# Patient Record
Sex: MALE | Race: WHITE | Employment: FULL TIME | URBAN - METROPOLITAN AREA
[De-identification: names, ages, dates, MRNs, and addresses within clinical notes are randomized per-mention and may not be internally consistent; named-entity substitution may affect disease eponyms.]

---

## 2017-01-12 ENCOUNTER — HOSPITAL ENCOUNTER (OUTPATIENT)
Dept: LAB | Age: 57
Discharge: HOME | End: 2017-01-12

## 2017-01-12 DIAGNOSIS — Z11.59 NEED FOR HEPATITIS C SCREENING TEST: ICD-10-CM

## 2017-01-12 DIAGNOSIS — E66.9 OBESITY (BMI 30-39.9): ICD-10-CM

## 2017-01-12 DIAGNOSIS — E03.4 HYPOTHYROIDISM DUE TO ACQUIRED ATROPHY OF THYROID: ICD-10-CM

## 2017-01-12 DIAGNOSIS — E78.5 DYSLIPIDEMIA: ICD-10-CM

## 2017-01-12 DIAGNOSIS — I63.9 CEREBROVASCULAR ACCIDENT (CVA), UNSPECIFIED MECHANISM (HCC): ICD-10-CM

## 2017-01-12 DIAGNOSIS — E55.9 HYPOVITAMINOSIS D: ICD-10-CM

## 2017-01-12 DIAGNOSIS — Z00.00 ROUTINE GENERAL MEDICAL EXAMINATION AT A HEALTH CARE FACILITY: ICD-10-CM

## 2017-01-12 LAB — SENTARA SPECIMEN COL,SENBCF: NORMAL

## 2017-01-12 PROCEDURE — 99001 SPECIMEN HANDLING PT-LAB: CPT | Performed by: INTERNAL MEDICINE

## 2017-01-16 ENCOUNTER — OFFICE VISIT (OUTPATIENT)
Dept: INTERNAL MEDICINE CLINIC | Age: 57
End: 2017-01-16

## 2017-01-16 VITALS
DIASTOLIC BLOOD PRESSURE: 82 MMHG | TEMPERATURE: 99.4 F | WEIGHT: 245 LBS | HEIGHT: 71 IN | BODY MASS INDEX: 34.3 KG/M2 | SYSTOLIC BLOOD PRESSURE: 122 MMHG | HEART RATE: 85 BPM | OXYGEN SATURATION: 97 %

## 2017-01-16 DIAGNOSIS — E55.9 HYPOVITAMINOSIS D: ICD-10-CM

## 2017-01-16 DIAGNOSIS — E66.9 OBESITY (BMI 30-39.9): ICD-10-CM

## 2017-01-16 DIAGNOSIS — E78.5 DYSLIPIDEMIA: Primary | ICD-10-CM

## 2017-01-16 DIAGNOSIS — E03.4 HYPOTHYROIDISM DUE TO ACQUIRED ATROPHY OF THYROID: ICD-10-CM

## 2017-01-16 DIAGNOSIS — I63.9 CEREBROVASCULAR ACCIDENT (CVA), UNSPECIFIED MECHANISM (HCC): ICD-10-CM

## 2017-01-16 DIAGNOSIS — R73.03 PREDIABETES: ICD-10-CM

## 2017-01-16 RX ORDER — ERGOCALCIFEROL 1.25 MG/1
50000 CAPSULE ORAL 2 TIMES WEEKLY
Qty: 26 CAP | Refills: 3 | Status: SHIPPED | OUTPATIENT
Start: 2017-01-16 | End: 2018-01-21 | Stop reason: SDUPTHER

## 2017-01-16 RX ORDER — ROSUVASTATIN CALCIUM 20 MG/1
20 TABLET, COATED ORAL
Qty: 30 TAB | Refills: 11 | Status: SHIPPED | OUTPATIENT
Start: 2017-01-16 | End: 2017-04-07 | Stop reason: SDUPTHER

## 2017-01-16 RX ORDER — DIAZEPAM 5 MG/1
TABLET ORAL
Qty: 40 TAB | Refills: 0 | OUTPATIENT
Start: 2017-01-16 | End: 2017-02-27 | Stop reason: SDUPTHER

## 2017-01-16 NOTE — MR AVS SNAPSHOT
Visit Information Date & Time Provider Department Dept. Phone Encounter #  
 1/16/2017  9:30 AM Duong Khoury MD Internist of [address] [place] [address] Place [id number] Your Appointments 6/12/2017  9:45 AM  
LAB with [facility] NURSE VISIT Internist of [facility] ([address]) Appt Note: labs for rpe rd  
 [address] [facility], [address] [place] [address] [place]  
  
   
 [address], [address]  
  
    
 6/19/2017  1:30 PM  
PHYSICAL with Duong Khoury MD  
Internist of [facility] [address] [place] [address]) Appt Note: rpe per rd  
 [address], [address] [address] [address] [place]  
  
   
 [address], [address] Upcoming Health Maintenance Date Due COLONOSCOPY 2/3/2022 DTaP/Tdap/Td series (2 - Td) 2/4/2024 Allergies as of 1/16/2017  Review Complete On: 12/26/2016 By: Duong Khoury MD  
  
 Severity Noted Reaction Type Reactions Amoxicillin (Bulk)    Unknown (comments) Rash around collar area Codeine  01/04/2010    Other (comments) Cymbalta [Duloxetine]  09/10/2015    Other (comments) Gi upset Erythromycin    Diarrhea Lipitor [Atorvastatin]  09/24/2012    Myalgia Pcn [Penicillins]  01/04/2010    Rash Pravastatin  01/17/2013    Myalgia Septra [Sulfamethoprim Ds]  07/18/2011    Unknown (comments) Simvastatin  09/10/2015    Myalgia Current Immunizations  Reviewed on 1/16/2017 Name Date H1N1 FLU VACCINE 2/19/2010 Influenza Vaccine 10/1/2016, 10/1/2015, 10/1/2014, 9/1/2013 Influenza Vaccine Nasal 9/1/2011 Influenza Vaccine Whole 1/1/2009 TD Vaccine 10/1/2009 Tdap 2/4/2014 Zoster 10/26/2011 Reviewed by Duong Khoury MD on 1/16/2017 at  9:48 AM  
Vitals BP Pulse Temp Height(growth percentile) Weight(growth percentile) SpO2  
 122/82 85 99.4 °F (37.4 °C) (Oral) 5' 10.5\" (1.791 m) 245 lb (111.1 kg) 97% BMI Smoking Status 34.66 kg/m2 Never Smoker Vitals History BMI and BSA Data Body Mass Index Body Surface Area  
 34.66 kg/m 2 2.35 m 2 Preferred Pharmacy Pharmacy Name Phone [facility] [address], [address] Your Updated Medication List  
  
   
This list is accurate as of: 1/16/17  9:52 AM.  Always use your most recent med list.  
  
  
  
  
 aspirin delayed-release 81 mg tablet Take 81 mg by mouth daily. diazePAM 5 mg tablet Commonly known as:  VALIUM  
TAKE ONE TABLET BY MOUTH EVERY 6 HOURS  
  
 ergocalciferol 50,000 unit capsule Commonly known as:  ERGOCALCIFEROL  
TAKE ONE CAPSULE BY MOUTH ONCE A WEEK ( EVERY 7 DAYS )  
  
 fluticasone 50 mcg/actuation nasal spray Commonly known as:  FLONASE  
USE TWO SPRAYS IN EACH NOSTRIL ONCE DAILY  
  
 furosemide 40 mg tablet Commonly known as:  LASIX TAKE ONE TABLET BY MOUTH ONCE DAILY  
  
 KLOR-CON M10 10 mEq tablet Generic drug:  potassium chloride TAKE ONE TABLET BY MOUTH TWICE DAILY  
  
 levothyroxine 100 mcg tablet Commonly known as:  SYNTHROID Take 1 Tab by mouth Daily (before breakfast). lovastatin 10 mg tablet Commonly known as:  MEVACOR  
TAKE ONE TABLET BY MOUTH ONCE NIGHTLY PriLOSEC 20 mg capsule Generic drug:  omeprazole Take 20 mg by mouth daily. Introducing [facility]! Dear Ashley Chatterjee: 
Thank you for requesting a [employer] account. Our records indicate that you already have an active Liquipel account. You can access your account anytime at [email or web address] Me Did you know that you can access your hospital and ER discharge instructions at any time in Liquipel? You can also review all of your test results from your hospital stay or ER visit. Additional Information If you have questions, please visit the Frequently Asked Questions section of the [employer] website at [email or web address] Remember, [employer] is NOT to be used for urgent needs. For medical emergencies, dial 911. Now available from your iPhone and Android! Please provide this summary of care documentation to your next provider. Your primary care clinician is listed as Missy Diamond. If you have any questions after today's visit, please call [phone].

## 2017-01-16 NOTE — PROGRESS NOTES
60 y.o. WHITE OR  male who presents for f/u    He's trying w the diet and exercise, weight as below    Vitals 1/16/2017 5/26/2016 12/17/2015 9/10/2015 5/19/2015 8/4/2014   Weight 245 lb 244 lb 247 lb 248 lb 245 lb 231 lb     Vitals 8/4/2014 2/4/2014   Weight  230 lb     He came off HRT 2015    The sleep apnea is controlled and he wears the mask religiously    He is doing ok on mevacor w no aches but not at target as below    No gi or gu issues.       He developed right sided hearing loss which is better after abx and steroid, currently being managed by Dr Kat Ross    Past Medical History   Diagnosis Date    Anxiety      LOPEZ-7 was 11/21 from 7/11    Arthritis 3/13     djd on mri c spine Dr. Leah Suarez Colon polyps 2008     Dr. Mark Christian CVA (cerebral vascular accident) [facility]) 2006     Dr. Guerita Castro s/p PFO closure, carotids negative    Depression      PHQ-9 was 7/27 from (7/11); 10/27 from (5/15)    Diverticula of colon     Dyslipidemia      intol lipitor, prava, zocor; tolerated mevacor    FHx: heart disease     GERD (gastroesophageal reflux disease)     Hemorrhoid     Hypogonadism male 2011     Dr Kayleigh Vela; dc'ed replacement tx     Hypothyroidism     Nephrolithiasis     Obesity (BMI 30-39.9)      peak weight 245 lbs, bmi 34.7 from 5/15; dec julia[ supp, med supervised wt loss, bariatric options    Peripheral neuropathy ([address] 2/13     on EMG negative serologies    PFO (patent foramen ovale) s/p closure Dr. Lindsay Valenzuela 2006     Prediabetes     Seasonal allergies     Sleep apnea 2006     Dr. Edgar Rodriguez White coat hypertension      Past Surgical History   Procedure Laterality Date    Hx orthopaedic       knee surgery    Hx hernia repair      Hx tonsillectomy      Pr cardiac surg procedure unlist  5/16     ETT negative    Hx colonoscopy       Dr. Vijay Rush 2008 polyp; Dr Lorenzo Montenegro 2/3/12 neg     Social History     Social History    Marital status:      Spouse name: N/A    Number of children: 2  Years of education: N/A     Occupational History    auto parts worker      Social History Main Topics    Smoking status: Never Smoker    Smokeless tobacco: Never Used    Alcohol use No    Drug use: No    Sexual activity: Yes     Partners: Female     Other Topics Concern    Not on file     Social History Narrative     Allergies   Allergen Reactions    Amoxicillin (Bulk) Unknown (comments)     Rash around collar area    Codeine Other (comments)    Cymbalta [Duloxetine] Other (comments)     Gi upset    Erythromycin Diarrhea    Lipitor [Atorvastatin] Myalgia    Pcn [Penicillins] Rash    Pravastatin Myalgia    Septra [Sulfamethoprim Ds] Unknown (comments)    Simvastatin Myalgia     Current Outpatient Prescriptions on File Prior to Visit   Medication Sig Dispense Refill    diazePAM (VALIUM) 5 mg tablet TAKE ONE TABLET BY MOUTH EVERY 6 HOURS 40 Tab 0    furosemide (LASIX) 40 mg tablet TAKE ONE TABLET BY MOUTH ONCE DAILY 90 Tab 3    levothyroxine (SYNTHROID) 100 mcg tablet Take 1 Tab by mouth Daily (before breakfast). 90 Tab 3    fluticasone (FLONASE) 50 mcg/actuation nasal spray USE TWO SPRAYS IN EACH NOSTRIL ONCE DAILY 1 Bottle 3    KLOR-CON M10 10 mEq tablet TAKE ONE TABLET BY MOUTH TWICE DAILY 60 Tab 012    omeprazole (PRILOSEC) 20 mg capsule Take 20 mg by mouth daily.  aspirin delayed-release 81 mg tablet Take 81 mg by mouth daily. No current facility-administered medications on file prior to visit.       REVIEW OF SYSTEMS: colo 2/12 Dr Sarah Tinsley  Ophtho - no vision change or eye pain  Oral - no mouth pain, tongue or tooth problems  Ears - no hearing loss, ear pain, fullness, no swallowing problems  Cardiac - no CP, PND, orthopnea, edema, palpitations or syncope  Chest - no breast masses  Resp - no wheezing, chronic coughing, dyspnea  GI - no heartburn, nausea, vomiting, change in bowel habits, bleeding, hemorrhoids  Urinary - no dysuria, hematuria, flank pain, urgency, frequency  Neuro - no focal weakness, numbness, paresthesias, incoordination, ataxia, involuntary movements  Endo - no polyuria, polydipsia, nocturia, hot flashes    Visit Vitals    /82    Pulse 85    Temp 99.4 °F (37.4 °C) (Oral)    Ht 5' 10.5\" (1.791 m)    Wt 245 lb (111.1 kg)    SpO2 97%    BMI 34.66 kg/m2   A&O x3  Affect is appropriate. Mood stable  No apparent distress  Anicteric, no JVD, adenopathy or thyromegaly. No carotid bruits or radiated murmur  Lungs clear to auscultation, no wheezes or rales  Heart showed regular rate and rhythm. No murmur, rubs, gallops  Abdomen soft nontender, no hepatosplenomegaly or masses. Extremities without edema.   Pulses 1-2+ symmetrically    LABS  From 2/10 showed   gluc 93,   cr 0.80,      alt 33,               chol 188, tg 74, hdl 45, ldl-c 128,             wbc 4.3, hb 14.3, plt 197,             psa 0.70, ua neg  From 10/11 showed gluc 102, cr 0.89, gfr 101, alt 25,           ldl-p 1499,             tsh 5.03, wbc 3.7, hb 13.4, plt 231  From 12/11 showed                     tsh 2.24,          ft4 1.02, tpo ab+  From 4/12 showed                     tsh 4.18  From 9/12 showed   gluc 106, cr 0.70, gfr>60,  alt 34,           ldl-p 1839, chol 191, tg 76, hdl 42, ldl-c 134  From 1/13 showed   gluc 100,       hba1c 6.0,      chol 174, tg 52, hdl 35, ldl-c 127,                        vit d 22.3  From 4/13 showed         hba1c 5.8,                         b12 499, fol 12.5, spep neg, guera neg  From 1/14 showed   gluc 97,   cr 0.90, gfr>60,  alt 26, hba1c 5.8,              chol 190, tg 85, hdl 44, ldl-c 129, tsh 3.72, wbc 4.9, hb 13.4, plt 262, vit d 29.2  From 5/15 showed   gluc 100, cr 0.91, gfr 94,   alt 24,       chol 177, tg 79, hdl 44, ldl-c 117, tsh 4.35, wbc 5.5, hb 13.1, plt 240  From 5/15 showed                     tsh 4.34,           b12 393, fol 11.3  From 9/15 showed            chol 183, tg 96, hdl 47, ldl-c 117, tsh 3.00,          vit d 29.0  From 5/16 showed   gluc 99,   cr 0.90, gfr>60, alt 21, hba1c 6.0,               tsh 4.73, wbc 4.4, hb 13.3, plt 222  From 1/17 showed   gluc 104, cr 0.70, gfr>60, alt 25,       chol 157, tg 71, hdl 46, ldl-c 97,             wbc 3.6, hb 13.2, plt 240, vit d 24.2, tsh 2.41, psa 1.24    Patient Active Problem List   Diagnosis Code    CVA Dr. Melia Vázquez 2006 I63.9    Sleep Apnea Dr. Dayton Mortimer 2006 G47.30    Anxiety and depression 2011 F41.9    Colon polyps and diverticulosis Dr. Stephanie Nevarez 2008 K63.5    White coat hypertension R03.0    Hypogonadism Dr. Arnol Tran 2011 E29.1    Dyslipidemia E78.5    Hypovitaminosis D E55.9    Neuropathy G62.9    Hypothyroidism due to acquired atrophy of thyroid E03.4    Gastroesophageal reflux disease without esophagitis K21.9    Obesity (BMI 30-39. 9) E66.9    Prediabetes R73.03     Assessment and plan:  1. HLP. Trial mevacor, crestor went generic recently so may change to that in future  2. Hypovit d. Inc to 2x/week dosing  3. Hypothyroidism. Therapeutic dosing  4. Hypogonadism. F/U Dr. Arnol Tran as needed, he is off replacement. 5. GERD. PPI and avoidance measures  6. Prediabetes. Lifestyle and dietary measures, wt loss would be ideal  7. Neuropathy. Declined meds for now  8. Polyps and diverticulosis. Fiber, f/u Dr Stephanie Nevarez 2018  9. Obesity. Declined julia supp, med supervised wt loss, bariatrics        RTC 6/17      Above conditions discussed at length and patient vocalized understanding.   All questions answered to patient satifaction

## 2017-01-16 NOTE — TELEPHONE ENCOUNTER
Last date seen:1/16/17  Last date filled:12/2/16     report was pulled on 60 y.o. [facility], No discrepancies were found.

## 2017-01-16 NOTE — PROGRESS NOTES
1. Have you been to the ER, urgent care clinic or hospitalized since your last visit? NO.     2. Have you seen or consulted any other health care providers outside of the [facility] since your last visit (Include any pap smears or colon screening)? NO      Do you have an Advanced Directive? NO    Would you like information on Advanced Directives?  YES

## 2017-02-27 RX ORDER — DIAZEPAM 5 MG/1
TABLET ORAL
Qty: 40 TAB | Refills: 0 | Status: SHIPPED | OUTPATIENT
Start: 2017-02-27 | End: 2017-04-07 | Stop reason: SDUPTHER

## 2017-02-28 ENCOUNTER — DOCUMENTATION ONLY (OUTPATIENT)
Dept: INTERNAL MEDICINE CLINIC | Age: 57
End: 2017-02-28

## 2017-02-28 RX ORDER — DIAZEPAM 5 MG/1
TABLET ORAL
Qty: 40 TAB | Refills: 0 | OUTPATIENT
Start: 2017-02-28

## 2017-04-04 RX ORDER — POTASSIUM CHLORIDE 750 MG/1
TABLET, FILM COATED, EXTENDED RELEASE ORAL
Qty: 60 TAB | Refills: 0 | Status: SHIPPED | OUTPATIENT
Start: 2017-04-04 | End: 2017-08-11 | Stop reason: SDUPTHER

## 2017-04-07 DIAGNOSIS — E78.5 DYSLIPIDEMIA: ICD-10-CM

## 2017-04-07 RX ORDER — ROSUVASTATIN CALCIUM 20 MG/1
20 TABLET, COATED ORAL
Qty: 30 TAB | Refills: 11 | Status: SHIPPED | OUTPATIENT
Start: 2017-04-07 | End: 2018-02-19 | Stop reason: SDUPTHER

## 2017-04-07 RX ORDER — DIAZEPAM 5 MG/1
TABLET ORAL
Qty: 40 TAB | Refills: 0 | Status: SHIPPED | OUTPATIENT
Start: 2017-04-07 | End: 2017-05-23 | Stop reason: SDUPTHER

## 2017-04-11 RX ORDER — ROSUVASTATIN CALCIUM 20 MG/1
20 TABLET, FILM COATED ORAL
Qty: 90 TAB | Refills: 0 | Status: SHIPPED | OUTPATIENT
Start: 2017-04-11 | End: 2017-07-05 | Stop reason: SDUPTHER

## 2017-05-10 ENCOUNTER — OFFICE VISIT (OUTPATIENT)
Dept: ORTHOPEDIC SURGERY | Age: 57
End: 2017-05-10

## 2017-05-10 VITALS
TEMPERATURE: 99 F | HEART RATE: 70 BPM | WEIGHT: 240 LBS | DIASTOLIC BLOOD PRESSURE: 73 MMHG | BODY MASS INDEX: 33.95 KG/M2 | SYSTOLIC BLOOD PRESSURE: 132 MMHG

## 2017-05-10 DIAGNOSIS — M17.11 PRIMARY OSTEOARTHRITIS OF RIGHT KNEE: ICD-10-CM

## 2017-05-10 DIAGNOSIS — M17.12 PRIMARY OSTEOARTHRITIS OF LEFT KNEE: Primary | ICD-10-CM

## 2017-05-10 RX ORDER — TRIAMCINOLONE ACETONIDE 40 MG/ML
40 INJECTION, SUSPENSION INTRA-ARTICULAR; INTRAMUSCULAR ONCE
Qty: 1 ML | Refills: 0
Start: 2017-05-10 | End: 2017-05-10

## 2017-05-10 RX ORDER — BISMUTH SUBSALICYLATE 262 MG
1 TABLET,CHEWABLE ORAL DAILY
COMMUNITY
End: 2018-03-29

## 2017-05-10 NOTE — PROGRESS NOTES
Karlene Ortiz  1960   Chief Complaint   Patient presents with    Knee Pain     Omar        HISTORY OF PRESENT ILLNESS  Karlene Ortiz is a 60 y.o. male who presents today for evaluation of B/L knee pain L>R. He rates his pain 5/10 today. He recalls about two months ago when he was playing with his grandson. He states a stick got stuck in between his legs when he was mid run and caused him to fall to the ground. He notes a throbbing sensation. He does note some swelling. He states he ambulates with a limp. He localizes the pain to the medial aspect of the knees.     Allergies   Allergen Reactions    Amoxicillin (Bulk) Unknown (comments)     Rash around collar area    Codeine Other (comments)    Cymbalta [Duloxetine] Other (comments)     Gi upset    Erythromycin Diarrhea    Lipitor [Atorvastatin] Myalgia    Pcn [Penicillins] Rash    Pravastatin Myalgia    Septra [Sulfamethoprim Ds] Unknown (comments)    Simvastatin Myalgia        Past Medical History:   Diagnosis Date    Anxiety     LOPEZ-7 was 11/21 from 7/11    Arthritis 3/13    djd on mri c spine Dr. Edith De Anda Colon polyps 2008    Dr. Nicole Zelaya [facility]    Dr. Baldwin Slight s/p PFO closure, carotids negative    Depression     PHQ-9 was 7/27 from (7/11); 10/27 from (5/15)    Diverticula of colon     Dyslipidemia     intol lipitor, prava, zocor; tolerated mevacor    FHx: heart disease     GERD (gastroesophageal reflux disease)     Hemorrhoid     Hypogonadism male 2011    Dr Chandu Tapia; dc'ed replacement tx     Hypothyroidism     Nephrolithiasis     Obesity (BMI 30-39.9)     peak weight 245 lbs, bmi 34.7 from 5/15; dec julia[ supp, med supervised wt loss, bariatric options    Peripheral neuropathy ([facility] [address] 2/13    on EMG negative serologies    PFO (patent foramen ovale) s/p closure Dr. Corey Quintero 2006     Prediabetes     Seasonal allergies     Sleep apnea 2006    Dr. Minor Whitley White coat hypertension       Social History     Social History    Marital status:      Spouse name: N/A    Number of children: 2    Years of education: N/A     Occupational History    auto parts worker      Social History Main Topics    Smoking status: Never Smoker    Smokeless tobacco: Never Used    Alcohol use No    Drug use: No    Sexual activity: Yes     Partners: Female     Other Topics Concern    Not on file     Social History Narrative      Past Surgical History:   Procedure Laterality Date    CARDIAC SURG PROCEDURE UNLIST  5/16    ETT negative    HX COLONOSCOPY      Dr. Zelaya Code 2008 polyp; Dr Billy Brown 2/3/12 neg    HX HERNIA REPAIR      HX ORTHOPAEDIC      knee surgery    HX TONSILLECTOMY        Family History   Problem Relation Age of Onset    Hypertension Mother     Cancer Mother      renal and kisney cancer   Donia [employer] Arthritis-rheumatoid Mother     Hypertension Father     Heart Disease Father       Current Outpatient Prescriptions   Medication Sig    multivitamin (ONE A DAY) tablet Take 1 Tab by mouth daily.  triamcinolone acetonide (KENALOG) 40 mg/mL injection 1 mL by IntraMUSCular route once for 1 dose.  CRESTOR 20 mg tablet Take 1 Tab by mouth nightly.  ergocalciferol (ERGOCALCIFEROL) 50,000 unit capsule Take 1 Cap by mouth two (2) times a week.  furosemide (LASIX) 40 mg tablet TAKE ONE TABLET BY MOUTH ONCE DAILY    levothyroxine (SYNTHROID) 100 mcg tablet Take 1 Tab by mouth Daily (before breakfast).  fluticasone (FLONASE) 50 mcg/actuation nasal spray USE TWO SPRAYS IN EACH NOSTRIL ONCE DAILY    KLOR-CON M10 10 mEq tablet TAKE ONE TABLET BY MOUTH TWICE DAILY    omeprazole (PRILOSEC) 20 mg capsule Take 20 mg by mouth daily.  aspirin delayed-release 81 mg tablet Take 81 mg by mouth daily.  diazePAM (VALIUM) 5 mg tablet TAKE ONE TABLET BY MOUTH EVERY 6 HOURS AS NEEDED    rosuvastatin (CRESTOR) 20 mg tablet Take 1 Tab by mouth nightly.     potassium chloride SR (KLOR-CON 10) 10 mEq tablet TAKE ONE TABLET BY MOUTH TWICE DAILY     No current facility-administered medications for this visit. REVIEW OF SYSTEM   Patient denies: Weight loss, Fever/Chills, HA, Visual changes, Fatigue, Chest pain, SOB, Abdominal pain, N/V/D/C, Blood in stool or urine, Edema. Pertinent positive as above in HPI. All others were negative    PHYSICAL EXAM:   Visit Vitals    /73 (BP 1 Location: Left arm, BP Patient Position: Sitting)    Pulse 70    Temp 99 °F (37.2 °C) (Oral)    Wt 240 lb (108.9 kg)    BMI 33.95 kg/m2     The patient is a well-developed, well-nourished male   in no acute distress. The patient is alert and oriented times three. The patient is alert and oriented times three. Mood and affect are normal.  LYMPHATIC: lymph nodes are not enlarged and are within normal limits  SKIN: normal in color and non tender to palpation. There are no bruises or abrasions noted. NEUROLOGICAL: Motor sensory exam is within normal limits. Reflexes are equal bilaterally. There is normal sensation to pinprick and light touch  MUSCULOSKELETAL:  Examination Left knee Right knee   Skin Intact Intact   Range of motion 0-130 0-130   Effusion - -   Medial joint line tenderness + +   Lateral joint line tenderness - -   Tenderness Pes Bursa - -   Tenderness insertion MCL - -   Tenderness insertion LCL - -   Julios - -   Patella crepitus - -   Patella grind - -   Lachman - -   Pivot shift - -   Anterior drawer - -   Posterior drawer - -   Varus stress - -   Valgus stress - -   Neurovascular Intact Intact   Calf Swelling and Tenderness to Palpation - -   Jaydon's Test - -   Hamstring Cord Tightness - -          PROCEDURE: After sterile prep, 4 cc of Xylocaine and 1 cc of Kenalog were injected into the left knee.        [facility]  OFFICE PROCEDURE PROGRESS NOTE        Chart reviewed for the following:  Ridge Diaz MD, have reviewed the History, Physical and updated the Allergic reactions for [employer]     TIME OUT performed immediately prior to start of procedure:  I, Murray Iyer MD, have performed the following reviews on Linkfluence prior to the start of the procedure:            * Patient was identified by name and date of birth   * Agreement on procedure being performed was verified  * Risks and Benefits explained to the patient  * Procedure site verified and marked as necessary  * Patient was positioned for comfort  * Consent was signed and verified     Time: 4:09 PM    Date of procedure: 5/10/2017    Procedure performed by:  Murray Iyer MD    Provider assisted by: (see medication administration)    How tolerated by patient: tolerated the procedure well with no complications    Comments: none      IMPRESSION:      ICD-10-CM ICD-9-CM    1. Primary osteoarthritis of left knee M17.12 715.16 TRIAMCINOLONE ACETONIDE INJ      triamcinolone acetonide (KENALOG) 40 mg/mL injection      DRAIN/INJECT LARGE JOINT/BURSA   2. Primary osteoarthritis of right knee M17.11 715.16         PLAN: Given the arthritic changes documented on the XR, I feel he would benefit most from a cortisone injection. Since he is currently experiencing more pain in the left knee, we will proceed with a cortisone injection in the left knee today. If he does not see lasting relief from the injection we may have to proceed with an MRI. I will see him back in 2 weeks for reevaluation. Follow-up Disposition: Not on File    Scribed by [facility]) as dictated by Murray Iyer MD    I, Dr. Murray Iyer, confirm that all documentation is accurate.     Murray Iyer M.D.   [facility]

## 2017-05-23 RX ORDER — DIAZEPAM 5 MG/1
TABLET ORAL
Qty: 40 TAB | Refills: 0 | OUTPATIENT
Start: 2017-05-23 | End: 2017-05-24 | Stop reason: SDUPTHER

## 2017-05-31 ENCOUNTER — OFFICE VISIT (OUTPATIENT)
Dept: ORTHOPEDIC SURGERY | Age: 57
End: 2017-05-31

## 2017-05-31 VITALS
HEIGHT: 71 IN | TEMPERATURE: 98 F | HEART RATE: 68 BPM | BODY MASS INDEX: 33.88 KG/M2 | DIASTOLIC BLOOD PRESSURE: 85 MMHG | WEIGHT: 242 LBS | SYSTOLIC BLOOD PRESSURE: 137 MMHG

## 2017-05-31 DIAGNOSIS — M17.11 PRIMARY OSTEOARTHRITIS OF RIGHT KNEE: ICD-10-CM

## 2017-05-31 DIAGNOSIS — M17.12 PRIMARY OSTEOARTHRITIS OF LEFT KNEE: Primary | ICD-10-CM

## 2017-05-31 NOTE — PROGRESS NOTES
Tae Alvarado  1960   Chief Complaint   Patient presents with    Knee Pain     Omar        HISTORY OF PRESENT ILLNESS  Tae Alvarado is a 60 y.o. male who presents today for reevaluation of B/L knee pain L>R. He rates his pain 2/10 today. At last office visit, patient's left knee was injected with cortisone. He reports still having pain in the left knee but feels as though the right knee pain has improved on its own. He recalls about two months ago when he was playing with his grandson. He states a stick got stuck in between his legs when he was mid run and caused him to fall to the ground. He notes a throbbing sensation. He does note some swelling. He states he ambulates with a limp. He localizes the pain to the medial aspect of the knees. Patient denies any fever, chills, chest pain, shortness of breath or calf pain. There are no new illness or injuries to report since last seen in the office. There are no changes to medications, allergies, family or social history. PHYSICAL EXAM:   Visit Vitals    /85    Pulse 68    Temp 98 °F (36.7 °C) (Oral)    Ht 5' 10.5\" (1.791 m)    Wt 242 lb (109.8 kg)    BMI 34.23 kg/m2     The patient is a well-developed, well-nourished male   in no acute distress. The patient is alert and oriented times three. The patient is alert and oriented times three. Mood and affect are normal.  LYMPHATIC: lymph nodes are not enlarged and are within normal limits  SKIN: normal in color and non tender to palpation. There are no bruises or abrasions noted. NEUROLOGICAL: Motor sensory exam is within normal limits. Reflexes are equal bilaterally.  There is normal sensation to pinprick and light touch  MUSCULOSKELETAL:  Examination Left knee Right knee   Skin Intact Intact   Range of motion 0-130 0-130   Effusion - -   Medial joint line tenderness + +   Lateral joint line tenderness - -   Tenderness Pes Bursa - -   Tenderness insertion MCL - -   Tenderness insertion LCL - - Julios - -   Patella crepitus - -   Patella grind - -   Lachman - -   Pivot shift - -   Anterior drawer - -   Posterior drawer - -   Varus stress - -   Valgus stress - -   Neurovascular Intact Intact   Calf Swelling and Tenderness to Palpation - -   Jaydon's Test - -   Hamstring Cord Tightness - -       IMPRESSION:      ICD-10-CM ICD-9-CM    1. Primary osteoarthritis of left knee M17.12 715.16    2. Primary osteoarthritis of right knee M17.11 715.16         PLAN: Patient received limited relief from the cortisone injection he received in his left knee at his last office visit. At this time, he does not feel the pain is bad enough to proceed with an MRI. He will continue his activities as tolerated. I will see him back as needed. Follow-up Disposition: Not on File    Scribed by Parker Sotelo [facility]) as dictated by Christine Burnette MD    I, Dr. Christine Burnette, confirm that all documentation is accurate.     Christine Burnette M.D.   [facility]

## 2017-06-12 LAB
AVG GLU, 10930: 126 MG/DL (ref 91–123)
HBA1C MFR BLD HPLC: 6 % (ref 4.8–5.9)

## 2017-06-13 LAB
25(OH)D3 SERPL-MCNC: 37 NG/ML (ref 32–100)
ALT SERPL-CCNC: 20 U/L (ref 5–40)
ANION GAP SERPL CALC-SCNC: 14 MMOL/L
BUN SERPL-MCNC: 16 MG/DL (ref 6–22)
CALCIUM SERPL-MCNC: 8.8 MG/DL (ref 8.4–10.4)
CHLORIDE SERPL-SCNC: 103 MMOL/L (ref 98–110)
CHOLEST SERPL-MCNC: 105 MG/DL (ref 110–200)
CO2 SERPL-SCNC: 26 MMOL/L (ref 20–32)
CREAT SERPL-MCNC: 0.8 MG/DL (ref 0.5–1.2)
GFRAA, 66117: >60
GFRNA, 66118: >60
GLUCOSE SERPL-MCNC: 104 MG/DL (ref 65–99)
HDLC SERPL-MCNC: 59 MG/DL (ref 40–59)
LDLC SERPL CALC-MCNC: 40 MG/DL (ref 50–99)
POTASSIUM SERPL-SCNC: 4.3 MMOL/L (ref 3.5–5.5)
SODIUM SERPL-SCNC: 143 MMOL/L (ref 133–145)
TRIGL SERPL-MCNC: 34 MG/DL (ref 40–149)
VLDLC SERPL CALC-MCNC: 7 MG/DL (ref 8–30)

## 2017-06-15 NOTE — PROGRESS NOTES
60 y.o. WHITE OR  male who presents for RPE    He's trying w the diet, exercise has been off due to his knees    Vitals 6/19/2017 5/31/2017 5/10/2017 1/16/2017 5/26/2016   Weight 248 lb 12.8 oz 242 lb 240 lb 245 lb 244 lb     The sleep apnea is controlled and he wears the mask religiously    He changed over to crestor and reports no problems    No gi or gu issues.       Dr Gigi Vasquez gave him cortisone shot and his knees are better    Past Medical History:   Diagnosis Date    Anxiety     LOPEZ-7 was 11/21 from 7/11    Arthritis 3/13    djd on mri c spine Dr. Reza Loera Colon polyps 2008    Dr. Destini Norwood CVA (cerebral vascular accident) [facility]) 2006    Dr. Holly Tapia s/p PFO closure, carotids negative    Depression     PHQ-9 was 7/27 from (7/11); 10/27 from (5/15)    Diverticula of colon     Dyslipidemia     intol lipitor, prava, zocor; tolerated mevacor    FHx: heart disease     GERD (gastroesophageal reflux disease)     Hearing loss 2016    right sided; s/p myringotomy Dr Elder Ramsey; now seeing diff ENT    Hemorrhoid     Hypogonadism male 2011    Dr Chris Howard; dc'ed replacement tx 2015    Hypothyroidism     Nephrolithiasis     Obesity (BMI 30-39.9)     peak weight 245 lbs, bmi 34.7 from 5/15; dec julia[ supp, med supervised wt loss, bariatric options    Peripheral neuropathy ([address] 2/13    on EMG negative serologies    PFO (patent foramen ovale) s/p closure Dr. Hazel Cortez 2006     Prediabetes     Seasonal allergies     Sleep apnea 2006    Dr. Danyel Araujo White coat hypertension      Past Surgical History:   Procedure Laterality Date    CARDIAC SURG PROCEDURE UNLIST  5/16    ETT negative    HX COLONOSCOPY      Dr. Pawel Sigala 2008 polyp; Dr Vickie Matt 2/3/12 neg    HX HERNIA REPAIR      HX ORTHOPAEDIC      knee surgery    HX TONSILLECTOMY       Social History     Social History    Marital status:      Spouse name: N/A    Number of children: 2    Years of education: N/A     Occupational History    auto parts worker      Social History Main Topics    Smoking status: Never Smoker    Smokeless tobacco: Never Used    Alcohol use No    Drug use: No    Sexual activity: Yes     Partners: Female     Other Topics Concern    Not on file     Social History Narrative     Allergies   Allergen Reactions    Amoxicillin (Bulk) Unknown (comments)     Rash around collar area    Codeine Other (comments)    Cymbalta [Duloxetine] Other (comments)     Gi upset    Erythromycin Diarrhea    Lipitor [Atorvastatin] Myalgia    Pcn [Penicillins] Rash    Pravastatin Myalgia    Septra [Sulfamethoprim Ds] Unknown (comments)    Simvastatin Myalgia     Current Outpatient Prescriptions on File Prior to Visit   Medication Sig Dispense Refill    diazePAM (VALIUM) 5 mg tablet TAKE ONE TABLET BY MOUTH EVERY 6 HOURS AS NEEDED 40 Tab 0    multivitamin (ONE A DAY) tablet Take 1 Tab by mouth daily.  CRESTOR 20 mg tablet Take 1 Tab by mouth nightly. 90 Tab 0    rosuvastatin (CRESTOR) 20 mg tablet Take 1 Tab by mouth nightly. 30 Tab 11    potassium chloride SR (KLOR-CON 10) 10 mEq tablet TAKE ONE TABLET BY MOUTH TWICE DAILY 60 Tab 0    ergocalciferol (ERGOCALCIFEROL) 50,000 unit capsule Take 1 Cap by mouth two (2) times a week. 26 Cap 3    furosemide (LASIX) 40 mg tablet TAKE ONE TABLET BY MOUTH ONCE DAILY 90 Tab 3    levothyroxine (SYNTHROID) 100 mcg tablet Take 1 Tab by mouth Daily (before breakfast). 90 Tab 3    fluticasone (FLONASE) 50 mcg/actuation nasal spray USE TWO SPRAYS IN EACH NOSTRIL ONCE DAILY 1 Bottle 3    KLOR-CON M10 10 mEq tablet TAKE ONE TABLET BY MOUTH TWICE DAILY 60 Tab 012    omeprazole (PRILOSEC) 20 mg capsule Take 20 mg by mouth daily.  aspirin delayed-release 81 mg tablet Take 81 mg by mouth daily. No current facility-administered medications on file prior to visit.       REVIEW OF SYSTEMS: colo 2/12 Dr Paulino Estrada  no vision change or eye pain  Oral  no mouth pain, tongue or tooth problems  Ears  no hearing loss, ear pain, fullness, no swallowing problems  Cardiac  no CP, PND, orthopnea, edema, palpitations or syncope  Chest  no breast masses  Resp  no wheezing, chronic coughing, dyspnea  GI  no heartburn, nausea, vomiting, change in bowel habits, bleeding, hemorrhoids  Urinary  no dysuria, hematuria, flank pain, urgency, frequency  Neuro  no focal weakness, numbness, paresthesias, incoordination, ataxia, involuntary movements  Endo - no polyuria, polydipsia, nocturia, hot flashes    Visit Vitals    /70 (BP 1 Location: Right arm, BP Patient Position: Sitting)    Pulse 74    Temp 98.5 °F (36.9 °C) (Oral)    Resp 14    Ht 5' 10\" (1.778 m)    Wt 248 lb 12.8 oz (112.9 kg)    SpO2 97%    BMI 35.7 kg/m2   A&O x3  Affect is appropriate. Mood stable  No apparent distress  Anicteric, no JVD, adenopathy or thyromegaly. No carotid bruits or radiated murmur  Lungs clear to auscultation, no wheezes or rales  Heart showed regular rate and rhythm. No  rubs, gallops. 2/6 jodie lsb without rad  Abdomen soft nontender, no hepatosplenomegaly or masses. Extremities without edema.   Pulses 1-2+ symmetrically    LABS  From 2/10 showed   gluc 93,   cr 0.80,      alt 33,               chol 188, tg 74, hdl 45, ldl-c 128,             wbc 4.3, hb 14.3, plt 197,             psa 0.70, ua neg  From 10/11 showed gluc 102, cr 0.89, gfr 101, alt 25,           ldl-p 1499,             tsh 5.03, wbc 3.7, hb 13.4, plt 231  From 12/11 showed                     tsh 2.24,          ft4 1.02, tpo ab+  From 4/12 showed                     tsh 4.18  From 9/12 showed   gluc 106, cr 0.70, gfr>60,  alt 34,           ldl-p 1839, chol 191, tg 76, hdl 42, ldl-c 134  From 1/13 showed   gluc 100,       hba1c 6.0,      chol 174, tg 52, hdl 35, ldl-c 127,                        vit d 22.3  From 4/13 showed         hba1c 5.8,                         b12 499, fol 12.5, spep neg, guera neg  From 1/14 showed   gluc 97,   cr 0.90, gfr>60,  alt 26, hba1c 5.8,              chol 190, tg 85, hdl 44, ldl-c 129, tsh 3.72, wbc 4.9, hb 13.4, plt 262, vit d 29.2  From 5/15 showed   gluc 100, cr 0.91, gfr 94,   alt 24,       chol 177, tg 79, hdl 44, ldl-c 117, tsh 4.35, wbc 5.5, hb 13.1, plt 240  From 5/15 showed                     tsh 4.34,           b12 393, fol 11.3  From 9/15 showed            chol 183, tg 96, hdl 47, ldl-c 117, tsh 3.00,          vit d 29.0  From 5/16 showed   gluc 99,   cr 0.90, gfr>60, alt 21, hba1c 6.0,               tsh 4.73, wbc 4.4, hb 13.3, plt 222  From 1/17 showed   gluc 104, cr 0.70, gfr>60, alt 25,       chol 157, tg 71, hdl 46, ldl-c 97,             wbc 3.6, hb 13.2, plt 240, vit d 24.2, tsh 2.41, psa 1.24    Results for orders placed or performed in visit on 82/48/37   METABOLIC PANEL, BASIC   Result Value Ref Range    Glucose 104 (H) 65 - 99 mg/dL    BUN 16 6 - 22 mg/dL    Creatinine 0.8 0.5 - 1.2 mg/dL    Sodium 143 133 - 145 mmol/L    Potassium 4.3 3.5 - 5.5 mmol/L    Chloride 103 98 - 110 mmol/L    CO2 26 20 - 32 mmol/L    Calcium 8.8 8.4 - 10.4 mg/dL    Anion gap 14.0 mmol/L    GFRAA >60.0 >60.0    GFRNA >60.0 >60.0   LIPID PANEL   Result Value Ref Range    Triglyceride 34 (L) 40 - 149 mg/dL    HDL Cholesterol 59 40 - 59 mg/dL    Cholesterol, total 105 (L) 110 - 200 mg/dL    LDL, calculated 40 (L) 50 - 99 mg/dL    VLDL, calculated 7 (L) 8 - 30 mg/dL   VITAMIN D, 25 HYDROXY   Result Value Ref Range    VITAMIN D, 25-HYDROXY 37.0 32.0 - 100.0 ng/mL   HEMOGLOBIN A1C W/O EAG   Result Value Ref Range    Hemoglobin A1c 6.0 (H) 4.8 - 5.9 %    AVG  (H) 91 - 123 mg/dL   ALT   Result Value Ref Range    ALT (SGPT) 20 5 - 40 U/L     Patient Active Problem List   Diagnosis Code    CVA Dr. Arcenio Martinez 2006 I63.9    Sleep Apnea Dr. Indira Martinez 2006 G47.30    Anxiety and depression 2011 F41.9    Colon polyps and diverticulosis Dr. Jaylyn Gordon 2008 K63.5    White coat hypertension [id number]    Hypogonadism Dr. Cait Chavarria 2011 E29.1    Dyslipidemia E78.5    Hypovitaminosis D E55.9    Neuropathy G62.9    Hypothyroidism due to acquired atrophy of thyroid E03.4    Gastroesophageal reflux disease without esophagitis K21.9    Obesity (BMI 30-39. 9) E66.9    Prediabetes R73.03     Assessment and plan:  1. HLP. Continue current regimen and doing well  2. Hypovit d. Check labs next draw  3. Hypothyroidism. Therapeutic dosing at last check  4. Hypogonadism. F/U Dr. Adams Keepers as needed, he is off replacement. 5. GERD. PPI and avoidance measures  6. Prediabetes. Lifestyle and dietary measures, wt loss would be ideal  7. Neuropathy. Declined meds   8. Polyps and diverticulosis. Fiber, f/u Dr Jackie Nieves 2018  9. Obesity. Declined julia supp, med supervised wt loss, bariatrics        RTC 6/18      Above conditions discussed at length and patient vocalized understanding.   All questions answered to patient satifaction

## 2017-06-19 ENCOUNTER — OFFICE VISIT (OUTPATIENT)
Dept: INTERNAL MEDICINE CLINIC | Age: 57
End: 2017-06-19

## 2017-06-19 VITALS
BODY MASS INDEX: 35.62 KG/M2 | SYSTOLIC BLOOD PRESSURE: 118 MMHG | WEIGHT: 248.8 LBS | TEMPERATURE: 98.5 F | HEIGHT: 70 IN | OXYGEN SATURATION: 97 % | HEART RATE: 74 BPM | RESPIRATION RATE: 14 BRPM | DIASTOLIC BLOOD PRESSURE: 70 MMHG

## 2017-06-19 DIAGNOSIS — M15.9 PRIMARY OSTEOARTHRITIS INVOLVING MULTIPLE JOINTS: ICD-10-CM

## 2017-06-19 DIAGNOSIS — E78.5 DYSLIPIDEMIA: ICD-10-CM

## 2017-06-19 DIAGNOSIS — E55.9 HYPOVITAMINOSIS D: ICD-10-CM

## 2017-06-19 DIAGNOSIS — E66.9 OBESITY (BMI 30-39.9): ICD-10-CM

## 2017-06-19 DIAGNOSIS — R73.03 PREDIABETES: ICD-10-CM

## 2017-06-19 DIAGNOSIS — G47.33 OBSTRUCTIVE SLEEP APNEA SYNDROME: ICD-10-CM

## 2017-06-19 DIAGNOSIS — I63.9 CEREBROVASCULAR ACCIDENT (CVA), UNSPECIFIED MECHANISM (HCC): ICD-10-CM

## 2017-06-19 DIAGNOSIS — K21.9 GASTROESOPHAGEAL REFLUX DISEASE WITHOUT ESOPHAGITIS: ICD-10-CM

## 2017-06-19 DIAGNOSIS — E03.4 HYPOTHYROIDISM DUE TO ACQUIRED ATROPHY OF THYROID: ICD-10-CM

## 2017-06-19 DIAGNOSIS — Z00.00 PHYSICAL EXAM: Primary | ICD-10-CM

## 2017-06-19 NOTE — MR AVS SNAPSHOT
Visit Information Date & Time Provider Department Dept. Phone Encounter #  
 6/19/2017  1:30 PM Tono Lawson MD Internist of [address] [place] [address] Place [id number] Your Appointments 6/19/2018  8:05 AM  
LAB with [facility] NURSE VISIT Internist of [facility] Appt Note: lab  
 [address], [address] [id number] [address] [place]  
  
   
 [address] [facility]  
  
    
 6/26/2018  8:20 AM  
PHYSICAL with Tono Lawson MD  
Internist of [facility]) Appt Note: [address]  
 [address] [facility] [place] [facility], [address] [id number] [address] [place]  
  
   
 [address], [place] Upcoming Health Maintenance Date Due INFLUENZA AGE 9 TO ADULT 8/1/2017 COLONOSCOPY 2/3/2022 DTaP/Tdap/Td series (2 - Td) 2/4/2024 Allergies as of 6/19/2017  Review Complete On: 6/19/2017 By: Paige Vu Severity Noted Reaction Type Reactions Amoxicillin (Bulk)    Unknown (comments) Rash around collar area Codeine  01/04/2010    Other (comments) Cymbalta [Duloxetine]  09/10/2015    Other (comments) Gi upset Erythromycin    Diarrhea Lipitor [Atorvastatin]  09/24/2012    Myalgia Pcn [Penicillins]  01/04/2010    Rash Pravastatin  01/17/2013    Myalgia Septra [Sulfamethoprim Ds]  07/18/2011    Unknown (comments) Simvastatin  09/10/2015    Myalgia Current Immunizations  Reviewed on 1/16/2017 Name Date H1N1 FLU VACCINE 2/19/2010 Influenza Vaccine 10/1/2016, 10/1/2015, 10/1/2014, 9/1/2013 Influenza Vaccine Nasal 9/1/2011 Influenza Vaccine Whole 1/1/2009 TD Vaccine 10/1/2009 Tdap 2/4/2014 Zoster 10/26/2011 Not reviewed this visit Vitals BP Pulse Temp Resp Height(growth percentile) Weight(growth percentile)  118/70 (BP 1 Location: Right arm, BP Patient Position: Sitting) 74 98.5 °F (36.9 °C) (Oral) 14 5' 10\" (1.778 m) 248 lb 12.8 oz (112.9 kg) SpO2 BMI Smoking Status 97% 35.7 kg/m2 Never Smoker Vitals History BMI and BSA Data Body Mass Index Body Surface Area 35.7 kg/m 2 2.36 m 2 Preferred Pharmacy Pharmacy Name Phone [facility] [address] [place], [address] Your Updated Medication List  
  
   
This list is accurate as of: 6/19/17  2:11 PM.  Always use your most recent med list.  
  
  
  
  
 aspirin delayed-release 81 mg tablet Take 81 mg by mouth daily. diazePAM 5 mg tablet Commonly known as:  VALIUM  
TAKE ONE TABLET BY MOUTH EVERY 6 HOURS AS NEEDED  
  
 ergocalciferol 50,000 unit capsule Commonly known as:  ERGOCALCIFEROL Take 1 Cap by mouth two (2) times a week. fluticasone 50 mcg/actuation nasal spray Commonly known as:  FLONASE  
USE TWO SPRAYS IN EACH NOSTRIL ONCE DAILY  
  
 furosemide 40 mg tablet Commonly known as:  LASIX TAKE ONE TABLET BY MOUTH ONCE DAILY  
  
 * KLOR-CON M10 10 mEq tablet Generic drug:  potassium chloride TAKE ONE TABLET BY MOUTH TWICE DAILY * potassium chloride SR 10 mEq tablet Commonly known as:  KLOR-CON 10  
TAKE ONE TABLET BY MOUTH TWICE DAILY  
  
 levothyroxine 100 mcg tablet Commonly known as:  SYNTHROID Take 1 Tab by mouth Daily (before breakfast). multivitamin tablet Commonly known as:  ONE A DAY Take 1 Tab by mouth daily. PriLOSEC 20 mg capsule Generic drug:  omeprazole Take 20 mg by mouth daily. * rosuvastatin 20 mg tablet Commonly known as:  CRESTOR Take 1 Tab by mouth nightly. * CRESTOR 20 mg tablet Generic drug:  rosuvastatin Take 1 Tab by mouth nightly. * Notice: This list has 4 medication(s) that are the same as other medications prescribed for you. Read the directions carefully, and ask your doctor or other care provider to review them with you. Introducing [facility]! Dear Jamison Pacheco: 
Thank you for requesting a [employer] account. Our records indicate that you already have an active [employer] account. You can access your account anytime at [email or web address] Did you know that you can access your hospital and ER discharge instructions at any time in [employer]? You can also review all of your test results from your hospital stay or ER visit. Additional Information If you have questions, please visit the Frequently Asked Questions section of the [employer] website at [email or web address] Remember, [employer] is NOT to be used for urgent needs. For medical emergencies, dial 911. Now available from your iPhone and Android! Please provide this summary of care documentation to your next provider. Your primary care clinician is listed as Arpan Anderson. If you have any questions after today's visit, please call [phone].

## 2017-06-19 NOTE — PROGRESS NOTES
1. Have you been to the ER, urgent care clinic or hospitalized since your last visit? NO.     2. Have you seen or consulted any other health care providers outside of the [address] [place] [address] since your last visit (Include any pap smears or colon screening)? NO      Do you have an Advanced Directive? NO    Would you like information on Advanced Directives?  NO

## 2017-07-03 RX ORDER — DIAZEPAM 5 MG/1
TABLET ORAL
Qty: 40 TAB | Refills: 0 | Status: SHIPPED | OUTPATIENT
Start: 2017-07-03 | End: 2017-08-08 | Stop reason: SDUPTHER

## 2017-07-05 ENCOUNTER — TELEPHONE (OUTPATIENT)
Dept: INTERNAL MEDICINE CLINIC | Age: 57
End: 2017-07-05

## 2017-07-06 RX ORDER — ROSUVASTATIN CALCIUM 20 MG/1
TABLET, FILM COATED ORAL
Qty: 90 TAB | Refills: 0 | Status: SHIPPED | OUTPATIENT
Start: 2017-07-06 | End: 2017-11-05 | Stop reason: SDUPTHER

## 2017-07-16 DIAGNOSIS — E78.5 DYSLIPIDEMIA: ICD-10-CM

## 2017-07-18 DIAGNOSIS — E55.9 HYPOVITAMINOSIS D: ICD-10-CM

## 2017-07-18 DIAGNOSIS — R73.03 PREDIABETES: ICD-10-CM

## 2017-08-08 RX ORDER — DIAZEPAM 5 MG/1
TABLET ORAL
Qty: 40 TAB | Refills: 0 | Status: SHIPPED | OUTPATIENT
Start: 2017-08-08 | End: 2017-09-18 | Stop reason: SDUPTHER

## 2017-08-10 ENCOUNTER — TELEPHONE (OUTPATIENT)
Dept: INTERNAL MEDICINE CLINIC | Age: 57
End: 2017-08-10

## 2017-08-11 RX ORDER — POTASSIUM CHLORIDE 750 MG/1
TABLET, EXTENDED RELEASE ORAL
Qty: 60 TAB | Refills: 0 | Status: SHIPPED | OUTPATIENT
Start: 2017-08-11 | End: 2017-12-18 | Stop reason: SDUPTHER

## 2017-08-17 ENCOUNTER — TELEPHONE (OUTPATIENT)
Dept: ORTHOPEDIC SURGERY | Age: 57
End: 2017-08-17

## 2017-08-17 DIAGNOSIS — M25.561 ACUTE PAIN OF RIGHT KNEE: Primary | ICD-10-CM

## 2017-08-17 DIAGNOSIS — S83.241A OTHER TEAR OF MEDIAL MENISCUS OF RIGHT KNEE AS CURRENT INJURY, INITIAL ENCOUNTER: ICD-10-CM

## 2017-08-17 NOTE — TELEPHONE ENCOUNTER
Spoke with patient and he said his left knee is actually fine. His right knee is the one that has been hurting really bad that he cant even sleep. No new injury since we last saw him either. He would the MRI done on just the right knee for now.  Please advise

## 2017-08-17 NOTE — TELEPHONE ENCOUNTER
Last visit we discussed MRI of left knee r/o MMT. Is this still the knee bothering him?  If so ok for MRI left knee

## 2017-08-17 NOTE — TELEPHONE ENCOUNTER
Patient called stating he would like to schedule an MRI for his knees. He is requesting a call back. Please advise patient at [phone].

## 2017-08-18 ENCOUNTER — TELEPHONE (OUTPATIENT)
Dept: ORTHOPEDIC SURGERY | Age: 57
End: 2017-08-18

## 2017-08-18 DIAGNOSIS — M25.562 LEFT KNEE PAIN, UNSPECIFIED CHRONICITY: Primary | ICD-10-CM

## 2017-08-18 NOTE — TELEPHONE ENCOUNTER
Patient called back to advise that he has changed his mind and is now wanting an order for both knees. He previously requested for rt knee only.

## 2017-08-18 NOTE — TELEPHONE ENCOUNTER
PER PHONE MESSAGE ON 8-17-17, [address] [employer] AUTHORIZED THE ORDER OF MRI LEFT KNEE. THIS ORDER WAS ENTERED. A  WILL CALL THE PATIENT.

## 2017-08-21 DIAGNOSIS — E03.4 HYPOTHYROIDISM DUE TO ACQUIRED ATROPHY OF THYROID: ICD-10-CM

## 2017-08-21 RX ORDER — LEVOTHYROXINE SODIUM 100 UG/1
TABLET ORAL
Qty: 90 TAB | Refills: 3 | Status: SHIPPED | OUTPATIENT
Start: 2017-08-21 | End: 2018-09-01 | Stop reason: SDUPTHER

## 2017-08-22 ENCOUNTER — OFFICE VISIT (OUTPATIENT)
Dept: ORTHOPEDIC SURGERY | Age: 57
End: 2017-08-22

## 2017-08-22 VITALS
WEIGHT: 242 LBS | TEMPERATURE: 98.8 F | HEART RATE: 77 BPM | RESPIRATION RATE: 20 BRPM | DIASTOLIC BLOOD PRESSURE: 86 MMHG | HEIGHT: 72 IN | SYSTOLIC BLOOD PRESSURE: 138 MMHG | OXYGEN SATURATION: 98 % | BODY MASS INDEX: 32.78 KG/M2

## 2017-08-22 DIAGNOSIS — M17.12 PRIMARY OSTEOARTHRITIS OF LEFT KNEE: ICD-10-CM

## 2017-08-22 DIAGNOSIS — S83.241D OTHER TEAR OF MEDIAL MENISCUS OF RIGHT KNEE AS CURRENT INJURY, SUBSEQUENT ENCOUNTER: ICD-10-CM

## 2017-08-22 DIAGNOSIS — M17.11 PRIMARY OSTEOARTHRITIS OF RIGHT KNEE: Primary | ICD-10-CM

## 2017-08-22 RX ORDER — TRIAMCINOLONE ACETONIDE 40 MG/ML
80 INJECTION, SUSPENSION INTRA-ARTICULAR; INTRAMUSCULAR ONCE
Qty: 2 ML | Refills: 0
Start: 2017-08-22 | End: 2017-08-22

## 2017-08-22 RX ORDER — LIDOCAINE HYDROCHLORIDE 10 MG/ML
6 INJECTION INFILTRATION; PERINEURAL ONCE
Qty: 6 ML | Refills: 0
Start: 2017-08-22 | End: 2017-08-22

## 2017-08-22 NOTE — PROGRESS NOTES
Vani Saldana  1960   Chief Complaint   Patient presents with    Knee Pain     Bilateral        HISTORY OF PRESENT ILLNESS  Vani Saldana is a 59 y.o. male who presents today for reevaluation of bilateral knee pain. R>L He states 3 weeks ago his pain has become quite severe in his knees. Worse with activity better with rest. He is having a hard time walking secondary to the pain. Describes a sharp catching sensation in his right knee and swelling. Pain is a 6/10. Patient denies any fever, chills, chest pain, shortness of breath or calf pain. There are no new illness or injuries to report since last seen in the office. No changes in medications, allergies, social or family history. PHYSICAL EXAM:   Visit Vitals    /86    Pulse 77    Temp 98.8 °F (37.1 °C) (Oral)    Resp 20    Ht 6' (1.829 m)    Wt 242 lb (109.8 kg)    SpO2 98%    BMI 32.82 kg/m2     The patient is a well-developed, well-nourished male   in no acute distress. The patient is alert and oriented times three. The patient is alert and oriented times three. Mood and affect are normal.  LYMPHATIC: lymph nodes are not enlarged and are within normal limits  SKIN: normal in color and non tender to palpation. There are no bruises or abrasions noted. NEUROLOGICAL: Motor sensory exam is within normal limits. Reflexes are equal bilaterally.  There is normal sensation to pinprick and light touch  MUSCULOSKELETAL:  Examination Left knee Right knee   Skin Intact Intact   Range of motion 0-130 0-130   Effusion - +   Medial joint line tenderness + +   Lateral joint line tenderness - -   Tenderness Pes Bursa - -   Tenderness insertion MCL - -   Tenderness insertion LCL - -   Julios - -   Patella crepitus - -   Patella grind - -   Lachman - -   Pivot shift - -   Anterior drawer - -   Posterior drawer - -   Varus stress - -   Valgus stress - -   Neurovascular Intact Intact   Calf Swelling and Tenderness to Palpation - -   Jaydon's Test - -   Hamstring Cord Tightness - -          PROCEDURE: After sterile prep, 4 cc of Xylocaine and 1 cc of Kenalog were injected into the right knee. [facility]  OFFICE PROCEDURE PROGRESS NOTE        Chart reviewed for the following:  Truong RODRIGUEZ PA, have reviewed the History, Physical and updated the Allergic reactions for [employer]     TIME OUT performed immediately prior to start of procedure:  Truong RODRIGUEZ PA-C, have performed the following reviews on [employer] prior to the start of the procedure:            * Patient was identified by name and date of birth   * Agreement on procedure being performed was verified  * Risks and Benefits explained to the patient  * Procedure site verified and marked as necessary  * Patient was positioned for comfort  * Consent was signed and verified     Time: 4:16 PM    Date of procedure: 8/22/2017    Procedure performed by:  PENELOPE Fam    Provider assisted by: (see medication administration)    How tolerated by patient: tolerated the procedure well with no complications    Comments: none       IMPRESSION:      ICD-10-CM ICD-9-CM    1. Primary osteoarthritis of right knee M17.11 715.16    2. Other tear of medial meniscus of right knee as current injury, subsequent encounter S83.241D 836.0    3. Primary osteoarthritis of left knee M17.12 715.16         PLAN:   1. Degenerative arthritis acute exacerbation right knee with possible meniscus tear  Risk factors include: BMI>30  2. Yes cortisone injection indicated today right knee  3. No Physical/Occupational Therapy indicated today  4. Yes diagnostic test indicated today: MRI right knee r/o MMT  5. No durable medical equipment indicated today  6. No referral indicated today   7. No medications indicated today:   8.  No Narcotic indicated today   RTC after MRI with Dr. Basilio Lackey    Follow-up Disposition: Not on [address] [place] [address], [address] [place] [address] [facility]ist

## 2017-09-15 ENCOUNTER — HOSPITAL ENCOUNTER (OUTPATIENT)
Age: 57
Discharge: HOME | End: 2017-09-15
Attending: PHYSICIAN ASSISTANT
Payer: COMMERCIAL

## 2017-09-15 PROCEDURE — 73721 MRI JNT OF LWR EXTRE W/O DYE: CPT

## 2017-09-18 RX ORDER — DIAZEPAM 5 MG/1
TABLET ORAL
Qty: 40 TAB | Refills: 0 | Status: SHIPPED | OUTPATIENT
Start: 2017-09-18 | End: 2017-10-25 | Stop reason: SDUPTHER

## 2017-09-19 ENCOUNTER — TELEPHONE (OUTPATIENT)
Dept: INTERNAL MEDICINE CLINIC | Age: 57
End: 2017-09-19

## 2017-09-26 ENCOUNTER — OFFICE VISIT (OUTPATIENT)
Dept: ORTHOPEDIC SURGERY | Age: 57
End: 2017-09-26

## 2017-09-26 VITALS
RESPIRATION RATE: 18 BRPM | TEMPERATURE: 98.9 F | HEIGHT: 72 IN | SYSTOLIC BLOOD PRESSURE: 134 MMHG | WEIGHT: 250.2 LBS | OXYGEN SATURATION: 98 % | BODY MASS INDEX: 33.89 KG/M2 | HEART RATE: 73 BPM | DIASTOLIC BLOOD PRESSURE: 83 MMHG

## 2017-09-26 DIAGNOSIS — M17.12 PRIMARY OSTEOARTHRITIS OF LEFT KNEE: ICD-10-CM

## 2017-09-26 DIAGNOSIS — S83.241A ACUTE MEDIAL MENISCUS TEAR, RIGHT, INITIAL ENCOUNTER: ICD-10-CM

## 2017-09-26 DIAGNOSIS — M17.11 PRIMARY OSTEOARTHRITIS OF RIGHT KNEE: Primary | ICD-10-CM

## 2017-09-26 DIAGNOSIS — S83.242A ACUTE MEDIAL MENISCUS TEAR, LEFT, INITIAL ENCOUNTER: ICD-10-CM

## 2017-09-26 NOTE — PATIENT INSTRUCTIONS
Arthritis: Care Instructions  Your Care Instructions  Arthritis, also called osteoarthritis, is a breakdown of the cartilage that cushions your joints. When the cartilage wears down, your bones rub against each other. This causes pain and stiffness. Many people have some arthritis as they age. Arthritis most often affects the joints of the spine, hands, hips, knees, or feet. You can take simple measures to protect your joints, ease your pain, and help you stay active. Follow-up care is a key part of your treatment and safety. Be sure to make and go to all appointments, and call your doctor if you are having problems. It's also a good idea to know your test results and keep a list of the medicines you take. How can you care for yourself at home? · Stay at a healthy weight. Being overweight puts extra strain on your joints. · Talk to your doctor or physical therapist about exercises that will help ease joint pain. ¨ Stretch. You may enjoy gentle forms of yoga to help keep your joints and muscles flexible. ¨ Walk instead of jog. Other types of exercise that are less stressful on the joints include riding a bicycle, swimming, jorge l chi, or water exercise. ¨ Lift weights. Strong muscles help reduce stress on your joints. Stronger thigh muscles, for example, take some of the stress off of the knees and hips. Learn the right way to lift weights so you do not make joint pain worse. · Take your medicines exactly as prescribed. Call your doctor if you think you are having a problem with your medicine. · Take pain medicines exactly as directed. ¨ If the doctor gave you a prescription medicine for pain, take it as prescribed. ¨ If you are not taking a prescription pain medicine, ask your doctor if you can take an over-the-counter medicine. · Use a cane, crutch, walker, or another device if you need help to get around. These can help rest your joints.  You also can use other things to make life easier, such as a higher toilet seat and padded handles on kitchen utensils. · Do not sit in low chairs, which can make it hard to get up. · Put heat or cold on your sore joints as needed. Use whichever helps you most. You also can take turns with hot and cold packs. ¨ Apply heat 2 or 3 times a day for 20 to 30 minutes--using a heating pad, hot shower, or hot pack--to relieve pain and stiffness. ¨ Put ice or a cold pack on your sore joint for 10 to 20 minutes at a time. Put a thin cloth between the ice and your skin. When should you call for help? Call your doctor now or seek immediate medical care if:  · You have sudden swelling, warmth, or pain in any joint. · You have joint pain and a fever or rash. · You have such bad pain that you cannot use a joint. Watch closely for changes in your health, and be sure to contact your doctor if:  · You have mild joint symptoms that continue even with more than 6 weeks of care at home. · You have stomach pain or other problems with your medicine. Where can you learn more? Go to http://curtis-navarro.info/. Enter T652 in the search box to learn more about \"Arthritis: Care Instructions. \"  Current as of: November 28, 2016  Content Version: 11.3  © 8485-7764 [employer]. Care instructions adapted under license by [employer] (which disclaims liability or warranty for this information). If you have questions about a medical condition or this instruction, always ask your healthcare professional. [place] [address] any warranty or liability for your use of this information.

## 2017-09-26 NOTE — PROGRESS NOTES
Luis Tabares  1960   Chief Complaint   Patient presents with    Knee Pain     Bilateral        HISTORY OF PRESENT ILLNESS  Luis Tabares is a 59 y.o. male who presents today for reevaluation of bilateral knee pain and to review MRI results. At last OV, patient received a cortisone injection in the right knee and MRI was ordered. Pain is a 4/10 today. Worse with activity better with rest. He is having a hard time walking secondary to the pain. Describes a sharp catching sensation in his right knee and swelling. He states the pain in the left knee is worse today. He has trouble sleeping at night. He notes pain up into the thigh. He recalls he fell in March onto both of the knees. He works a desk job. Patient denies any fever, chills, chest pain, shortness of breath or calf pain. There are no new illness or injuries to report since last seen in the office. No changes in medications, allergies, social or family history. PHYSICAL EXAM:   Visit Vitals    /83 (BP 1 Location: Left arm, BP Patient Position: Sitting)    Pulse 73    Temp 98.9 °F (37.2 °C) (Oral)    Resp 18    Ht 6' (1.829 m)    Wt 250 lb 3.2 oz (113.5 kg)    SpO2 98%    BMI 33.93 kg/m2     The patient is a well-developed, well-nourished male   in no acute distress. The patient is alert and oriented times three. The patient is alert and oriented times three. Mood and affect are normal.  LYMPHATIC: lymph nodes are not enlarged and are within normal limits  SKIN: normal in color and non tender to palpation. There are no bruises or abrasions noted. NEUROLOGICAL: Motor sensory exam is within normal limits. Reflexes are equal bilaterally.  There is normal sensation to pinprick and light touch  MUSCULOSKELETAL:  Examination Left knee Right knee   Skin Intact Intact   Range of motion 0-130 0-130   Effusion - +   Medial joint line tenderness + +   Lateral joint line tenderness - -   Tenderness Pes Bursa - -   Tenderness insertion MCL - - Tenderness insertion LCL - -   Julios - -   Patella crepitus - -   Patella grind - -   Lachman - -   Pivot shift - -   Anterior drawer - -   Posterior drawer - -   Varus stress - -   Valgus stress - -   Neurovascular Intact Intact   Calf Swelling and Tenderness to Palpation - -   Jaydon's Test - -   Hamstring Cord Tightness - -        IMAGING:   MRI of the right knee dated 9/15/17 was reviewed and read:   IMPRESSION:  1. Complex tear in the medial meniscus posterior horn through body segment. Lateral meniscus intact. -Major ligaments in the right knee are intact. 2. Extensive patellofemoral and medial compartment patchy full-thickness cartilage loss. 3. Mild suprapatellar fat pad edema, may represent quadriceps fat pad impingement. Trace joint effusion. Mild superficial infrapatellar bursal edema. Mild popliteus insertion tendinopathy. MRI of the left knee dated 9/15/17 was reviewed and read:   IMPRESSION:  1. Medial meniscus with a focal tear at the posterior horn and body segment junction. Lateral meniscus intact. -Major ligaments are intact. 2. Patellofemoral compartment extensive full-thickness cartilage loss, grade 4. A few full-thickness cartilage fissures in the medial tibial plateau. 3. Superior patellar fat pad edema, may represent quadriceps fat pad impingement. Small joint effusion. Trace popliteal cyst. Mild superficial infrapatellar bursal edema. Mild popliteus and quadriceps tendinopathy. IMPRESSION:      ICD-10-CM ICD-9-CM    1. Primary osteoarthritis of right knee M17.11 715.16    2. Primary osteoarthritis of left knee M17.12 715.16    3. Acute medial meniscus tear, right, initial encounter S83.241A 836.0    4. Acute medial meniscus tear, left, initial encounter S83.242A 836.0         PLAN:   1. I discussed the results of the B/L knee MRIs and the treatment options with the patient. Patient would like to take some time to consider if he would like to proceed with surgery.  He will return when he is ready to schedule arthroscopy. Risk factors include: BMI>30  2. No cortisone injection indicated today right knee  3. No Physical/Occupational Therapy indicated today  4. No diagnostic test indicated today  5. No durable medical equipment indicated today  6. No referral indicated today   7. No medications indicated today:   8.  No Narcotic indicated today     RTC PRN    Follow-up Disposition: Not on [address], MD-C  [facility]

## 2017-10-25 ENCOUNTER — TELEPHONE (OUTPATIENT)
Dept: INTERNAL MEDICINE CLINIC | Age: 57
End: 2017-10-25

## 2017-10-25 RX ORDER — DIAZEPAM 5 MG/1
TABLET ORAL
Qty: 40 TAB | Refills: 0 | Status: SHIPPED | OUTPATIENT
Start: 2017-10-25 | End: 2017-12-07 | Stop reason: SDUPTHER

## 2017-11-05 RX ORDER — ROSUVASTATIN CALCIUM 20 MG/1
TABLET, FILM COATED ORAL
Qty: 90 TAB | Refills: 0 | Status: SHIPPED | OUTPATIENT
Start: 2017-11-05 | End: 2019-07-08 | Stop reason: SDUPTHER

## 2017-11-20 ENCOUNTER — TELEPHONE (OUTPATIENT)
Dept: INTERNAL MEDICINE CLINIC | Age: 57
End: 2017-11-20

## 2017-11-20 DIAGNOSIS — M25.50 ARTHRALGIA, UNSPECIFIED JOINT: Primary | ICD-10-CM

## 2017-11-21 NOTE — TELEPHONE ENCOUNTER
pls advise pt it usually takes 2-3 mos to see him  We can try Dr Vincenzo Montesinos or Virginia Curiel and she might be able to see sooner

## 2017-11-22 NOTE — TELEPHONE ENCOUNTER
Spoke with patient's wife, given message below and she said that pt will see first available in the rheumatology group. Aware that referral has been placed and we will contact her with appt info.

## 2017-11-28 NOTE — TELEPHONE ENCOUNTER
Patient wife calling says she talked to the Rheumatology Office Dr. Lucina Enriquez and they stated they haven't received a referral from our office yet

## 2017-12-04 NOTE — TELEPHONE ENCOUNTER
Patient is requesting referral to Dr. Edilson Padilla for arthritis. He is having a lot of pain in his knees. family/son

## 2017-12-07 RX ORDER — DIAZEPAM 5 MG/1
TABLET ORAL
Qty: 40 TAB | Refills: 0 | Status: SHIPPED | OUTPATIENT
Start: 2017-12-07 | End: 2018-01-17 | Stop reason: SDUPTHER

## 2017-12-08 ENCOUNTER — TELEPHONE (OUTPATIENT)
Dept: INTERNAL MEDICINE CLINIC | Age: 57
End: 2017-12-08

## 2017-12-17 DIAGNOSIS — Z00.00 PHYSICAL EXAM: ICD-10-CM

## 2017-12-18 RX ORDER — POTASSIUM CHLORIDE 750 MG/1
TABLET, EXTENDED RELEASE ORAL
Qty: 60 TAB | Refills: 0 | Status: SHIPPED | OUTPATIENT
Start: 2017-12-18 | End: 2018-03-29 | Stop reason: SDUPTHER

## 2017-12-18 RX ORDER — FUROSEMIDE 40 MG/1
TABLET ORAL
Qty: 90 TAB | Refills: 3 | Status: SHIPPED | OUTPATIENT
Start: 2017-12-18 | End: 2020-07-13

## 2017-12-19 DIAGNOSIS — Z00.00 PHYSICAL EXAM: ICD-10-CM

## 2017-12-20 DIAGNOSIS — E03.4 HYPOTHYROIDISM DUE TO ACQUIRED ATROPHY OF THYROID: ICD-10-CM

## 2017-12-20 RX ORDER — LEVOTHYROXINE SODIUM 88 UG/1
TABLET ORAL
Qty: 90 TAB | Refills: 1 | Status: SHIPPED | OUTPATIENT
Start: 2017-12-20 | End: 2018-03-29

## 2018-01-17 ENCOUNTER — TELEPHONE (OUTPATIENT)
Dept: INTERNAL MEDICINE CLINIC | Age: 58
End: 2018-01-17

## 2018-01-21 DIAGNOSIS — E55.9 HYPOVITAMINOSIS D: ICD-10-CM

## 2018-01-21 RX ORDER — ERGOCALCIFEROL 1.25 MG/1
CAPSULE ORAL
Qty: 8 CAP | Refills: 12 | Status: SHIPPED | OUTPATIENT
Start: 2018-01-21 | End: 2019-01-23 | Stop reason: SDUPTHER

## 2018-02-19 DIAGNOSIS — F41.9 ANXIETY: ICD-10-CM

## 2018-02-19 DIAGNOSIS — E78.5 DYSLIPIDEMIA: ICD-10-CM

## 2018-02-19 NOTE — TELEPHONE ENCOUNTER
Last Visit: 06/19/2017 with MD Yenny Mccoy    Next Appointment: 06/26/2018 with MD Yenny Mccoy   Previous Refill Encounters: 11/05/2017 per MD Yenny Mccoy #[id number]     Requested Prescriptions     Pending Prescriptions Disp Refills    rosuvastatin (CRESTOR) 20 mg tablet 90 Tab 3     Sig: Take 1 Tab by mouth nightly.

## 2018-02-19 NOTE — TELEPHONE ENCOUNTER
Patient calling asking if RD can switch his RX to a 3 month supply instead of 1 month says he can get it for a dollar a month if he does 3 months.  Said he didn't  the other RX yet     Pls Advise

## 2018-02-20 ENCOUNTER — TELEPHONE (OUTPATIENT)
Dept: INTERNAL MEDICINE CLINIC | Age: 58
End: 2018-02-20

## 2018-02-20 RX ORDER — DIAZEPAM 5 MG/1
TABLET ORAL
Qty: 40 TAB | Refills: 0 | Status: SHIPPED | OUTPATIENT
Start: 2018-02-20 | End: 2018-03-20 | Stop reason: SDUPTHER

## 2018-02-20 RX ORDER — ROSUVASTATIN CALCIUM 20 MG/1
20 TABLET, COATED ORAL
Qty: 90 TAB | Refills: 3 | Status: SHIPPED | OUTPATIENT
Start: 2018-02-20 | End: 2018-03-29 | Stop reason: SDUPTHER

## 2018-03-19 NOTE — PROGRESS NOTES
59 y.o. WHITE OR  male who presents for eval    He is here because an abn ekg tracing was noted when he was undergoing cpap/bipap titration. Interestingly, he had cardiology w/u by Dr Suzie Phipps prior to his planned procedure in January and it came back fine. He has been completely asymptomatic,. Specifically no CP, sob, pnd, edema, palpitations, syncope, presyncope. He was started on glenn for elev bp at the time.     Past Medical History:   Diagnosis Date    Anxiety     LOPEZ-7 was 11/21 from 7/11    Arthritis     djd on mri c spine Dr. Nishant Jaramillo 3/13; knees bilat 2017    Colon polyps 2008    Dr. Veena Mcgregor CVA (cerebral vascular accident) [facility]) 2006    Dr. Jaylin Tyler s/p PFO closure, carotids negative    Depression     PHQ-9 was 7/27 from (7/11); 10/27 from (5/15)    Diverticula of colon     Dyslipidemia     intol lipitor, prava, zocor; tolerated mevacor    FHx: heart disease     GERD (gastroesophageal reflux disease)     Hearing loss 2016    right sided; s/p myringotomy Dr Balbir Cobos; now seeing diff ENT    Hemorrhoid     Hypogonadism male 2011    Dr Beard Pain; dc'ed replacement tx 2015    Hypothyroidism     Nephrolithiasis     Obesity (BMI 30-39.9)     peak weight 245 lbs, bmi 34.7 from 5/15; dec julia[ supp, med supervised wt loss, bariatric options    Peripheral neuropathy 2/13    on EMG negative serologies    PFO (patent foramen ovale) s/p closure Dr. Bee Olsen 2006     Prediabetes     Primary hypertension 3/22/2018    Seasonal allergies     Sleep apnea 2006    Dr. Alisha Kahn coat hypertension      Past Surgical History:   Procedure Laterality Date    CARDIAC SURG PROCEDURE UNLIST  5/16    ETT negative    CARDIAC SURG PROCEDURE UNLIST  01/2018    echo nl lv, ef 60%, mild dd, mild/mod lvh, tr AR, aortic root 4cm, well seated pfo closure device Dr Opal Orr 2008 polyp; Dr Anny Abbott 2/3/12 neg    HX HERNIA REPAIR      HX KNEE ARTHROSCOPY      HX ORTHOPAEDIC      knee surgery    HX TONSILLECTOMY       Social History     Social History    Marital status:      Spouse name: N/A    Number of children: 2    Years of education: N/A     Occupational History    auto parts worker      Social History Main Topics    Smoking status: Never Smoker    Smokeless tobacco: Never Used    Alcohol use No    Drug use: No    Sexual activity: Yes     Partners: Female     Other Topics Concern    Not on file     Social History Narrative     Allergies   Allergen Reactions    Amoxicillin (Bulk) Unknown (comments)     Rash around collar area    Codeine Other (comments)    Cymbalta [Duloxetine] Other (comments)     Gi upset    Erythromycin Diarrhea    Lipitor [Atorvastatin] Myalgia    Pcn [Penicillins] Rash    Pravastatin Myalgia    Septra [Sulfamethoprim Ds] Unknown (comments)    Simvastatin Myalgia     Current Outpatient Prescriptions on File Prior to Visit   Medication Sig Dispense Refill    rosuvastatin (CRESTOR) 20 mg tablet Take 1 Tab by mouth nightly. 90 Tab 3    VITAMIN D2 50,000 unit capsule TAKE ONE CAPSULE BY MOUTH TWICE A WEEK 8 Cap 12    furosemide (LASIX) 40 mg tablet TAKE ONE TABLET BY MOUTH ONCE DAILY 90 Tab 3    CRESTOR 20 mg tablet TAKE ONE TABLET BY MOUTH ONCE DAILY (NIGHTLY) 90 Tab 0    levothyroxine (SYNTHROID) 100 mcg tablet TAKE ONE TABLET BY MOUTH ONCE DAILY BEFORE  BREAKFAST 90 Tab 3    fluticasone (FLONASE) 50 mcg/actuation nasal spray USE TWO SPRAYS IN EACH NOSTRIL ONCE DAILY 1 Bottle 3    KLOR-CON M10 10 mEq tablet TAKE ONE TABLET BY MOUTH TWICE DAILY 60 Tab 012    omeprazole (PRILOSEC) 20 mg capsule Take 20 mg by mouth daily.  aspirin delayed-release 81 mg tablet Take 81 mg by mouth daily.       diazePAM (VALIUM) 5 mg tablet TAKE 1 TABLET BY MOUTH EVERY 6 HOURS AS NEEDED 40 Tab 0    levothyroxine (SYNTHROID) 88 mcg tablet TAKE ONE TABLET BY MOUTH ONCE DAILY BEFORE  BREAKFAST 90 Tab 1    KLOR-CON M10 10 mEq tablet TAKE ONE TABLET BY MOUTH TWICE DAILY 60 Tab 0    multivitamin (ONE A DAY) tablet Take 1 Tab by mouth daily. No current facility-administered medications on file prior to visit. REVIEW OF SYSTEMS: colo 2/12 Dr Yolanda Cui  Ophtho  no vision change or eye pain  Oral  no mouth pain, tongue or tooth problems  Ears  no hearing loss, ear pain, fullness, no swallowing problems  Cardiac  no CP, PND, orthopnea, edema, palpitations or syncope  Chest  no breast masses  Resp  no wheezing, chronic coughing, dyspnea  GI  no heartburn, nausea, vomiting, change in bowel habits, bleeding, hemorrhoids  Urinary  no dysuria, hematuria, flank pain, urgency, frequency  Neuro  no focal weakness, numbness, paresthesias, incoordination, ataxia, involuntary movements  Endo - no polyuria, polydipsia, nocturia, hot flashes    Visit Vitals    /76    Pulse 76    Temp 98.8 °F (37.1 °C) (Oral)    Resp 14    Ht 6' (1.829 m)    Wt 249 lb (112.9 kg)    SpO2 97%    BMI 33.77 kg/m2   A&O x3  Affect is appropriate. Mood stable  No apparent distress  Anicteric, no JVD, adenopathy or thyromegaly. No carotid bruits or radiated murmur  Lungs clear to auscultation, no wheezes or rales  Heart showed regular rate and rhythm. No  rubs, gallops. 2/6 jodie lsb without rad  Abdomen soft nontender, no hepatosplenomegaly or masses. Extremities without edema.   Pulses 1-2+ symmetrically    LABS  From 2/10 showed   gluc 93,   cr 0.80,      alt 33,               chol 188, tg 74, hdl 45, ldl-c 128,             wbc 4.3, hb 14.3, plt 197,              psa 0.70, ua neg  From 10/11 showed gluc 102, cr 0.89, gfr 101, alt 25,           ldl-p 1499,             tsh 5.03, wbc 3.7, hb 13.4, plt 231  From 12/11 showed                     tsh 2.24,          ft4 1.02,  tpo ab+  From 4/12 showed                     tsh 4.18  From 9/12 showed   gluc 106, cr 0.70, gfr>60,  alt 34,           ldl-p 1839, chol 191, tg 76, hdl 42, ldl-c 134  From 1/13 showed gluc 100,       hba1c 6.0,      chol 174, tg 52, hdl 35, ldl-c 127,                        vit d 22.3  From 4/13 showed         hba1c 5.8,                         b12 499,  fol 12.5, spep neg, guera neg  From 1/14 showed   gluc 97,   cr 0.90, gfr>60,  alt 26, hba1c 5.8,              chol 190, tg 85, hdl 44, ldl-c 129, tsh 3.72, wbc 4.9, hb 13.4, plt 262, vit d 29.2  From 5/15 showed   gluc 100, cr 0.91, gfr 94,   alt 24,       chol 177, tg 79, hdl 44, ldl-c 117, tsh 4.35, wbc 5.5, hb 13.1, plt 240  From 5/15 showed                     tsh 4.34,           b12 393,  fol 11.3  From 9/15 showed            chol 183, tg 96, hdl 47, ldl-c 117, tsh 3.00,          vit d 29.0  From 5/16 showed   gluc 99,   cr 0.90, gfr>60, alt 21, hba1c 6.0,               tsh 4.73, wbc 4.4, hb 13.3, plt 222  From 1/17 showed   gluc 104, cr 0.70, gfr>60, alt 25,       chol 157, tg 71, hdl 46, ldl-c 97,             wbc 3.6, hb 13.2, plt 240, vit d 24.2, tsh 2.41, psa 1.24  From 6/17 showed   gluc 104, cr 0.80, gfr>60, alt 20, hba1c 6.0,     chol 105, tg 34, hdl 59, ldl-c 40,          vit d 37.0  From 12/17 showed                            RA neg, CCP neg, crp 0.1    Review of tracing shows run of wide complex tachycardia    Patient Active Problem List   Diagnosis Code    CVA Dr. Molly Holly 2006 I63.9    Sleep Apnea Dr. Lucia Anguiano 2006 G47.30    Anxiety and depression 2011 F41.9    Colon polyps and diverticulosis Dr. Willie Younger 2008 K63.5    Hypogonadism Dr. Lucinda Nick 2011 E29.1    Dyslipidemia E78.5    Hypovitaminosis D E55.9    Neuropathy G62.9    Hypothyroidism due to acquired atrophy of thyroid E03.4    Gastroesophageal reflux disease without esophagitis K21.9    Obesity (BMI 30-39. 9) E66.9    Prediabetes R73.03    Primary osteoarthritis involving multiple joints Dr Francis Child M15.0    Primary hypertension I10     Assessment and plan:  1. HLP. Continue current regimen and doing well  2. Hypovit d. Check labs next draw  3. Hypothyroidism. Therapeutic dosing at last check  4. Hypogonadism. F/U Dr. Dillon Moh as needed, he is off replacement. 5. GERD. PPI and avoidance measures  6. Prediabetes. Lifestyle and dietary measures, wt loss would be ideal  7. Neuropathy. Declined meds   8. Polyps and diverticulosis. Fiber, f/u Dr Job Frey 2018  9. Obesity. Declined julia supp, med supervised wt loss, bariatrics  10. Tachycardia. He wishes opinion w Dr Alpa Alcaraz which will be arranged        RTC 6/18      Above conditions discussed at length and patient vocalized understanding.   All questions answered to patient satifaction

## 2018-03-20 DIAGNOSIS — F41.9 ANXIETY: ICD-10-CM

## 2018-03-20 RX ORDER — DIAZEPAM 5 MG/1
TABLET ORAL
Qty: 40 TAB | Refills: 0 | Status: SHIPPED | OUTPATIENT
Start: 2018-03-20 | End: 2018-03-21 | Stop reason: SDUPTHER

## 2018-03-21 ENCOUNTER — TELEPHONE (OUTPATIENT)
Dept: INTERNAL MEDICINE CLINIC | Age: 58
End: 2018-03-21

## 2018-03-21 DIAGNOSIS — F41.9 ANXIETY: ICD-10-CM

## 2018-03-22 ENCOUNTER — OFFICE VISIT (OUTPATIENT)
Dept: INTERNAL MEDICINE CLINIC | Age: 58
End: 2018-03-22

## 2018-03-22 VITALS
HEART RATE: 76 BPM | BODY MASS INDEX: 33.72 KG/M2 | OXYGEN SATURATION: 97 % | SYSTOLIC BLOOD PRESSURE: 124 MMHG | HEIGHT: 72 IN | DIASTOLIC BLOOD PRESSURE: 76 MMHG | TEMPERATURE: 98.8 F | WEIGHT: 249 LBS | RESPIRATION RATE: 14 BRPM

## 2018-03-22 DIAGNOSIS — R00.0 TACHYCARDIA: ICD-10-CM

## 2018-03-22 DIAGNOSIS — I10 PRIMARY HYPERTENSION: Primary | ICD-10-CM

## 2018-03-22 RX ORDER — DIAZEPAM 5 MG/1
TABLET ORAL
Qty: 40 TAB | Refills: 0 | Status: SHIPPED | OUTPATIENT
Start: 2018-03-22 | End: 2018-04-30 | Stop reason: SDUPTHER

## 2018-03-22 RX ORDER — LISINOPRIL 2.5 MG/1
TABLET ORAL DAILY
COMMUNITY
End: 2019-09-25 | Stop reason: SDUPTHER

## 2018-03-22 NOTE — PROGRESS NOTES
1. Have you been to the ER, urgent care clinic or hospitalized since your last visit? NO.     2. Have you seen or consulted any other health care providers outside of the [address] [place] [address] since your last visit (Include any pap smears or colon screening)? NO      Do you have an Advanced Directive? NO    Would you like information on Advanced Directives?  NO      Chief Complaint   Patient presents with    Sleep Problem     Patient wants to go over some issues that was notated on his sleep study

## 2018-03-22 NOTE — MR AVS SNAPSHOT
Ashley Blake 
 
 
 [address], [address] [facility] 
[phone] Patient: Ji Ferguson MRN: [id number] KMS:[birth year] Visit Information Date & Time Provider Department Dept. Phone Encounter #  
 3/22/2018 11:20 AM Manda Alexandra MD [facility] [address] [place] [address] [phone] [id number] Your Appointments 6/19/2018  8:05 AM  
LAB with [facility] NURSE VISIT [facility] [address] [place] [address] ([address] [facility]) Appt Note: lab  
 [address], [address] [place]  
  
   
 [address], [address]  
  
    
 6/26/2018  8:20 AM  
PHYSICAL with Manda Alexandra MD  
[facility] [address] [place] [address]) Appt Note: [address]  
 [address] [facility], [address] [place]  
  
   
 [address], [address] Upcoming Health Maintenance Date Due Influenza Age 5 to Adult 8/1/2017 COLONOSCOPY 2/3/2022 DTaP/Tdap/Td series (2 - Td) 2/4/2024 Allergies as of 3/22/2018  Review Complete On: 3/22/2018 By: Ishan Baptiste Severity Noted Reaction Type Reactions Amoxicillin (Bulk)    Unknown (comments) Rash around collar area Codeine  01/04/2010    Other (comments) Cymbalta [Duloxetine]  09/10/2015    Other (comments) Gi upset Erythromycin    Diarrhea Lipitor [Atorvastatin]  09/24/2012    Myalgia Pcn [Penicillins]  01/04/2010    Rash Pravastatin  01/17/2013    Myalgia Septra [Sulfamethoprim Ds]  07/18/2011    Unknown (comments) Simvastatin  09/10/2015    Myalgia Current Immunizations  Reviewed on 6/19/2017 Name Date H1N1 FLU VACCINE 2/19/2010 Influenza Vaccine 10/1/2016, 10/1/2015, 10/1/2014, 9/1/2013 Influenza Vaccine Nasal 9/1/2011 Influenza Vaccine Whole 1/1/2009 TD Vaccine 10/1/2009 Tdap 2/4/2014 Zoster 10/26/2011 Not reviewed this visit Vitals BP Pulse Temp Resp Height(growth percentile) Weight(growth percentile) 124/76 76 98.8 °F (37.1 °C) (Oral) 14 6' (1.829 m) 249 lb (112.9 kg) SpO2 BMI Smoking Status 97% 33.77 kg/m2 Never Smoker Vitals History BMI and BSA Data Body Mass Index Body Surface Area  
 33.77 kg/m 2 2.39 m 2 Preferred Pharmacy Pharmacy Name Phone [facility] [address] [phone] Your Updated Medication List  
  
   
This list is accurate as of 3/22/18 12:00 PM.  Always use your most recent med list.  
  
  
  
  
 aspirin delayed-release 81 mg tablet Take 81 mg by mouth daily. * CRESTOR 20 mg tablet Generic drug:  rosuvastatin TAKE ONE TABLET BY MOUTH ONCE DAILY (NIGHTLY) * rosuvastatin 20 mg tablet Commonly known as:  CRESTOR Take 1 Tab by mouth nightly. diazePAM 5 mg tablet Commonly known as:  VALIUM  
TAKE 1 TABLET BY MOUTH EVERY 6 HOURS AS NEEDED  
  
 fluticasone 50 mcg/actuation nasal spray Commonly known as:  FLONASE  
USE TWO SPRAYS IN EACH NOSTRIL ONCE DAILY  
  
 furosemide 40 mg tablet Commonly known as:  LASIX TAKE ONE TABLET BY MOUTH ONCE DAILY  
  
 * KLOR-CON M10 10 mEq tablet Generic drug:  potassium chloride TAKE ONE TABLET BY MOUTH TWICE DAILY  
  
 * KLOR-CON M10 10 mEq tablet Generic drug:  potassium chloride TAKE ONE TABLET BY MOUTH TWICE DAILY * levothyroxine 100 mcg tablet Commonly known as:  SYNTHROID  
TAKE ONE TABLET BY MOUTH ONCE DAILY BEFORE  BREAKFAST * levothyroxine 88 mcg tablet Commonly known as:  SYNTHROID  
TAKE ONE TABLET BY MOUTH ONCE DAILY BEFORE  BREAKFAST  
  
 lisinopril 2.5 mg tablet Commonly known as:  Nimo Fair Take  by mouth daily. Indications: hypertension  
  
 multivitamin tablet Commonly known as:  ONE A DAY Take 1 Tab by mouth daily. PriLOSEC 20 mg capsule Generic drug:  omeprazole Take 20 mg by mouth daily. VITAMIN D2 50,000 unit capsule Generic drug:  ergocalciferol TAKE ONE CAPSULE BY MOUTH TWICE A WEEK * Notice: This list has 6 medication(s) that are the same as other medications prescribed for you. Read the directions carefully, and ask your doctor or other care provider to review them with you. Introducing [facility]! Dear Benton Ramirez: 
Thank you for requesting a [employer] account. Our records indicate that you already have an active [employer] account. You can access your account anytime at [email or web address] Did you know that you can access your hospital and ER discharge instructions at any time in [employer]? You can also review all of your test results from your hospital stay or ER visit. Additional Information If you have questions, please visit the Frequently Asked Questions section of the [employer] website at [email or web address] Remember, [employer] is NOT to be used for urgent needs. For medical emergencies, dial 911. Now available from your iPhone and Android! Please provide this summary of care documentation to your next provider. Your primary care clinician is listed as Micha Strong. If you have any questions after today's visit, please call [phone].

## 2018-03-29 ENCOUNTER — OFFICE VISIT (OUTPATIENT)
Dept: CARDIOLOGY CLINIC | Age: 58
End: 2018-03-29

## 2018-03-29 VITALS
HEART RATE: 78 BPM | BODY MASS INDEX: 32.51 KG/M2 | WEIGHT: 240 LBS | SYSTOLIC BLOOD PRESSURE: 122 MMHG | DIASTOLIC BLOOD PRESSURE: 84 MMHG | RESPIRATION RATE: 18 BRPM | HEIGHT: 72 IN | OXYGEN SATURATION: 98 %

## 2018-03-29 DIAGNOSIS — Z87.74 S/P PERCUTANEOUS PATENT FORAMEN OVALE CLOSURE: ICD-10-CM

## 2018-03-29 DIAGNOSIS — I10 ESSENTIAL HYPERTENSION: Primary | ICD-10-CM

## 2018-03-29 DIAGNOSIS — E78.5 DYSLIPIDEMIA: ICD-10-CM

## 2018-03-29 DIAGNOSIS — I63.9 CEREBROVASCULAR ACCIDENT (CVA), UNSPECIFIED MECHANISM (HCC): ICD-10-CM

## 2018-03-29 DIAGNOSIS — G47.33 OBSTRUCTIVE SLEEP APNEA SYNDROME: ICD-10-CM

## 2018-03-29 NOTE — PROGRESS NOTES
HISTORY OF PRESENT ILLNESS  Abigail Acosta is a 59 y.o. male. HPI    Patient presents for new office visit. He was referred here for an abnormal rhythm strip during a sleep study. He has a past medical history significant for a prior CVA, suspected cardioembolic due to a patent foramen ovale which underwent percutaneous closure in 2006. He also has a history of hypertension, dyslipidemia, and obstructive sleep apnea. He recently underwent an echocardiogram in January 2018 as part of a preoperative workup prior to undergoing left knee surgery. His echocardiogram demonstrated preserved LV systolic function, EF 36%, mild concentric LVH, grade 1 diastolic dysfunction, dilated aortic root with a well-seated intra-atrial septal occluder device without residual intra-atrial shunt. He recently underwent a sleep study, where he was found to have a brief nonsustained run of an accelerated idioventricular rhythm. He denies any recent chest pain or pressure. No significant exertional dyspnea above his baseline. He does have chronic leg swelling which worsens throughout the day and improves at night. He does take Lasix for this intermittently. He denies any dizziness, palpitations, syncope or near syncope. His activity tolerance has not significantly changed over the past 6-12 months.     Past Medical History:   Diagnosis Date    Anxiety     LOPEZ-7 was 11/21 from 7/11    Arthritis     djd on mri c spine Dr. Graham León 3/13; knees bilat 2017    Colon polyps 2008    Dr. Juanjose Oseguera CVA (cerebral vascular accident) [facility]) 2006    Dr. Lily Lugo s/p PFO closure, carotids negative    Depression     PHQ-9 was 7/27 from (7/11); 10/27 from (5/15)    Diverticula of colon     Dyslipidemia     intol lipitor, prava, zocor; tolerated mevacor    FHx: heart disease     GERD (gastroesophageal reflux disease)     Hearing loss 2016    right sided; s/p myringotomy Dr Vivien Hatchet; now seeing diff ENT    Hemorrhoid     Hypogonadism male 2011    Dr Lucinda Nick; dc'ed replacement tx 2015    Hypothyroidism     Nephrolithiasis     Obesity (BMI 30-39.9)     peak weight 245 lbs, bmi 34.7 from 5/15; dec julia[ supp, med supervised wt loss, bariatric options    Peripheral neuropathy 2/13    on EMG negative serologies    PFO (patent foramen ovale) s/p closure Dr. Serg Ramon 2006     Prediabetes     Primary hypertension 3/22/2018    Seasonal allergies     Sleep apnea 2006    Dr. Hilbert Lombard White coat hypertension      Current Outpatient Prescriptions   Medication Sig Dispense Refill    diazePAM (VALIUM) 5 mg tablet TAKE 1 TABLET BY MOUTH EVERY 6 HOURS AS NEEDED 40 Tab 0    lisinopril (PRINIVIL, ZESTRIL) 2.5 mg tablet Take  by mouth daily. Indications: hypertension      VITAMIN D2 50,000 unit capsule TAKE ONE CAPSULE BY MOUTH TWICE A WEEK 8 Cap 12    furosemide (LASIX) 40 mg tablet TAKE ONE TABLET BY MOUTH ONCE DAILY 90 Tab 3    CRESTOR 20 mg tablet TAKE ONE TABLET BY MOUTH ONCE DAILY (NIGHTLY) 90 Tab 0    levothyroxine (SYNTHROID) 100 mcg tablet TAKE ONE TABLET BY MOUTH ONCE DAILY BEFORE  BREAKFAST 90 Tab 3    fluticasone (FLONASE) 50 mcg/actuation nasal spray USE TWO SPRAYS IN EACH NOSTRIL ONCE DAILY 1 Bottle 3    KLOR-CON M10 10 mEq tablet TAKE ONE TABLET BY MOUTH TWICE DAILY 60 Tab 012    omeprazole (PRILOSEC) 20 mg capsule Take 20 mg by mouth daily.  aspirin delayed-release 81 mg tablet Take 81 mg by mouth daily.        Allergies   Allergen Reactions    Amoxicillin (Bulk) Unknown (comments)     Rash around collar area    Codeine Other (comments)    Cymbalta [Duloxetine] Other (comments)     Gi upset    Erythromycin Diarrhea    Lipitor [Atorvastatin] Myalgia    Pcn [Penicillins] Rash    Pravastatin Myalgia    Septra [Sulfamethoprim Ds] Unknown (comments)    Simvastatin Myalgia      Social History   Substance Use Topics    Smoking status: Never Smoker    Smokeless tobacco: Never Used    Alcohol use No     Family History Problem Relation Age of Onset    Hypertension Mother     Cancer Mother      renal and kisney cancer   [facility] Arthritis-rheumatoid Mother     Hypertension Father     Heart Disease Father            Review of Systems   Constitutional: Negative for chills, fever and weight loss. HENT: Negative for nosebleeds. Eyes: Negative for blurred vision and double vision. Respiratory: Positive for shortness of breath. Negative for cough and wheezing. Cardiovascular: Positive for leg swelling. Negative for chest pain, palpitations, orthopnea, claudication and PND. Gastrointestinal: Negative for abdominal pain, heartburn, nausea and vomiting. Genitourinary: Negative for dysuria and hematuria. Musculoskeletal: Negative for falls and myalgias. Skin: Negative for rash. Neurological: Negative for dizziness, focal weakness and headaches. Endo/Heme/Allergies: Does not bruise/bleed easily. Psychiatric/Behavioral: Negative for substance abuse. Visit Vitals    /84 (BP 1 Location: Left arm, BP Patient Position: Sitting)    Pulse 78    Resp 18    Ht 6' 0.01\" (1.829 m)    Wt 108.9 kg (240 lb)    SpO2 98%    BMI 32.54 kg/m2       Physical Exam   Constitutional: He is oriented to person, place, and time. He appears well-developed and well-nourished. HENT:   Head: Normocephalic and atraumatic. Eyes: Conjunctivae are normal.   Neck: Neck supple. No JVD present. Carotid bruit is not present. Cardiovascular: Normal rate, regular rhythm, S1 normal, S2 normal and normal pulses. Exam reveals no gallop and no S3. No murmur heard. Pulmonary/Chest: Breath sounds normal. He has no wheezes. He has no rales. Abdominal: Soft. Bowel sounds are normal. There is no tenderness. Musculoskeletal: He exhibits no edema, tenderness or deformity. Neurological: He is alert and oriented to person, place, and time. Skin: Skin is warm and dry. Psychiatric: He has a normal mood and affect.  His behavior is normal. Thought content normal.     EKG: Normal sinus rhythm, normal axis, normal QTc interval, no ST/T wave abnormalities concerning for ischemia. No change compared to the previous EKG. ASSESSMENT and PLAN  Encounter Diagnoses   Name Primary?  Essential hypertension Yes    Dyslipidemia     S/P percutaneous patent foramen ovale closure     Cerebrovascular accident (CVA), unspecified mechanism ([facility] [address]     Obstructive sleep apnea syndrome      Abnormal rhythm strip. Patient brought his tracing from his sleep study. He appeared to be in sinus rhythm with brief runs of an accelerated idioventricular rhythm. He had a structurally normal heart on echocardiogram in January 2018. He does not have any concerning symptoms for angina or heart failure. No further  workup needed at this time. History of patent foramen ovale. Status post percutaneous closure device in 2006. This had normal appearance on a recent echocardiogram earlier this year. There was no residual shunt. Essential hypertension. This had been labile in the past.  He is recently started on a low-dose ACE inhibitor. His blood pressure appears to be well-controlled on this regimen. Dyslipidemia. Patient is currently taking Crestor 20 mg daily. This is followed by his PCP. Remote CVA. This occurred in 2006. He has remained on an aspirin and a statin for secondary prevention. Dependent edema. Patient has been taking Lasix, which he could continue. Patient can follow-up annually, sooner if needed.

## 2018-03-29 NOTE — PROGRESS NOTES
1. Have you been to the ER, urgent care clinic since your last visit? Hospitalized since your last visit? No    2. Have you seen or consulted any other health care providers outside of the [address] [place] [address] since your last visit? Include any pap smears or colon screening.  No

## 2018-03-29 NOTE — MR AVS SNAPSHOT
[address] [place] [address] [address] [place] [address] 
[phone] Patient: Vinicio Doe MRN: [id number] Visit Information Date & Time Provider Department Dept. Phone Encounter #  
 3/29/2018 12:40 PM Severo Patee, MD [facility] [address] [id number] Your Appointments 6/19/2018  8:05 AM  
LAB with [facility] NURSE VISIT [facility] ([address] [place] [address] Road) Appt Note: lab  
 [address], [address] [place] [address] [place]  
  
   
 [address], [address]  
  
    
 6/26/2018  8:20 AM  
PHYSICAL with Savanna Degroot MD  
[facility] [address]) Appt Note: rpe rd  
 [address] [facility], [address] [place] [address]  
  
   
 [address], [address] Upcoming Health Maintenance Date Due Influenza Age 5 to Adult 8/1/2017 COLONOSCOPY 2/3/2022 DTaP/Tdap/Td series (2 - Td) 2/4/2024 Allergies as of 3/29/2018  Review Complete On: 3/29/2018 By: Jun Abraham Severity Noted Reaction Type Reactions Amoxicillin (Bulk)    Unknown (comments) Rash around collar area Codeine  01/04/2010    Other (comments) Cymbalta [Duloxetine]  09/10/2015    Other (comments) Gi upset Erythromycin    Diarrhea Lipitor [Atorvastatin]  09/24/2012    Myalgia Pcn [Penicillins]  01/04/2010    Rash Pravastatin  01/17/2013    Myalgia Septra [Sulfamethoprim Ds]  07/18/2011    Unknown (comments) Simvastatin  09/10/2015    Myalgia Current Immunizations  Reviewed on 6/19/2017 Name Date H1N1 FLU VACCINE 2/19/2010 Influenza Vaccine 10/1/2016, 10/1/2015, 10/1/2014, 9/1/2013 Influenza Vaccine Nasal 9/1/2011 Influenza Vaccine Whole 1/1/2009 TD Vaccine 10/1/2009 Tdap 2/4/2014 Zoster 10/26/2011 Not reviewed this visit You Were Diagnosed With   
  
 Codes Comments Primary hypertension    -  Primary ICD-10-CM: I10 
ICD-9-CM: 401.9 Dyslipidemia     ICD-10-CM: E78.5 ICD-9-CM: 272.4 Vitals BP Pulse Resp Height(growth percentile) Weight(growth percentile) SpO2  
 122/84 (BP 1 Location: Left arm, BP Patient Position: Sitting) 78 18 6' 0.01\" (1.829 m) 240 lb (108.9 kg) 98% BMI Smoking Status 32.54 kg/m2 Never Smoker BMI and BSA Data Body Mass Index Body Surface Area 32.54 kg/m 2 2.35 m 2 Preferred Pharmacy Pharmacy Name Phone [facility] [address] [facility], [address] [facility] [phone] Your Updated Medication List  
  
   
This list is accurate as of 3/29/18  1:15 PM.  Always use your most recent med list.  
  
  
  
  
 aspirin delayed-release 81 mg tablet Take 81 mg by mouth daily. CRESTOR 20 mg tablet Generic drug:  rosuvastatin TAKE ONE TABLET BY MOUTH ONCE DAILY (NIGHTLY) diazePAM 5 mg tablet Commonly known as:  VALIUM  
TAKE 1 TABLET BY MOUTH EVERY 6 HOURS AS NEEDED  
  
 fluticasone 50 mcg/actuation nasal spray Commonly known as:  FLONASE  
USE TWO SPRAYS IN EACH NOSTRIL ONCE DAILY  
  
 furosemide 40 mg tablet Commonly known as:  LASIX TAKE ONE TABLET BY MOUTH ONCE DAILY  
  
 KLOR-CON M10 10 mEq tablet Generic drug:  potassium chloride TAKE ONE TABLET BY MOUTH TWICE DAILY  
  
 levothyroxine 100 mcg tablet Commonly known as:  SYNTHROID  
TAKE ONE TABLET BY MOUTH ONCE DAILY BEFORE  BREAKFAST  
  
 lisinopril 2.5 mg tablet Commonly known as:  Ladona Dunks Take  by mouth daily. Indications: hypertension PriLOSEC 20 mg capsule Generic drug:  omeprazole Take 20 mg by mouth daily. VITAMIN D2 50,000 unit capsule Generic drug:  ergocalciferol TAKE ONE CAPSULE BY MOUTH TWICE A WEEK We Performed the Following AMB POC EKG ROUTINE W/ 12 LEADS, INTER & REP [66389 CPT(R)] Introducing [facility] & HEALTH SERVICES! Dear Enedina Paredess: 
Thank you for requesting a [employer] account. Our records indicate that you already have an active [employer] account. You can access your account anytime at [email or web address]. [email or web address] Did you know that you can access your hospital and ER discharge instructions at any time in [employer]? You can also review all of your test results from your hospital stay or ER visit. Additional Information If you have questions, please visit the Frequently Asked Questions section of the [employer] website at [email or web address] Remember, [employer] is NOT to be used for urgent needs. For medical emergencies, dial 911. Now available from your iPhone and Android! Please provide this summary of care documentation to your next provider. Your primary care clinician is listed as Melodi Cranker. If you have any questions after today's visit, please call [phone].

## 2018-04-06 RX ORDER — POTASSIUM CHLORIDE 750 MG/1
TABLET, EXTENDED RELEASE ORAL
Qty: 60 TAB | Refills: 0 | Status: SHIPPED | OUTPATIENT
Start: 2018-04-06 | End: 2019-04-16 | Stop reason: SDUPTHER

## 2018-04-30 DIAGNOSIS — F41.9 ANXIETY: ICD-10-CM

## 2018-04-30 RX ORDER — DIAZEPAM 5 MG/1
TABLET ORAL
Qty: 40 TAB | Refills: 0 | Status: SHIPPED | OUTPATIENT
Start: 2018-04-30 | End: 2018-05-30 | Stop reason: DRUGHIGH

## 2018-05-01 ENCOUNTER — TELEPHONE (OUTPATIENT)
Dept: INTERNAL MEDICINE CLINIC | Age: 58
End: 2018-05-01

## 2018-05-30 ENCOUNTER — TELEPHONE (OUTPATIENT)
Dept: INTERNAL MEDICINE CLINIC | Age: 58
End: 2018-05-30

## 2018-05-30 DIAGNOSIS — F41.9 ANXIETY: ICD-10-CM

## 2018-05-30 DIAGNOSIS — Z00.00 PHYSICAL EXAM: Primary | ICD-10-CM

## 2018-05-30 RX ORDER — DIAZEPAM 10 MG/1
10 TABLET ORAL
Qty: 30 TAB | Refills: 0 | OUTPATIENT
Start: 2018-05-30 | End: 2018-06-26 | Stop reason: SDUPTHER

## 2018-05-30 RX ORDER — DIAZEPAM 5 MG/1
5 TABLET ORAL
Qty: 40 TAB | Refills: 0 | Status: CANCELLED | OUTPATIENT
Start: 2018-05-30

## 2018-05-30 NOTE — TELEPHONE ENCOUNTER
Pt going to [facility]. Hasn't been on plane in 25 years. Says at last visit he discussed with RD getting something called in that will help him get through the flights. Says he will be in the air for 20 hours.     Mina    Leaves [address]

## 2018-05-30 NOTE — TELEPHONE ENCOUNTER
Pt stated he can barley feel the effects of the current dosage of Valium. Pt is requesting something that will relax him but keep him coherent or increase in dosage.

## 2018-05-31 NOTE — TELEPHONE ENCOUNTER
Pt aware of message below and verbalized understanding. No further questions or concerns from pt at this time. Valium called into [facility] DR SERGE NANCE.

## 2018-06-04 ENCOUNTER — TELEPHONE (OUTPATIENT)
Dept: INTERNAL MEDICINE CLINIC | Age: 58
End: 2018-06-04

## 2018-06-04 NOTE — TELEPHONE ENCOUNTER
Spoke with [address] and canceled the script just called in for Valium 5 mg and advised them the valium 10 mg is the correct dosage.

## 2018-06-04 NOTE — TELEPHONE ENCOUNTER
[facility] DR SERGE NANCE is calling. Valium 5mg was phoned in this morning but on the 31st 10 mg was called in and that one is ready for . Need to verify which rx patient is to be getting.

## 2018-06-10 RX ORDER — FLUTICASONE PROPIONATE 50 MCG
SPRAY, SUSPENSION (ML) NASAL
Qty: 3 BOTTLE | Refills: 3 | Status: SHIPPED | OUTPATIENT
Start: 2018-06-10 | End: 2019-10-07 | Stop reason: SDUPTHER

## 2018-06-21 ENCOUNTER — TELEPHONE (OUTPATIENT)
Dept: INTERNAL MEDICINE CLINIC | Age: 58
End: 2018-06-21

## 2018-06-21 NOTE — TELEPHONE ENCOUNTER
Pt calling, says he was there today for labs. Waited 45 minutes and was told the girl couldn't find his vein and he was sent to [facility]. They wouldn't draw anything because they said it was showing labs were drawn here. He wants to just have his labs drawn here at his visit next week. I apologized for the inconvenience.

## 2018-06-26 ENCOUNTER — OFFICE VISIT (OUTPATIENT)
Dept: INTERNAL MEDICINE CLINIC | Age: 58
End: 2018-06-26

## 2018-06-26 VITALS
DIASTOLIC BLOOD PRESSURE: 78 MMHG | SYSTOLIC BLOOD PRESSURE: 108 MMHG | RESPIRATION RATE: 14 BRPM | BODY MASS INDEX: 34 KG/M2 | OXYGEN SATURATION: 98 % | HEART RATE: 72 BPM | TEMPERATURE: 99.1 F | HEIGHT: 72 IN | WEIGHT: 251 LBS

## 2018-06-26 DIAGNOSIS — I10 PRIMARY HYPERTENSION: ICD-10-CM

## 2018-06-26 DIAGNOSIS — L03.116 CELLULITIS OF LEFT LOWER EXTREMITY: ICD-10-CM

## 2018-06-26 DIAGNOSIS — E55.9 HYPOVITAMINOSIS D: ICD-10-CM

## 2018-06-26 DIAGNOSIS — M15.9 PRIMARY OSTEOARTHRITIS INVOLVING MULTIPLE JOINTS: ICD-10-CM

## 2018-06-26 DIAGNOSIS — Z87.74 S/P PERCUTANEOUS PATENT FORAMEN OVALE CLOSURE: ICD-10-CM

## 2018-06-26 DIAGNOSIS — I63.9 CEREBROVASCULAR ACCIDENT (CVA), UNSPECIFIED MECHANISM (HCC): ICD-10-CM

## 2018-06-26 DIAGNOSIS — E03.4 HYPOTHYROIDISM DUE TO ACQUIRED ATROPHY OF THYROID: ICD-10-CM

## 2018-06-26 DIAGNOSIS — Z00.00 PHYSICAL EXAM: Primary | ICD-10-CM

## 2018-06-26 DIAGNOSIS — K21.9 GASTROESOPHAGEAL REFLUX DISEASE WITHOUT ESOPHAGITIS: ICD-10-CM

## 2018-06-26 DIAGNOSIS — F41.9 ANXIETY: ICD-10-CM

## 2018-06-26 DIAGNOSIS — R73.03 PREDIABETES: ICD-10-CM

## 2018-06-26 DIAGNOSIS — E78.5 DYSLIPIDEMIA: ICD-10-CM

## 2018-06-26 DIAGNOSIS — E66.9 OBESITY (BMI 30-39.9): ICD-10-CM

## 2018-06-26 NOTE — PROGRESS NOTES
1. Have you been to the ER, urgent care clinic or hospitalized since your last visit? NO.     2. Have you seen or consulted any other health care providers outside of the [facility] since your last visit (Include any pap smears or colon screening)? NO      Do you have an Advanced Directive? NO    Would you like information on Advanced Directives?  NO    Chief Complaint   Patient presents with    Physical

## 2018-06-26 NOTE — MR AVS SNAPSHOT
[address] [facility] [address] 
 
 
 [address], [facility] [address] [facility] 
[phone] Patient: Felicitas Roman MRN: [id number]/[id number] Visit Information Date & Time Provider Department Dept. Phone Encounter #  
 6/26/2018  8:20 Camilo Alaniz MD [facility] [address] [place] [address] [phone] [id number] Your Appointments 6/26/2018 10:25 AM  
LAB with [facility] NURSE VISIT [facility] [address] [place] [address] ([facility]) Appt Note: lab  
 [address], [place] [address] [place] [address] [place]  
  
   
 [address] [place]  
  
    
 3/29/2019  9:20 AM  
Follow Up with Surendra Abarca MD  
[facility] ([address] [place] [address]) Appt Note: 1 year follow up [place] [phone]  
[phone] [address] [place] [address]  
  
    
 6/27/2019  8:20 AM  
PHYSICAL with Fabby Mejia MD  
[facility] [address] [place] [address]) Appt Note: rpe  
 [address] [facility], [facility] [place] [address] [place]  
  
   
 [address] [place] Upcoming Health Maintenance Date Due Influenza Age 5 to Adult 8/1/2018 COLONOSCOPY 2/3/2022 DTaP/Tdap/Td series (2 - Td) 2/4/2024 Allergies as of 6/26/2018  Review Complete On: 6/26/2018 By: Rohini Becerril LPN Severity Noted Reaction Type Reactions Amoxicillin (Bulk)    Unknown (comments) Rash around collar area Codeine  01/04/2010    Other (comments) Cymbalta [Duloxetine]  09/10/2015    Other (comments) Gi upset Erythromycin    Diarrhea Lipitor [Atorvastatin]  09/24/2012    Myalgia Pcn [Penicillins]  01/04/2010    Rash Pravastatin  01/17/2013    Myalgia Septra [Sulfamethoprim Ds]  07/18/2011    Unknown (comments) Simvastatin  09/10/2015    Myalgia Current Immunizations  Reviewed on 6/19/2017 Name Date H1N1 FLU VACCINE 2/19/2010 Influenza Vaccine 10/1/2016, 10/1/2015, 10/1/2014, 9/1/2013 Influenza Vaccine Nasal 9/1/2011 Influenza Vaccine Whole 1/1/2009 TD Vaccine 10/1/2009 Tdap 2/4/2014 Zoster 10/26/2011 Not reviewed this visit You Were Diagnosed With   
  
 Codes Comments Physical exam    -  Primary ICD-10-CM: Z00.00 ICD-9-CM: V70.9 Anxiety     ICD-10-CM: F41.9 ICD-9-CM: 300.00 Vitals BP Pulse Temp Resp Height(growth percentile) Weight(growth percentile) 108/78 72 99.1 °F (37.3 °C) (Oral) 14 6' 0.01\" (1.829 m) 251 lb (113.9 kg) SpO2 BMI Smoking Status 98% 34.03 kg/m2 Never Smoker Vitals History BMI and BSA Data Body Mass Index Body Surface Area 34.03 kg/m 2 2.41 m 2 Preferred Pharmacy Pharmacy Name Phone Velma Johnson [address] [place] [address], [address] [place] [phone] Your Updated Medication List  
  
   
This list is accurate as of 6/26/18  9:29 AM.  Always use your most recent med list.  
  
  
  
  
 aspirin delayed-release 81 mg tablet Take 81 mg by mouth daily. CRESTOR 20 mg tablet Generic drug:  rosuvastatin TAKE ONE TABLET BY MOUTH ONCE DAILY (NIGHTLY) * diazePAM 10 mg tablet Commonly known as:  VALIUM Take 1 Tab by mouth every twelve (12) hours as needed for Anxiety. Max Daily Amount: 20 mg.  
  
 * diazePAM 5 mg tablet Commonly known as:  VALIUM  
TAKE 1 TABLET BY MOUTH EVERY 6 HOURS AS NEEDED  
  
 fluticasone 50 mcg/actuation nasal spray Commonly known as:  FLONASE  
USE TWO SPRAY(S) IN EACH NOSTRIL ONCE DAILY  
  
 furosemide 40 mg tablet Commonly known as:  LASIX TAKE ONE TABLET BY MOUTH ONCE DAILY  
  
 * KLOR-CON M10 10 mEq tablet Generic drug:  potassium chloride TAKE ONE TABLET BY MOUTH TWICE DAILY  
  
 * KLOR-CON M10 10 mEq tablet Generic drug:  potassium chloride TAKE ONE TABLET BY MOUTH TWICE DAILY  
  
 levothyroxine 100 mcg tablet Commonly known as:  SYNTHROID  
TAKE ONE TABLET BY MOUTH ONCE DAILY BEFORE  BREAKFAST  
  
 lisinopril 2.5 mg tablet Commonly known as:  Sergey Pringle Take  by mouth daily. Indications: hypertension PriLOSEC 20 mg capsule Generic drug:  omeprazole Take 20 mg by mouth daily. VITAMIN D2 50,000 unit capsule Generic drug:  ergocalciferol TAKE ONE CAPSULE BY MOUTH TWICE A WEEK * Notice: This list has 4 medication(s) that are the same as other medications prescribed for you. Read the directions carefully, and ask your doctor or other care provider to review them with you. We Performed the Following CBC WITH AUTOMATED DIFF [46722 CPT(R)] HEMOGLOBIN A1C W/O EAG [36609 CPT(R)] LIPID PANEL [26357 CPT(R)] METABOLIC PANEL, COMPREHENSIVE [85898 CPT(R)] PSA, DIAGNOSTIC (PROSTATE SPECIFIC AG) [id number] CPT(R)] T4, FREE [id number] CPT(R)] TSH 3RD GENERATION [28200 CPT(R)] URINALYSIS W/ RFLX MICROSCOPIC [29889 CPT(R)] To-Do List   
 06/26/2018 Lab:  CBC WITH AUTOMATED DIFF   
  
 06/26/2018 Lab:  HEMOGLOBIN A1C W/O EAG   
  
 06/26/2018 Lab:  LIPID PANEL   
  
 06/26/2018 Lab:  METABOLIC PANEL, COMPREHENSIVE   
  
 06/26/2018 Lab:  PSA, DIAGNOSTIC (PROSTATE SPECIFIC AG)   
  
 06/26/2018 Lab:  T4, FREE   
  
 06/26/2018 Lab:  TSH 3RD GENERATION   
  
 06/26/2018 Lab:  URINALYSIS W/ RFLX MICROSCOPIC \[facility] [place] [facility]\"" & [facility] SERVICES! Dear Holly Drummond: 
Thank you for requesting a [employer] account. Our records indicate that you already have an active [employer] account. You can access your account anytime at [email or web address]. [email or web address]ing Did you know that you can access your hospital and ER discharge instructions at any time in [employer]? You can also review all of your test results from your hospital stay or ER visit. Additional Information If you have questions, please visit the Frequently Asked Questions section of the [email or web address] website at [email or web address] Remember, [employer] is NOT to be used for urgent needs. For medical emergencies, dial 911. Now available from your iPhone and Android! Please provide this summary of care documentation to your next provider. Your primary care clinician is listed as Rich Mendoza. If you have any questions after today's visit, please call [phone].

## 2018-06-27 ENCOUNTER — TELEPHONE (OUTPATIENT)
Dept: INTERNAL MEDICINE CLINIC | Age: 58
End: 2018-06-27

## 2018-06-27 LAB
A-G RATIO,AGRAT: 1.6 RATIO (ref 1.1–2.6)
ABSOLUTE LYMPHOCYTE COUNT, 10803: 1 K/UL (ref 1–4.8)
ALBUMIN SERPL-MCNC: 4.5 G/DL (ref 3.5–5)
ALP SERPL-CCNC: 73 U/L (ref 25–115)
ALT SERPL-CCNC: 29 U/L (ref 5–40)
ANION GAP SERPL CALC-SCNC: 17 MMOL/L
AST SERPL W P-5'-P-CCNC: 17 U/L (ref 10–37)
AVG GLU, 10930: 117 MG/DL (ref 91–123)
BASOPHILS # BLD: 0 K/UL (ref 0–0.2)
BASOPHILS NFR BLD: 0 % (ref 0–2)
BILIRUB SERPL-MCNC: 0.3 MG/DL (ref 0.2–1.2)
BILIRUB UR QL: NEGATIVE
BUN SERPL-MCNC: 17 MG/DL (ref 6–22)
CALCIUM SERPL-MCNC: 9.3 MG/DL (ref 8.4–10.4)
CHLORIDE SERPL-SCNC: 101 MMOL/L (ref 98–110)
CHOLEST SERPL-MCNC: 132 MG/DL (ref 110–200)
CO2 SERPL-SCNC: 25 MMOL/L (ref 20–32)
CREAT SERPL-MCNC: 0.7 MG/DL (ref 0.5–1.2)
EOSINOPHIL # BLD: 0.1 K/UL (ref 0–0.5)
EOSINOPHIL NFR BLD: 3 % (ref 0–6)
ERYTHROCYTE [DISTWIDTH] IN BLOOD BY AUTOMATED COUNT: 12.8 % (ref 10–15.5)
GFRAA, 66117: >60
GFRNA, 66118: >60
GLOBULIN,GLOB: 2.8 G/DL (ref 2–4)
GLUCOSE SERPL-MCNC: 92 MG/DL (ref 70–99)
GLUCOSE UR QL: NEGATIVE MG/DL
GRANULOCYTES,GRANS: 53 % (ref 40–75)
HBA1C MFR BLD HPLC: 5.7 % (ref 4.8–5.9)
HCT VFR BLD AUTO: 44.4 % (ref 39.3–51.6)
HDLC SERPL-MCNC: 2.9 MG/DL (ref 0–5)
HDLC SERPL-MCNC: 46 MG/DL (ref 40–59)
HGB BLD-MCNC: 14 G/DL (ref 13.1–17.2)
HGB UR QL STRIP: NEGATIVE
KETONES UR QL STRIP.AUTO: NEGATIVE MG/DL
LDLC SERPL CALC-MCNC: 70 MG/DL (ref 50–99)
LEUKOCYTE ESTERASE: NEGATIVE
LYMPHOCYTES, LYMLT: 29 % (ref 20–45)
MCH RBC QN AUTO: 30 PG (ref 26–34)
MCHC RBC AUTO-ENTMCNC: 32 G/DL (ref 31–36)
MCV RBC AUTO: 95 FL (ref 80–95)
MONOCYTES # BLD: 0.5 K/UL (ref 0.1–1)
MONOCYTES NFR BLD: 14 % (ref 3–12)
NEUTROPHILS # BLD AUTO: 1.8 K/UL (ref 1.8–7.7)
NITRITE UR QL STRIP.AUTO: NEGATIVE
PH UR STRIP: 7 PH (ref 5–8)
PLATELET # BLD AUTO: 237 K/UL (ref 140–440)
PMV BLD AUTO: 11.5 FL (ref 9–13)
POTASSIUM SERPL-SCNC: 4.4 MMOL/L (ref 3.5–5.5)
PROT SERPL-MCNC: 7.3 G/DL (ref 6.4–8.3)
PROT UR QL STRIP: NEGATIVE MG/DL
PSA SERPL-MCNC: 1.23 NG/ML
RBC # BLD AUTO: 4.7 M/UL (ref 3.8–5.8)
SODIUM SERPL-SCNC: 143 MMOL/L (ref 133–145)
SP GR UR: 1.02 (ref 1–1.03)
T4 FREE SERPL-MCNC: 1.1 NG/DL (ref 0.9–1.8)
TRIGL SERPL-MCNC: 78 MG/DL (ref 40–149)
TSH SERPL DL<=0.005 MIU/L-ACNC: 1.92 MCU/ML (ref 0.27–4.2)
UROBILINOGEN UR STRIP-MCNC: <2 MG/DL
VLDLC SERPL CALC-MCNC: 16 MG/DL (ref 8–30)
WBC # BLD AUTO: 3.4 K/UL (ref 4–11)

## 2018-06-27 RX ORDER — DIAZEPAM 10 MG/1
10 TABLET ORAL
Qty: 30 TAB | Refills: 0 | OUTPATIENT
Start: 2018-06-27 | End: 2018-07-13 | Stop reason: SDUPTHER

## 2018-06-27 RX ORDER — CEPHALEXIN 500 MG/1
500 CAPSULE ORAL 4 TIMES DAILY
Qty: 40 CAP | Refills: 0 | Status: SHIPPED | OUTPATIENT
Start: 2018-06-27 | End: 2018-07-07

## 2018-06-27 NOTE — TELEPHONE ENCOUNTER
Wife calling said pt was to get an oral antibiotic yesterday for bug bite[facility]

## 2018-06-27 NOTE — PROGRESS NOTES
59 y.o. WHITE OR  male who presents for RPE    He's trying hard with the diet as he and his wife have become determined to lose weight     Vitals 6/26/2018 3/29/2018 3/22/2018 9/26/2017 8/22/2017   Weight 251 lb 240 lb 249 lb 250 lb 3.2 oz 242 lb      The sleep apnea is controlled and he wears the mask religiously     Remains active and no cardiovascular complaints. He had seen Dr Dottie Cortez late March for abn rhythm strip, determined to be benign idioventricular rhythm.     No gi or gu issues. otc omeprazole controls the sx      He came back from [place] a week or so ago and appears to have an insect bite that's at least inflamed.   No systemic sx, no purulent drainage but redness present without streaking    IF 6/18    Past Medical History:   Diagnosis Date    Anxiety     LOPEZ-7 was 11/21 from 7/11    Arthritis     djd on mri c spine Dr. Arden Santos 3/13; knees bilat 2017    Colon polyps 2008    Dr. Gilma Wong CVA (cerebral vascular accident[facility]) 2006    Dr. Fredy Ambrose s/p PFO closure, carotids negative    Depression     PHQ-9 was 7/27 from (7/11); 10/27 from (5/15)    Diverticula of colon     Dyslipidemia     intol lipitor, prava, zocor; tolerated mevacor    FHx: heart disease     GERD (gastroesophageal reflux disease)     Hearing loss 2016    right sided; s/p myringotomy Dr Omi Iqbal; now seeing diff ENT    Hemorrhoid     Hypogonadism male 2011    Dr Marty Vizcaino; dc'ed replacement tx 2015    Hypothyroidism     Nephrolithiasis     Obesity (BMI 30-39.9)     peak weight 245 lbs, bmi 34.7 from 5/15; dec julia supp, med supervised wt loss, bariatrics; IF 6/18 start weight 251    Peripheral neuropathy 2/13    on EMG negative serologies    PFO (patent foramen ovale) s/p closure Dr. Kaye Mitchell 2006     Prediabetes     Primary hypertension 3/22/2018    Seasonal allergies     Sleep apnea 2006    Dr. Sanaz Elliott White coat hypertension      Past Surgical History:   Procedure Laterality Date    CARDIAC SURG PROCEDURE UNLIST  5/16    ETT negative    CARDIAC SURG PROCEDURE UNLIST  01/2018    echo nl lv, ef 60%, mild dd, mild/mod lvh, tr AR, aortic root 4cm, well seated pfo closure device Dr Mathew Iqbal 2008 polyp; Dr Alexi Reddy 2/3/12 neg    HX HERNIA REPAIR      HX KNEE ARTHROSCOPY      HX ORTHOPAEDIC      knee surgery    HX TONSILLECTOMY       Social History     Social History    Marital status:      Spouse name: N/A    Number of children: 2    Years of education: N/A     Occupational History    auto parts worker      Social History Main Topics    Smoking status: Never Smoker    Smokeless tobacco: Never Used    Alcohol use No    Drug use: No    Sexual activity: Yes     Partners: Female     Other Topics Concern    Not on file     Social History Narrative     Allergies   Allergen Reactions    Amoxicillin (Bulk) Unknown (comments)     Rash around collar area    Codeine Other (comments)    Cymbalta [Duloxetine] Other (comments)     Gi upset    Erythromycin Diarrhea    Lipitor [Atorvastatin] Myalgia    Pcn [Penicillins] Rash    Pravastatin Myalgia    Septra [Sulfamethoprim Ds] Unknown (comments)    Simvastatin Myalgia     Current Outpatient Prescriptions   Medication Sig    diazePAM (VALIUM) 10 mg tablet Take 1 Tab by mouth every twelve (12) hours as needed for Anxiety. Max Daily Amount: 20 mg.    cephALEXin (KEFLEX) 500 mg capsule Take 1 Cap by mouth four (4) times daily for 10 days.  fluticasone (FLONASE) 50 mcg/actuation nasal spray USE TWO SPRAY(S) IN EACH NOSTRIL ONCE DAILY    KLOR-CON M10 10 mEq tablet TAKE ONE TABLET BY MOUTH TWICE DAILY    lisinopril (PRINIVIL, ZESTRIL) 2.5 mg tablet Take  by mouth daily.  Indications: hypertension    VITAMIN D2 50,000 unit capsule TAKE ONE CAPSULE BY MOUTH TWICE A WEEK    furosemide (LASIX) 40 mg tablet TAKE ONE TABLET BY MOUTH ONCE DAILY    CRESTOR 20 mg tablet TAKE ONE TABLET BY MOUTH ONCE DAILY (NIGHTLY)    levothyroxine (SYNTHROID) 100 mcg tablet TAKE ONE TABLET BY MOUTH ONCE DAILY BEFORE  BREAKFAST    KLOR-CON M10 10 mEq tablet TAKE ONE TABLET BY MOUTH TWICE DAILY    omeprazole (PRILOSEC) 20 mg capsule Take 20 mg by mouth daily.  aspirin delayed-release 81 mg tablet Take 81 mg by mouth daily.  diazePAM (VALIUM) 5 mg tablet TAKE 1 TABLET BY MOUTH EVERY 6 HOURS AS NEEDED     No current facility-administered medications for this visit. REVIEW OF SYSTEMS: colo 2/12 Dr Eladia Mays  Ophtho  no vision change or eye pain  Oral  no mouth pain, tongue or tooth problems  Ears  no hearing loss, ear pain, fullness, no swallowing problems  Cardiac  no CP, PND, orthopnea, edema, palpitations or syncope  Chest  no breast masses  Resp  no wheezing, chronic coughing, dyspnea  GI  no heartburn, nausea, vomiting, change in bowel habits, bleeding, hemorrhoids  Urinary  no dysuria, hematuria, flank pain, urgency, frequency  Neuro  no focal weakness, numbness, paresthesias, incoordination, ataxia, involuntary movements  Endo - no polyuria, polydipsia, nocturia, hot flashes    Visit Vitals    /78    Pulse 72    Temp 99.1 °F (37.3 °C) (Oral)    Resp 14    Ht 6' 0.01\" (1.829 m)    Wt 251 lb (113.9 kg)    SpO2 98%    BMI 34.03 kg/m2   A&O x3  Affect is appropriate. Mood stable  No apparent distress  Anicteric, no JVD, adenopathy or thyromegaly. No carotid bruits or radiated murmur  Lungs clear to auscultation, no wheezes or rales  Heart showed regular rate and rhythm. No  rubs, gallops. 2/6 jodie lsb without rad  Abdomen soft nontender, no hepatosplenomegaly or masses. Extremities without edema. Pulses 1-2+ symmetrically  Left lateral distal calf shows isolated area where the insect bite is w granulation tissue.   Surrounding erythema, no fluctuance or drainage    LABS  From 2/10 showed   gluc 93,   cr 0.80,      alt 33,               chol 188, tg 74, hdl 45, ldl-c 128,             wbc 4.3, hb 14.3, plt 197, psa 0.70, ua neg  From 10/11 showed gluc 102, cr 0.89, gfr 101, alt 25,           ldl-p 1499,             tsh 5.03, wbc 3.7, hb 13.4, plt 231  From 12/11 showed                     tsh 2.24,          ft4 1.02,  tpo ab+  From 4/12 showed                     tsh 4.18  From 9/12 showed   gluc 106, cr 0.70, gfr>60,  alt 34,           ldl-p 1839, chol 191, tg 76, hdl 42, ldl-c 134  From 1/13 showed   gluc 100,       hba1c 6.0,      chol 174, tg 52, hdl 35, ldl-c 127,                        vit d 22.3  From 4/13 showed         hba1c 5.8,                         b12 499,  fol 12.5, spep neg, guera neg  From 1/14 showed   gluc 97,   cr 0.90, gfr>60,  alt 26, hba1c 5.8,              chol 190, tg 85, hdl 44, ldl-c 129, tsh 3.72, wbc 4.9, hb 13.4, plt 262, vit d 29.2  From 5/15 showed   gluc 100, cr 0.91, gfr 94,   alt 24,       chol 177, tg 79, hdl 44, ldl-c 117, tsh 4.35, wbc 5.5, hb 13.1, plt 240  From 5/15 showed                     tsh 4.34,           b12 393,  fol 11.3  From 9/15 showed            chol 183, tg 96, hdl 47, ldl-c 117, tsh 3.00,          vit d 29.0  From 5/16 showed   gluc 99,   cr 0.90, gfr>60, alt 21, hba1c 6.0,               tsh 4.73, wbc 4.4, hb 13.3, plt 222  From 1/17 showed   gluc 104, cr 0.70, gfr>60, alt 25,       chol 157, tg 71, hdl 46, ldl-c 97,             wbc 3.6, hb 13.2, plt 240, vit d 24.2, tsh 2.41, psa 1.24  From 6/17 showed   gluc 104, cr 0.80, gfr>60, alt 20, hba1c 6.0,     chol 105, tg 34, hdl 59, ldl-c 40,          vit d 37.0  From 12/17 showed                            RA neg, CCP neg, crp 0.1    Results for orders placed or performed in visit on 06/26/18   CBC WITH AUTOMATED DIFF   Result Value Ref Range    WBC 3.4 (L) 4.0 - 11.0 K/uL    RBC 4.70 3.80 - 5.80 M/uL    HGB 14.0 13.1 - 17.2 g/dL    HCT 44.4 39.3 - 51.6 %    MCV 95 80 - 95 fL    MCH 30 26 - 34 pg    MCHC 32 31 - 36 g/dL    RDW 12.8 10.0 - 15.5 %    PLATELET 133 890 - 941 K/uL    MPV 11.5 9.0 - 13.0 fL NEUTROPHILS 53 40 - 75 %    Lymphocytes 29 20 - 45 %    MONOCYTES 14 (H) 3 - 12 %    EOSINOPHILS 3 0 - 6 %    BASOPHILS 0 0 - 2 %    ABS. NEUTROPHILS 1.8 1.8 - 7.7 K/uL    ABSOLUTE LYMPHOCYTE COUNT 1.0 1.0 - 4.8 K/uL    ABS. MONOCYTES 0.5 0.1 - 1.0 K/uL    ABS. EOSINOPHILS 0.1 0.0 - 0.5 K/uL    ABS. BASOPHILS 0.0 0.0 - 0.2 K/uL   METABOLIC PANEL, COMPREHENSIVE   Result Value Ref Range    Glucose 92 70 - 99 mg/dL    BUN 17 6 - 22 mg/dL    Creatinine 0.7 0.5 - 1.2 mg/dL    Sodium 143 133 - 145 mmol/L    Potassium 4.4 3.5 - 5.5 mmol/L    Chloride 101 98 - 110 mmol/L    CO2 25 20 - 32 mmol/L    AST (SGOT) 17 10 - 37 U/L    ALT (SGPT) 29 5 - 40 U/L    Alk.  phosphatase 73 25 - 115 U/L    Bilirubin, total 0.3 0.2 - 1.2 mg/dL    Calcium 9.3 8.4 - 10.4 mg/dL    Protein, total 7.3 6.4 - 8.3 g/dL    Albumin 4.5 3.5 - 5.0 g/dL    A-G Ratio 1.6 1.1 - 2.6 ratio    Globulin 2.8 2.0 - 4.0 g/dL    Anion gap 17.0 mmol/L    GFRAA >60.0 >60.0    GFRNA >60.0 >60.0   LIPID PANEL   Result Value Ref Range    Triglyceride 78 40 - 149 mg/dL    HDL Cholesterol 46 40 - 59 mg/dL    Cholesterol, total 132 110 - 200 mg/dL    CHOLESTEROL/HDL 2.9 0.0 - 5.0    LDL, calculated 70 50 - 99 mg/dL    VLDL, calculated 16 8 - 30 mg/dL   HEMOGLOBIN A1C W/O EAG   Result Value Ref Range    Hemoglobin A1c 5.7 4.8 - 5.9 %    AVG  91 - 123 mg/dL   TSH 3RD GENERATION   Result Value Ref Range    TSH 1.92 0.27 - 4.20 mcU/mL   T4, FREE   Result Value Ref Range    T4, Free 1.1 0.9 - 1.8 ng/dL   URINALYSIS W/ RFLX MICROSCOPIC   Result Value Ref Range    Specific Gravity 1.019 1.005 - 1.03    pH (UA) 7.0 5.0 - 8.0 pH    Protein Negative Negative, mg/dL    Glucose Negative Negative mg/dL    Ketone Negative Negative mg/dL    Bilirubin Negative Negative    Blood Negative Negative    Nitrites Negative Negative    Leukocyte Esterase Negative Negative    Urobilinogen <2.0 <2.0 mg/dL   PSA, DIAGNOSTIC (PROSTATE SPECIFIC AG)   Result Value Ref Range    Prostate Specific Ag 1.230 <=3.100 ng/mL     Patient Active Problem List   Diagnosis Code    CVA Dr. Jayshree Lynch 2006 I63.9    Sleep Apnea Dr. Yovany Nazario 2006 G47.30    Anxiety and depression 2011 F41.9    Colon polyps and diverticulosis Dr. Neema Steel 2008 K63.5    Hypogonadism Dr. Steven London 2011 E29.1    Dyslipidemia E78.5    Hypovitaminosis D E55.9    Neuropathy G62.9    Hypothyroidism due to acquired atrophy of thyroid E03.4    Gastroesophageal reflux disease without esophagitis K21.9    Obesity (BMI 30-39. 9) E66.9    Prediabetes R73.03    Primary osteoarthritis involving multiple joints Dr Flores Davis M15.0    Primary hypertension I10    S/P percutaneous patent foramen ovale closure Z87.74     Assessment and plan:  1. HLP. Continue current regimen and doing well  2. Hypovit d. Check labs next draw  3. Hypothyroidism. Therapeutic dosing at last check  4. Hypogonadism. F/U Dr. Steven London as needed, he is off replacement. 5. GERD. PPI and avoidance measures  6. Prediabetes. Lifestyle and dietary measures, wt loss would be ideal  7. Neuropathy. Declined meds   8. Polyps and diverticulosis. Fiber, f/u Dr Neema Steel 2018  9. Obesity. Intermittent fasting discussed at length. Explained the concepts in detail. Went over possible physiologic changes that could occur and how that would possibly impact his situation in a positive way. Discussed 16:8 program in particular. We also went over the differences between hunger and esther hypoglycemia. Look up low insulin index foods. He will do some more research and consider implementing in the near future, standard handout given        RTC 12/18      Above conditions discussed at length and patient vocalized understanding.   All questions answered to patient satisfaction

## 2018-07-13 ENCOUNTER — TELEPHONE (OUTPATIENT)
Dept: INTERNAL MEDICINE CLINIC | Age: 58
End: 2018-07-13

## 2018-07-13 DIAGNOSIS — F41.9 ANXIETY: ICD-10-CM

## 2018-07-13 RX ORDER — DIAZEPAM 10 MG/1
TABLET ORAL
Qty: 30 TAB | Refills: 0 | Status: SHIPPED | OUTPATIENT
Start: 2018-07-13 | End: 2018-08-10 | Stop reason: SDUPTHER

## 2018-08-10 ENCOUNTER — TELEPHONE (OUTPATIENT)
Dept: INTERNAL MEDICINE CLINIC | Age: 58
End: 2018-08-10

## 2018-08-10 DIAGNOSIS — F41.9 ANXIETY: ICD-10-CM

## 2018-08-10 RX ORDER — DIAZEPAM 10 MG/1
TABLET ORAL
Qty: 30 TAB | Refills: 0 | Status: SHIPPED | OUTPATIENT
Start: 2018-08-10 | End: 2018-09-09 | Stop reason: SDUPTHER

## 2018-09-01 DIAGNOSIS — E03.4 HYPOTHYROIDISM DUE TO ACQUIRED ATROPHY OF THYROID: ICD-10-CM

## 2018-09-04 RX ORDER — LEVOTHYROXINE SODIUM 100 UG/1
TABLET ORAL
Qty: 90 TAB | Refills: 3 | Status: SHIPPED | OUTPATIENT
Start: 2018-09-04 | End: 2019-10-23 | Stop reason: SDUPTHER

## 2018-09-09 DIAGNOSIS — F41.9 ANXIETY: ICD-10-CM

## 2018-09-10 ENCOUNTER — TELEPHONE (OUTPATIENT)
Dept: INTERNAL MEDICINE CLINIC | Age: 58
End: 2018-09-10

## 2018-09-10 RX ORDER — DIAZEPAM 10 MG/1
TABLET ORAL
Qty: 30 TAB | Refills: 0 | Status: SHIPPED | OUTPATIENT
Start: 2018-09-10 | End: 2018-10-05 | Stop reason: SDUPTHER

## 2018-10-05 ENCOUNTER — TELEPHONE (OUTPATIENT)
Dept: INTERNAL MEDICINE CLINIC | Age: 58
End: 2018-10-05

## 2018-10-05 DIAGNOSIS — F41.9 ANXIETY: ICD-10-CM

## 2018-10-05 RX ORDER — DIAZEPAM 10 MG/1
TABLET ORAL
Qty: 30 TAB | Refills: 0 | Status: SHIPPED | OUTPATIENT
Start: 2018-10-05 | End: 2018-11-08 | Stop reason: SDUPTHER

## 2018-11-08 ENCOUNTER — TELEPHONE (OUTPATIENT)
Dept: INTERNAL MEDICINE CLINIC | Age: 58
End: 2018-11-08

## 2018-11-08 DIAGNOSIS — F41.9 ANXIETY: ICD-10-CM

## 2018-11-08 RX ORDER — DIAZEPAM 10 MG/1
TABLET ORAL
Qty: 30 TAB | Refills: 0 | Status: SHIPPED | OUTPATIENT
Start: 2018-11-08 | End: 2018-12-10 | Stop reason: SDUPTHER

## 2018-12-10 ENCOUNTER — TELEPHONE (OUTPATIENT)
Dept: INTERNAL MEDICINE CLINIC | Age: 58
End: 2018-12-10

## 2018-12-10 DIAGNOSIS — F41.9 ANXIETY: ICD-10-CM

## 2018-12-10 RX ORDER — DIAZEPAM 10 MG/1
TABLET ORAL
Qty: 30 TAB | Refills: 0 | Status: SHIPPED | OUTPATIENT
Start: 2018-12-10 | End: 2019-01-10 | Stop reason: SDUPTHER

## 2019-01-10 DIAGNOSIS — F41.9 ANXIETY: ICD-10-CM

## 2019-01-10 RX ORDER — DIAZEPAM 10 MG/1
TABLET ORAL
Qty: 30 TAB | Refills: 0 | Status: SHIPPED | OUTPATIENT
Start: 2019-01-10 | End: 2019-02-08 | Stop reason: SDUPTHER

## 2019-01-23 DIAGNOSIS — E55.9 HYPOVITAMINOSIS D: ICD-10-CM

## 2019-01-23 RX ORDER — ERGOCALCIFEROL 1.25 MG/1
CAPSULE ORAL
Qty: 8 CAP | Refills: 12 | Status: SHIPPED | OUTPATIENT
Start: 2019-01-23 | End: 2020-04-27

## 2019-02-08 DIAGNOSIS — F41.9 ANXIETY: ICD-10-CM

## 2019-02-08 RX ORDER — DIAZEPAM 10 MG/1
TABLET ORAL
Qty: 30 TAB | Refills: 0 | Status: SHIPPED | OUTPATIENT
Start: 2019-02-08 | End: 2019-03-01 | Stop reason: SDUPTHER

## 2019-02-08 NOTE — TELEPHONE ENCOUNTER
PCP: Risa Tinsley MD      reviewed, last fill 1-10-19 qty 15 days. Last appt: 6/26/2018  Future Appointments   Date Time Provider Jake Heck   3/29/2019  9:20 AM Sara Dugan MD [address] [place] [address]   6/20/2019  7:55 AM [facility] NURSE VISIT [facility] PINA MORALES   6/27/2019  8:20 AM Madison Kathy Suazo MD [facility] PINA SCHED       Requested Prescriptions     Pending Prescriptions Disp Refills    diazePAM (VALIUM) 10 mg tablet [Pharmacy Med Name: DIAZEPAM 10MG       TAB] 30 Tab 0     Sig: TAKE 1 TABLET BY MOUTH EVERY 12 HOURS AS NEEDED FOR ANXIETY .  DO NOT EXCEED 2 PER 24 HOURS

## 2019-02-11 ENCOUNTER — TELEPHONE (OUTPATIENT)
Dept: INTERNAL MEDICINE CLINIC | Age: 59
End: 2019-02-11

## 2019-03-01 ENCOUNTER — TELEPHONE (OUTPATIENT)
Dept: INTERNAL MEDICINE CLINIC | Age: 59
End: 2019-03-01

## 2019-03-01 DIAGNOSIS — F41.9 ANXIETY: ICD-10-CM

## 2019-03-01 RX ORDER — DIAZEPAM 10 MG/1
TABLET ORAL
Qty: 30 TAB | Refills: 0 | Status: SHIPPED | OUTPATIENT
Start: 2019-03-01 | End: 2019-03-07 | Stop reason: SDUPTHER

## 2019-03-05 DIAGNOSIS — F41.9 ANXIETY: ICD-10-CM

## 2019-03-06 RX ORDER — DIAZEPAM 10 MG/1
TABLET ORAL
Qty: 30 TAB | Refills: 0 | OUTPATIENT
Start: 2019-03-06

## 2019-03-07 RX ORDER — DIAZEPAM 10 MG/1
TABLET ORAL
Qty: 30 TAB | Refills: 0 | OUTPATIENT
Start: 2019-03-07 | End: 2019-04-09 | Stop reason: SDUPTHER

## 2019-03-07 NOTE — TELEPHONE ENCOUNTER
According to  last  for diazepam was 2/11/19. Spoke with pharmacist medication was never called in. Called Valium into [address] Spoke with patients wife Berto López and made aware.

## 2019-03-07 NOTE — TELEPHONE ENCOUNTER
nesha mars, says [facility] does not have refill for diazepam. Please check on this and call her with update. [phone]

## 2019-04-11 ENCOUNTER — TELEPHONE (OUTPATIENT)
Dept: INTERNAL MEDICINE CLINIC | Age: 59
End: 2019-04-11

## 2019-04-16 ENCOUNTER — OFFICE VISIT (OUTPATIENT)
Dept: INTERNAL MEDICINE CLINIC | Age: 59
End: 2019-04-16

## 2019-04-16 VITALS
TEMPERATURE: 98.4 F | OXYGEN SATURATION: 99 % | BODY MASS INDEX: 28.58 KG/M2 | WEIGHT: 211 LBS | DIASTOLIC BLOOD PRESSURE: 71 MMHG | HEIGHT: 72 IN | HEART RATE: 76 BPM | SYSTOLIC BLOOD PRESSURE: 129 MMHG | RESPIRATION RATE: 14 BRPM

## 2019-04-16 DIAGNOSIS — R10.9 ABDOMINAL PAIN, UNSPECIFIED ABDOMINAL LOCATION: ICD-10-CM

## 2019-04-16 DIAGNOSIS — R10.9 ABDOMINAL PAIN, UNSPECIFIED ABDOMINAL LOCATION: Primary | ICD-10-CM

## 2019-04-16 DIAGNOSIS — R10.11 RUQ ABDOMINAL PAIN: ICD-10-CM

## 2019-04-16 NOTE — PROGRESS NOTES
59 y.o. WHITE OR  male who presents for evaluation. At the last visit in June, we discussed IF but he did not do that and instead, he started keto then transitioned to low carb and he lost approx 40 lbs! Vitals 4/16/2019 6/26/2018 3/29/2018 3/22/2018 Weight 211 lb 251 lb 240 lb 249 lb The last 2 weeks, he's been having ruq pain. No associated n/v/d, urinary sx or ureteral colic and not at all similar to his prior kidney stones. Not triggered by eating, more so when he's lying down on his left side. Bowel movements are unchanged, he goes every 2-3 days, certainly no bleeding and last colo 2012. No injury, bruising in the area Past Medical History:  
Diagnosis Date  Anxiety LOPEZ-7 was 11/21 from 7/11  Arthritis   
 djd on mri c spine Dr. Esdras Monroe 3/13; knees bilat 2017  Colon polyps 2008 Dr. Rosas Bound  CVA (cerebral vascular accident) ([facility] [address] 2006 Dr. Kali Astudillo s/p PFO closure, carotids negative  Depression PHQ-9 was 7/27 from (7/11); 10/27 from (5/15)  Diverticula of colon  Dyslipidemia   
 intol lipitor, prava, zocor; tolerated mevacor  FHx: heart disease  GERD (gastroesophageal reflux disease)  Hearing loss 2016  
 right sided; s/p myringotomy Dr Erin Black; now seeing diff ENT  Hemorrhoid  Hypogonadism male 2011 Dr Toño Napoles; dc'ed replacement tx 2015  Hypothyroidism  Nephrolithiasis  Obesity (BMI 30-39.9)   
 peak weight 245 lbs, bmi 34.7 from 5/15; dec julia supp, med sup wt loss, bariatrics; IF 6/18 start weight 251 but did not do; did keto/low carb 6/18  Peripheral neuropathy 2/13  
 on EMG negative serologies  PFO (patent foramen ovale) s/p closure Dr. Carol Schmid 2006  Prediabetes  Primary hypertension 3/22/2018  Seasonal allergies  Sleep apnea 2006 Dr. Tricia Monge  White coat hypertension Past Surgical History:  
Procedure Laterality Date  CARDIAC SURG PROCEDURE UNLIST  5/16 ETT negative  CARDIAC SURG PROCEDURE UNLIST  01/2018  
 echo nl lv, ef 60%, mild dd, mild/mod lvh, tr AR, aortic root 4cm, well seated pfo closure device Dr Ally Levine HX COLONOSCOPY Dr. Ekaterina Loza 2008 polyp; Dr Shena Dent 2/3/12 neg  HX HERNIA REPAIR    
 HX KNEE ARTHROSCOPY    
 HX ORTHOPAEDIC    
 knee surgery  HX TONSILLECTOMY Social History Socioeconomic History  Marital status:  Spouse name: Not on file  Number of children: 2  
 Years of education: Not on file  Highest education level: Not on file Occupational History  Occupation: auto parts worker Social Needs  Financial resource strain: Not on file  Food insecurity:  
  Worry: Not on file Inability: Not on file  Transportation needs:  
  Medical: Not on file Non-medical: Not on file Tobacco Use  Smoking status: Never Smoker  Smokeless tobacco: Never Used Substance and Sexual Activity  Alcohol use: No  
 Drug use: No  
 Sexual activity: Yes  
  Partners: Female Lifestyle  Physical activity:  
  Days per week: Not on file Minutes per session: Not on file  Stress: Not on file Relationships  Social connections:  
  Talks on phone: Not on file Gets together: Not on file Attends Faith service: Not on file Active member of club or organization: Not on file Attends meetings of clubs or organizations: Not on file Relationship status: Not on file  Intimate partner violence:  
  Fear of current or ex partner: Not on file Emotionally abused: Not on file Physically abused: Not on file Forced sexual activity: Not on file Other Topics Concern  Not on file Social History Narrative  Not on file Current Outpatient Medications Medication Sig  
 diazePAM (VALIUM) 10 mg tablet TAKE 1 TABLET BY MOUTH EVERY 12 HOURS AS NEEDED FOR ANXIETY DO  NOT  EXCEED  2  TABLETS  PER  24  HOUR  
  VITAMIN D2 50,000 unit capsule TAKE ONE CAPSULE BY MOUTH TWICE A WEEK  levothyroxine (SYNTHROID) 100 mcg tablet TAKE ONE TABLET BY MOUTH ONCE DAILY BEFORE BREAKFAST  fluticasone (FLONASE) 50 mcg/actuation nasal spray USE TWO SPRAY(S) IN EACH NOSTRIL ONCE DAILY  lisinopril (PRINIVIL, ZESTRIL) 2.5 mg tablet Take  by mouth daily. Indications: hypertension  CRESTOR 20 mg tablet TAKE ONE TABLET BY MOUTH ONCE DAILY (NIGHTLY)  omeprazole (PRILOSEC) 20 mg capsule Take 20 mg by mouth daily.  aspirin delayed-release 81 mg tablet Take 81 mg by mouth daily.  CRESTOR 20 mg tablet TAKE 1 TABLET BY MOUTH ONCE NIGHTLY  diazePAM (VALIUM) 5 mg tablet TAKE 1 TABLET BY MOUTH EVERY 6 HOURS AS NEEDED  furosemide (LASIX) 40 mg tablet TAKE ONE TABLET BY MOUTH ONCE DAILY  KLOR-CON M10 10 mEq tablet TAKE ONE TABLET BY MOUTH TWICE DAILY No current facility-administered medications for this visit. Allergies Allergen Reactions  Amoxicillin (Bulk) Unknown (comments) Rash around collar area  Codeine Other (comments)  Cymbalta [Duloxetine] Other (comments) Gi upset  Erythromycin Diarrhea  Lipitor [Atorvastatin] Myalgia  Pcn [Penicillins] Rash  Pravastatin Myalgia  Septra [Sulfamethoprim Ds] Unknown (comments)  Simvastatin Myalgia REVIEW OF SYSTEMS:  
Ophtho  no vision change or eye pain Oral  no mouth pain, tongue or tooth problems Ears  no hearing loss, ear pain, fullness, no swallowing problems Cardiac  no CP, PND, orthopnea, edema, palpitations or syncope Chest  no breast masses Resp  no wheezing, chronic coughing, dyspnea GI  no heartburn, nausea, vomiting, change in bowel habits, bleeding, hemorrhoids Urinary  no dysuria, hematuria, flank pain, urgency, frequency Visit Vitals /71 Pulse 76 Temp 98.4 °F (36.9 °C) (Oral) Resp 14 Ht 6' 0.01\" (1.829 m) Wt 211 lb (95.7 kg) SpO2 99% BMI 28.61 kg/m² A&O x3 
 Affect is appropriate. Mood stable No apparent distress Anicteric, no JVD, adenopathy or thyromegaly. No carotid bruits or radiated murmur Lungs clear to auscultation, no wheezes or rales Heart showed regular rate and rhythm. No murmur, rubs, gallops Abdomen soft nontender, no hepatosplenomegaly or masses. Extremities without edema. Pulses 1-2+ symmetrically Assessment and plan: 1. RUQ pain etiology unclear. Check labs and schedule US and go from there Above conditions discussed at length and patient vocalized understanding. All questions answered to patient satisfaction

## 2019-04-16 NOTE — PROGRESS NOTES
Chief Complaint Patient presents with  Abdominal Pain  
  right sided abdominal pain x 2 weeks. no trouble having bm's or injuries 1. Have you been to the ER, urgent care clinic or hospitalized since your last visit? NO.  
 
2. Have you seen or consulted any other health care providers outside of the [address] [place] [address] since your last visit (Include any pap smears or colon screening)?  NO

## 2019-04-17 ENCOUNTER — DOCUMENTATION ONLY (OUTPATIENT)
Dept: INTERNAL MEDICINE CLINIC | Age: 59
End: 2019-04-17

## 2019-04-17 ENCOUNTER — TELEPHONE (OUTPATIENT)
Dept: INTERNAL MEDICINE CLINIC | Age: 59
End: 2019-04-17

## 2019-04-17 LAB
ABSOLUTE LYMPHOCYTE COUNT, 10803: 1.6 K/UL (ref 1–4.8)
BASOPHILS # BLD: 0 K/UL (ref 0–0.2)
BASOPHILS NFR BLD: 0 % (ref 0–2)
EOSINOPHIL # BLD: 0.1 K/UL (ref 0–0.5)
EOSINOPHIL NFR BLD: 2 % (ref 0–6)
ERYTHROCYTE [DISTWIDTH] IN BLOOD BY AUTOMATED COUNT: 13 % (ref 10–15.5)
GRANULOCYTES,GRANS: 59 % (ref 40–75)
HCT VFR BLD AUTO: 44.2 % (ref 39.3–51.6)
HGB BLD-MCNC: 13.9 G/DL (ref 13.1–17.2)
LYMPHOCYTES, LYMLT: 27 % (ref 20–45)
MCH RBC QN AUTO: 30 PG (ref 26–34)
MCHC RBC AUTO-ENTMCNC: 31 G/DL (ref 31–36)
MCV RBC AUTO: 95 FL (ref 80–95)
MONOCYTES # BLD: 0.7 K/UL (ref 0.1–1)
MONOCYTES NFR BLD: 12 % (ref 3–12)
NEUTROPHILS # BLD AUTO: 3.4 K/UL (ref 1.8–7.7)
PLATELET # BLD AUTO: 217 K/UL (ref 140–440)
PMV BLD AUTO: 11.6 FL (ref 9–13)
RBC # BLD AUTO: 4.66 M/UL (ref 3.8–5.8)
WBC # BLD AUTO: 5.8 K/UL (ref 4–11)

## 2019-04-17 NOTE — TELEPHONE ENCOUNTER
Pt was ordered to have a US done, he wants this done at [facility] in [facility] pt is asking how does this get scheduled

## 2019-04-23 ENCOUNTER — TELEPHONE (OUTPATIENT)
Dept: INTERNAL MEDICINE CLINIC | Age: 59
End: 2019-04-23

## 2019-04-23 DIAGNOSIS — R10.9 ABDOMINAL PAIN, UNSPECIFIED ABDOMINAL LOCATION: ICD-10-CM

## 2019-04-23 DIAGNOSIS — R10.11 RUQ PAIN: Primary | ICD-10-CM

## 2019-04-24 ENCOUNTER — TELEPHONE (OUTPATIENT)
Dept: INTERNAL MEDICINE CLINIC | Age: 59
End: 2019-04-24

## 2019-04-24 NOTE — TELEPHONE ENCOUNTER
Contacted [facility] lab about the cmp and lipase. The test were not run due to blood was hemolyzed.        LMTCB to have pt schedule to have labs redrawn at earliest convenience

## 2019-04-24 NOTE — TELEPHONE ENCOUNTER
Pt aware of message below and verbalized understanding. No further questions or concerns from pt at this time. Pt is scheduled for 4/25/19 at 8:45am to have labs redrawn.  Pt wants to wait until results come back from these labs before he decides to go with GI.

## 2019-04-24 NOTE — TELEPHONE ENCOUNTER
ls call    US neg  Cbc neg  Cmp/lipase pending still    pls get results of cmp/lipase    Gi consult pls  sched dr Major alvarado

## 2019-04-25 ENCOUNTER — APPOINTMENT (OUTPATIENT)
Dept: INTERNAL MEDICINE CLINIC | Age: 59
End: 2019-04-25

## 2019-04-26 LAB
A-G RATIO,AGRAT: 2 RATIO (ref 1.1–2.6)
ALBUMIN SERPL-MCNC: 4.5 G/DL (ref 3.5–5)
ALP SERPL-CCNC: 62 U/L (ref 40–125)
ALT SERPL-CCNC: 25 U/L (ref 5–40)
ANION GAP SERPL CALC-SCNC: 18 MMOL/L
AST SERPL W P-5'-P-CCNC: 16 U/L (ref 10–37)
BILIRUB SERPL-MCNC: 0.3 MG/DL (ref 0.2–1.2)
BUN SERPL-MCNC: 19 MG/DL (ref 6–22)
CALCIUM SERPL-MCNC: 9.3 MG/DL (ref 8.4–10.4)
CHLORIDE SERPL-SCNC: 105 MMOL/L (ref 98–110)
CO2 SERPL-SCNC: 23 MMOL/L (ref 20–32)
CREAT SERPL-MCNC: 0.8 MG/DL (ref 0.8–1.6)
GFRAA, 66117: >60
GFRNA, 66118: >60
GLOBULIN,GLOB: 2.3 G/DL (ref 2–4)
GLUCOSE SERPL-MCNC: 99 MG/DL (ref 70–99)
LIPASE SERPL-CCNC: 29 U/L (ref 7–60)
POTASSIUM SERPL-SCNC: 5 MMOL/L (ref 3.5–5.5)
PROT SERPL-MCNC: 6.8 G/DL (ref 6.2–8.1)
SODIUM SERPL-SCNC: 146 MMOL/L (ref 133–145)

## 2019-04-29 DIAGNOSIS — R10.11 RUQ ABDOMINAL PAIN: ICD-10-CM

## 2019-04-29 NOTE — TELEPHONE ENCOUNTER
pls call    Results for orders placed or performed in visit on 04/16/19   CBC WITH AUTOMATED DIFF   Result Value Ref Range    WBC 5.8 4.0 - 11.0 K/uL    RBC 4.66 3.80 - 5.80 M/uL    HGB 13.9 13.1 - 17.2 g/dL    HCT 44.2 39.3 - 51.6 %    MCV 95 80 - 95 fL    MCH 30 26 - 34 pg    MCHC 31 31 - 36 g/dL    RDW 13.0 10.0 - 15.5 %    PLATELET 624 666 - 187 K/uL    MPV 11.6 9.0 - 13.0 fL    NEUTROPHILS 59 40 - 75 %    Lymphocytes 27 20 - 45 %    MONOCYTES 12 3 - 12 %    EOSINOPHILS 2 0 - 6 %    BASOPHILS 0 0 - 2 %    ABS. NEUTROPHILS 3.4 1.8 - 7.7 K/uL    ABSOLUTE LYMPHOCYTE COUNT 1.6 1.0 - 4.8 K/uL    ABS. MONOCYTES 0.7 0.1 - 1.0 K/uL    ABS. EOSINOPHILS 0.1 0.0 - 0.5 K/uL    ABS.  BASOPHILS 0.0 0.0 - 0.2 K/uL     Labs ok  Gi consult if sx [persist

## 2019-06-19 DIAGNOSIS — F41.9 ANXIETY: ICD-10-CM

## 2019-06-19 RX ORDER — DIAZEPAM 10 MG/1
10 TABLET ORAL
Qty: 60 TAB | Refills: 0 | OUTPATIENT
Start: 2019-06-19 | End: 2019-09-14 | Stop reason: SDUPTHER

## 2019-06-19 NOTE — TELEPHONE ENCOUNTER
[facility] reports the last fill date for Valium as 4/11/19 for a 30 d/s. Last Visit: 4/16/19 with MD Little Parrish  Next Appointment: 7/8/19 with MD Little Parrish  Previous Refill Encounter(s): 4/10/19 #90 (pharmacy only dispensed #60)    Requested Prescriptions     Pending Prescriptions Disp Refills    diazePAM (VALIUM) 10 mg tablet 60 Tab 0     Sig: Take 1 Tab by mouth every twelve (12) hours as needed for Anxiety. Max Daily Amount: 20 mg.

## 2019-06-19 NOTE — TELEPHONE ENCOUNTER
Pt wife calling said her  called [facility] pharmacy 06/17- for a refill on his Diazapam- as of this afternoon they say do not have it please advise

## 2019-06-19 NOTE — TELEPHONE ENCOUNTER
Called diazepam into [address]
Quality 111:Pneumonia Vaccination Status For Older Adults: Pneumococcal Vaccination Previously Received
Quality 130: Documentation Of Current Medications In The Medical Record: Current Medications Documented
Detail Level: Detailed
Quality 131: Pain Assessment And Follow-Up: Pain assessment NOT documented as being performed, documentation the patient is not eligible for a pain assessment using a standardized tool

## 2019-07-02 ENCOUNTER — HOSPITAL ENCOUNTER (OUTPATIENT)
Dept: LAB | Age: 59
Discharge: HOME | End: 2019-07-02

## 2019-07-02 LAB — SENTARA SPECIMEN COL,SENBCF: NORMAL

## 2019-07-02 PROCEDURE — 99001 SPECIMEN HANDLING PT-LAB: CPT

## 2019-07-03 LAB
A-G RATIO,AGRAT: 2.2 RATIO (ref 1.1–2.6)
ALBUMIN SERPL-MCNC: 4.6 G/DL (ref 3.5–5)
ALP SERPL-CCNC: 58 U/L (ref 40–125)
ALT SERPL-CCNC: 22 U/L (ref 5–40)
ANION GAP SERPL CALC-SCNC: 13 MMOL/L
AST SERPL W P-5'-P-CCNC: 20 U/L (ref 10–37)
BILIRUB SERPL-MCNC: 0.3 MG/DL (ref 0.2–1.2)
BUN SERPL-MCNC: 15 MG/DL (ref 6–22)
CALCIUM SERPL-MCNC: 9 MG/DL (ref 8.4–10.4)
CHLORIDE SERPL-SCNC: 106 MMOL/L (ref 98–110)
CO2 SERPL-SCNC: 26 MMOL/L (ref 20–32)
CREAT SERPL-MCNC: 0.7 MG/DL (ref 0.8–1.6)
GFRAA, 66117: >60
GFRNA, 66118: >60
GLOBULIN,GLOB: 2.1 G/DL (ref 2–4)
GLUCOSE SERPL-MCNC: 107 MG/DL (ref 70–99)
LIPASE SERPL-CCNC: 29 U/L (ref 7–60)
POTASSIUM SERPL-SCNC: 4.4 MMOL/L (ref 3.5–5.5)
PROT SERPL-MCNC: 6.7 G/DL (ref 6.2–8.1)
SODIUM SERPL-SCNC: 145 MMOL/L (ref 133–145)

## 2019-07-04 NOTE — PROGRESS NOTES
59 y.o. WHITE OR  male who presents for RPE    We saw him last in April at which time he was c/o RUQ pain. Labs ok, US neg, we referred him to GI but he declined going for opinion. Currently, the pain is 'much better' and very infrequent, not really wanting to nicola further at this time. Remains on ppi and no breakthrough or alarm complaints    He had lost  A lot of weight doing keto approach although he's transitioned back to normal diet.       Vitals 7/8/2019 4/16/2019 6/26/2018 3/29/2018 3/22/2018   Weight 215 lb 211 lb 251 lb 240 lb 249 lb      The sleep apnea is controlled and he wears the mask religiously. However, he's been having recurring otitis and seeing Dr Alexia Ryder active mostly with yard work and no cardiovascular complaints. He had seen Dr Lisandro Crawley late March for abn rhythm strip, determined to be benign idioventricular rhythm.     No gu issues.       No sx referable to the thyroid    Past Medical History:   Diagnosis Date    Anxiety     LOPEZ-7 was 11/21 from 7/11    Arthritis     djd on mri c spine Dr. Trace Christiansen 3/13; knees bilat 2017    Colon polyps 2008    Dr. Dominique Oneal CVA (cerebral vascular accident) [facility]) 2006    Dr. Griselda Velazquez s/p PFO closure, carotids negative    Depression     PHQ-9 was 7/27 from (7/11); 10/27 from (5/15)    Diverticula of colon     Dyslipidemia     intol lipitor, prava, zocor; tolerated mevacor    FHx: heart disease     GERD (gastroesophageal reflux disease)     Hearing loss 2016    right sided; s/p myringotomy Dr Amauri Jo; now seeing diff ENT    Hemorrhoid     Hypogonadism male 2011    Dr Modesta Guzmán; dc'ed replacement tx 2015    Hypothyroidism     Nephrolithiasis     Obesity (BMI 30-39.9)     peak weight 245 lbs, bmi 34.7 from 5/15; dec julia supp, med sup wt loss, bariatrics; IF 6/18 start weight 251 but did not do; did keto/low carb 6/18    Peripheral neuropathy 2/13    on EMG negative serologies    PFO (patent foramen ovale) s/p closure  Alli 2006     Prediabetes     Primary hypertension 3/22/2018    Seasonal allergies     Sleep apnea 2006    Dr. Jesus Hopkins White coat hypertension      Past Surgical History:   Procedure Laterality Date    CARDIAC SURG PROCEDURE UNLIST  5/16    ETT negative    CARDIAC SURG PROCEDURE UNLIST  01/2018    echo nl lv, ef 60%, mild dd, mild/mod lvh, tr AR, aortic root 4cm, well seated pfo closure device Dr Jose Morris 2008 polyp; Dr Juan Double 2/3/12 neg    HX HERNIA REPAIR      HX KNEE ARTHROSCOPY      HX ORTHOPAEDIC      knee surgery    HX TONSILLECTOMY       Social History     Socioeconomic History    Marital status:      Spouse name: Not on file    Number of children: 2    Years of education: Not on file    Highest education level: Not on file   Occupational History    Occupation: auto parts worker   Social Needs    Financial resource strain: Not on file    Food insecurity:     Worry: Not on file     Inability: Not on file   [employer] needs:     Medical: Not on file     Non-medical: Not on file   Tobacco Use    Smoking status: Never Smoker    Smokeless tobacco: Never Used   Substance and Sexual Activity    Alcohol use: No    Drug use: No    Sexual activity: Yes     Partners: Female   Lifestyle    Physical activity:     Days per week: Not on file     Minutes per session: Not on file    Stress: Not on file   Relationships    Social connections:     Talks on phone: Not on file     Gets together: Not on file     Attends Scientologist service: Not on file     Active member of club or organization: Not on file     Attends meetings of clubs or organizations: Not on file     Relationship status: Not on file    Intimate partner violence:     Fear of current or ex partner: Not on file     Emotionally abused: Not on file     Physically abused: Not on file     Forced sexual activity: Not on file   Other Topics Concern    Not on file   Social History Narrative    Not on file     Allergies   Allergen Reactions    Amoxicillin (Bulk) Unknown (comments)     Rash around collar area    Codeine Other (comments)    Cymbalta [Duloxetine] Other (comments)     Gi upset    Erythromycin Diarrhea    Lipitor [Atorvastatin] Myalgia    Pcn [Penicillins] Rash    Pravastatin Myalgia    Septra [Sulfamethoprim Ds] Unknown (comments)    Simvastatin Myalgia     Current Outpatient Medications   Medication Sig    diazePAM (VALIUM) 10 mg tablet Take 1 Tab by mouth every twelve (12) hours as needed for Anxiety. Max Daily Amount: 20 mg.    CRESTOR 20 mg tablet TAKE 1 TABLET BY MOUTH ONCE NIGHTLY    VITAMIN D2 50,000 unit capsule TAKE ONE CAPSULE BY MOUTH TWICE A WEEK    levothyroxine (SYNTHROID) 100 mcg tablet TAKE ONE TABLET BY MOUTH ONCE DAILY BEFORE BREAKFAST    fluticasone (FLONASE) 50 mcg/actuation nasal spray USE TWO SPRAY(S) IN EACH NOSTRIL ONCE DAILY    diazePAM (VALIUM) 5 mg tablet TAKE 1 TABLET BY MOUTH EVERY 6 HOURS AS NEEDED    lisinopril (PRINIVIL, ZESTRIL) 2.5 mg tablet Take  by mouth daily. Indications: hypertension    furosemide (LASIX) 40 mg tablet TAKE ONE TABLET BY MOUTH ONCE DAILY    KLOR-CON M10 10 mEq tablet TAKE ONE TABLET BY MOUTH TWICE DAILY    omeprazole (PRILOSEC) 20 mg capsule Take 20 mg by mouth daily.  aspirin delayed-release 81 mg tablet Take 81 mg by mouth daily. No current facility-administered medications for this visit.       REVIEW OF SYSTEMS: colo 2/12 Dr Manda Allen  Ophtho  no vision change or eye pain  Oral  no mouth pain, tongue or tooth problems  Ears  no hearing loss, ear pain, fullness, no swallowing problems  Cardiac  no CP, PND, orthopnea, edema, palpitations or syncope  Chest  no breast masses  Resp  no wheezing, chronic coughing, dyspnea  GI  no heartburn, nausea, vomiting, change in bowel habits, bleeding, hemorrhoids  Urinary  no dysuria, hematuria, flank pain, urgency, frequency  Neuro  no focal weakness, numbness, paresthesias, incoordination, ataxia, involuntary movements  Endo - no polyuria, polydipsia, nocturia, hot flashes    Visit Vitals  /76   Pulse 68   Temp 98.4 °F (36.9 °C) (Oral)   Resp 14   Ht 6' 0.01\" (1.829 m)   Wt 215 lb (97.5 kg)   SpO2 100%   BMI 29.15 kg/m²   A&O x3  Affect is appropriate. Mood stable  No apparent distress  Anicteric, no JVD, adenopathy or thyromegaly. No carotid bruits or radiated murmur  Lungs clear to auscultation, no wheezes or rales  Heart showed regular rate and rhythm. No  rubs, gallops. 2/6 jodie lsb without rad  Abdomen soft nontender, no hepatosplenomegaly or masses. Extremities without edema. Pulses 1-2+ symmetrically  Left lateral distal calf shows isolated area where the insect bite is w granulation tissue.   Surrounding erythema, no fluctuance or drainage    LABS  From 2/10 showed   gluc 93,   cr 0.80,      alt 33,               chol 188, tg 74, hdl 45, ldl-c 128,             wbc 4.3, hb 14.3, plt 197,              psa 0.70, ua neg  From 10/11 showed gluc 102, cr 0.89, gfr 101, alt 25,           ldl-p 1499,             tsh 5.03, wbc 3.7, hb 13.4, plt 231  From 12/11 showed                     tsh 2.24,          ft4 1.02,  tpo ab+  From 4/12 showed                     tsh 4.18  From 9/12 showed   gluc 106, cr 0.70, gfr>60,  alt 34,           ldl-p 1839, chol 191, tg 76, hdl 42, ldl-c 134  From 1/13 showed   gluc 100,       hba1c 6.0,      chol 174, tg 52, hdl 35, ldl-c 127,                        vit d 22.3  From 4/13 showed         hba1c 5.8,                         b12 499,  fol 12.5, spep neg, guera neg  From 1/14 showed   gluc 97,   cr 0.90, gfr>60,  alt 26, hba1c 5.8,              chol 190, tg 85, hdl 44, ldl-c 129, tsh 3.72, wbc 4.9, hb 13.4, plt 262, vit d 29.2  From 5/15 showed   gluc 100, cr 0.91, gfr 94,   alt 24,       chol 177, tg 79, hdl 44, ldl-c 117, tsh 4.35, wbc 5.5, hb 13.1, plt 240  From 5/15 showed                     tsh 4.34,           b12 393,  fol 11.3  From 9/15 showed            chol 183, tg 96, hdl 47, ldl-c 117, tsh 3.00,          vit d 29.0  From 5/16 showed   gluc 99,   cr 0.90, gfr>60, alt 21, hba1c 6.0,               tsh 4.73, wbc 4.4, hb 13.3, plt 222  From 1/17 showed   gluc 104, cr 0.70, gfr>60, alt 25,       chol 157, tg 71, hdl 46, ldl-c 97,              wbc 3.6, hb 13.2, plt 240, vit d 24.2, tsh 2.41, psa 1.24  From 6/17 showed   gluc 104, cr 0.80, gfr>60, alt 20, hba1c 6.0,     chol 105, tg 34, hdl 59, ldl-c 40,           vit d 37.0  From 12/17 showed                             RA neg, CCP neg, crp 0.1  From 6/18 showed   gluc 92,   cr 0.70, gfr>60, alt 29, hba1c 5.7,      chol 132, tg 78, hdl 46, ldl-c 70,   tsh 1.92, wbc 3.4, hb 14.0, plt 237,  ft4 1.10,   psa 1.23, ua neg  From 4/19 showed   gluc 99,   cr 0.80, gfr>60, alt 27,                   lip 29    Results for orders placed or performed in visit on 99/57/34   METABOLIC PANEL, COMPREHENSIVE   Result Value Ref Range    Glucose 107 (H) 70 - 99 mg/dL    BUN 15 6 - 22 mg/dL    Creatinine 0.7 (L) 0.8 - 1.6 mg/dL    Sodium 145 133 - 145 mmol/L    Potassium 4.4 3.5 - 5.5 mmol/L    Chloride 106 98 - 110 mmol/L    CO2 26 20 - 32 mmol/L    AST (SGOT) 20 10 - 37 U/L    ALT (SGPT) 22 5 - 40 U/L    Alk.  phosphatase 58 40 - 125 U/L    Bilirubin, total 0.3 0.2 - 1.2 mg/dL    Calcium 9.0 8.4 - 10.4 mg/dL    Protein, total 6.7 6.2 - 8.1 g/dL    Albumin 4.6 3.5 - 5.0 g/dL    A-G Ratio 2.2 1.1 - 2.6 ratio    Globulin 2.1 2.0 - 4.0 g/dL    Anion gap 13.0 mmol/L    GFRAA >60.0 >60.0    GFRNA >60.0 >60.0   LIPASE   Result Value Ref Range    Lipase 29 7 - 60 U/L     Patient Active Problem List   Diagnosis Code    CVA Dr. Dawna Pat 2006 I63.9    Sleep Apnea Dr. Stacey Crooks 2006 G47.30    Anxiety and depression 2011 F41.9    Colon polyps and diverticulosis Dr. Marimar Dotson 2008 K63.5    Hypogonadism Dr. Barbra Valencia 2011 E29.1    Dyslipidemia E78.5    Hypovitaminosis D E55.9    Neuropathy G62.9    Hypothyroidism due to acquired atrophy of thyroid E03.4    Gastroesophageal reflux disease without esophagitis K21.9    Obesity (BMI 30-39. 9) E66.9    Prediabetes R73.03    Primary osteoarthritis involving multiple joints Dr Parth Sparrow M15.0    Primary hypertension I10    S/P percutaneous patent foramen ovale closure Z87.74     Assessment and plan:  1. HLP. Continue current regimen   2. Hypovit d. Continue current regimen. 3. Hypothyroidism. Check next draw  4. Hypogonadism. F/U Dr. Heydi James as needed, he is off replacement. 5. GERD. PPI and avoidance measures  6. Prediabetes. Lifestyle and dietary measures, wt loss would be ideal, a1c next draw  7. Neuropathy. Declined meds   8. Polyps and diverticulosis. Fiber, f/u Dr Ravi Neumann 2018  9. Obesity. Congratulated him on the wt loss, Continue current   10. MARLON. F/U Dr Karli Chu in August 11. ENT. F/U Dr Rosa Sifuentes  12.  GI. Consider HIDA vs GI consult if sx persist, he declined for now        RTC 7/20      Above conditions discussed at length and patient vocalized understanding.   All questions answered to patient satisfaction

## 2019-07-08 ENCOUNTER — TELEPHONE (OUTPATIENT)
Dept: INTERNAL MEDICINE CLINIC | Age: 59
End: 2019-07-08

## 2019-07-08 ENCOUNTER — OFFICE VISIT (OUTPATIENT)
Dept: INTERNAL MEDICINE CLINIC | Age: 59
End: 2019-07-08

## 2019-07-08 VITALS
DIASTOLIC BLOOD PRESSURE: 76 MMHG | TEMPERATURE: 98.4 F | OXYGEN SATURATION: 100 % | RESPIRATION RATE: 14 BRPM | BODY MASS INDEX: 29.12 KG/M2 | SYSTOLIC BLOOD PRESSURE: 128 MMHG | HEIGHT: 72 IN | WEIGHT: 215 LBS | HEART RATE: 68 BPM

## 2019-07-08 DIAGNOSIS — R73.03 PREDIABETES: ICD-10-CM

## 2019-07-08 DIAGNOSIS — M15.9 PRIMARY OSTEOARTHRITIS INVOLVING MULTIPLE JOINTS: ICD-10-CM

## 2019-07-08 DIAGNOSIS — E78.5 DYSLIPIDEMIA: ICD-10-CM

## 2019-07-08 DIAGNOSIS — I10 PRIMARY HYPERTENSION: ICD-10-CM

## 2019-07-08 DIAGNOSIS — K21.9 GASTROESOPHAGEAL REFLUX DISEASE WITHOUT ESOPHAGITIS: ICD-10-CM

## 2019-07-08 DIAGNOSIS — G62.9 NEUROPATHY: ICD-10-CM

## 2019-07-08 DIAGNOSIS — E66.3 OVERWEIGHT (BMI 25.0-29.9): ICD-10-CM

## 2019-07-08 DIAGNOSIS — K63.5 POLYP OF COLON, UNSPECIFIED PART OF COLON, UNSPECIFIED TYPE: ICD-10-CM

## 2019-07-08 DIAGNOSIS — I63.9 CEREBROVASCULAR ACCIDENT (CVA), UNSPECIFIED MECHANISM (HCC): ICD-10-CM

## 2019-07-08 DIAGNOSIS — E03.4 HYPOTHYROIDISM DUE TO ACQUIRED ATROPHY OF THYROID: ICD-10-CM

## 2019-07-08 DIAGNOSIS — Z00.00 PHYSICAL EXAM: Primary | ICD-10-CM

## 2019-07-08 DIAGNOSIS — G47.33 OBSTRUCTIVE SLEEP APNEA SYNDROME: ICD-10-CM

## 2019-07-08 DIAGNOSIS — Z00.00 PHYSICAL EXAM: ICD-10-CM

## 2019-07-08 NOTE — TELEPHONE ENCOUNTER
Patient wants to make sure that you order the additional labs as a part of his physical. The last time this happened he was charged because it was coded as something different.

## 2019-07-08 NOTE — PROGRESS NOTES
Nicho Sethi presents today for   Chief Complaint   Patient presents with    Physical     labs              Depression Screening:  3 most recent PHQ Screens 7/8/2019   Little interest or pleasure in doing things Not at all   Feeling down, depressed, irritable, or hopeless Not at all   Total Score PHQ 2 0       Learning Assessment:  Learning Assessment 7/8/2019   PRIMARY LEARNER Patient   HIGHEST LEVEL OF EDUCATION - PRIMARY LEARNER  GRADUATED HIGH SCHOOL OR GED   BARRIERS PRIMARY LEARNER NONE   CO-LEARNER CAREGIVER No   PRIMARY LANGUAGE ENGLISH   LEARNER PREFERENCE PRIMARY VIDEOS   ANSWERED BY Terry   RELATIONSHIP SELF       Abuse Screening:  Abuse Screening Questionnaire 7/8/2019   Do you ever feel afraid of your partner? N   Are you in a relationship with someone who physically or mentally threatens you? N   Is it safe for you to go home? Y       Fall Risk  No flowsheet data found. Coordination of Care:  1. Have you been to the ER, urgent care clinic since your last visit? Hospitalized since your last visit? no    2. Have you seen or consulted any other health care providers outside of the [address] [place] [address] since your last visit? Include any pap smears or colon screening.  no

## 2019-07-12 ENCOUNTER — HOSPITAL ENCOUNTER (OUTPATIENT)
Dept: LAB | Age: 59
Discharge: HOME | End: 2019-07-12

## 2019-07-12 LAB — SENTARA SPECIMEN COL,SENBCF: NORMAL

## 2019-07-12 PROCEDURE — 99001 SPECIMEN HANDLING PT-LAB: CPT

## 2019-07-13 LAB
AVG GLU, 10930: 109 MG/DL (ref 91–123)
CHOLEST SERPL-MCNC: 110 MG/DL (ref 110–200)
HBA1C MFR BLD HPLC: 5.4 % (ref 4.8–5.9)
HDLC SERPL-MCNC: 2.1 MG/DL (ref 0–5)
HDLC SERPL-MCNC: 52 MG/DL (ref 40–59)
LDLC SERPL CALC-MCNC: 48 MG/DL (ref 50–99)
TRIGL SERPL-MCNC: 46 MG/DL (ref 40–149)
TSH SERPL DL<=0.005 MIU/L-ACNC: 3.7 MCU/ML (ref 0.27–4.2)
VLDLC SERPL CALC-MCNC: 9 MG/DL (ref 8–30)

## 2019-08-06 RX ORDER — CIPROFLOXACIN AND FLUOCINOLONE ACETONIDE .75; .0625 MG/.25ML; MG/.25ML
0.25 SOLUTION AURICULAR (OTIC) 2 TIMES DAILY
Qty: 3.5 EACH | Refills: 1 | Status: SHIPPED | OUTPATIENT
Start: 2019-08-06

## 2019-08-06 NOTE — TELEPHONE ENCOUNTER
Andrei Pulido [phone] is the place it needs to go.    otovel 0.3-0.025% otic-soln    Says they are plastic containers you sqeeze into ear.

## 2019-08-06 NOTE — TELEPHONE ENCOUNTER
Pt called stating he currently is being seen at ENT Dr Rohini Peña , for chonic ear infections, he said he is having issues with his left ear, and needs a refill on antibiotic drops, HE was told the Dr is out of town and he would have to wait until he gets back (next [facility]) he is asking if Dr Rae Greer could call something in for him.

## 2019-09-14 ENCOUNTER — TELEPHONE (OUTPATIENT)
Dept: INTERNAL MEDICINE CLINIC | Age: 59
End: 2019-09-14

## 2019-09-14 DIAGNOSIS — F41.9 ANXIETY: ICD-10-CM

## 2019-09-16 RX ORDER — DIAZEPAM 10 MG/1
TABLET ORAL
Qty: 90 TAB | Refills: 0 | Status: SHIPPED | OUTPATIENT
Start: 2019-09-16 | End: 2019-12-13 | Stop reason: SDUPTHER

## 2019-09-17 ENCOUNTER — TELEPHONE (OUTPATIENT)
Dept: INTERNAL MEDICINE CLINIC | Age: 59
End: 2019-09-17

## 2019-09-17 DIAGNOSIS — Z79.899 MEDICATION MANAGEMENT: Primary | ICD-10-CM

## 2019-09-17 NOTE — TELEPHONE ENCOUNTER
Valium called into [facility] at 2:55pm 9/17/19. Do to regulations per Board of medicine in regards to controlled substances. Patient is required to do a urine drug screen  and a controlled substance agreement needs to be signed. Per verbal order from [address] [employer] a urine drug was ordered.

## 2019-09-25 RX ORDER — LISINOPRIL 2.5 MG/1
2.5 TABLET ORAL DAILY
Qty: 90 TAB | Refills: 3 | Status: SHIPPED | OUTPATIENT
Start: 2019-09-25 | End: 2021-06-09

## 2019-09-25 NOTE — TELEPHONE ENCOUNTER
This med is listed as historical. I show a different dose per outside meds chart. Please advise.     Last visit:  7/8/19 with MD Nisha Ren  Next appt:  7/13/20 with MD Nisha Ren

## 2019-10-07 RX ORDER — FLUTICASONE PROPIONATE 50 MCG
SPRAY, SUSPENSION (ML) NASAL
Qty: 3 BOTTLE | Refills: 3 | Status: SHIPPED | OUTPATIENT
Start: 2019-10-07 | End: 2022-02-21

## 2019-10-23 DIAGNOSIS — E03.4 HYPOTHYROIDISM DUE TO ACQUIRED ATROPHY OF THYROID: ICD-10-CM

## 2019-10-24 RX ORDER — LEVOTHYROXINE SODIUM 100 UG/1
TABLET ORAL
Qty: 90 TAB | Refills: 3 | Status: SHIPPED | OUTPATIENT
Start: 2019-10-24 | End: 2021-02-12

## 2019-12-13 DIAGNOSIS — F41.9 ANXIETY: ICD-10-CM

## 2019-12-13 RX ORDER — DIAZEPAM 10 MG/1
TABLET ORAL
Qty: 90 TAB | Refills: 0 | Status: SHIPPED | OUTPATIENT
Start: 2019-12-13 | End: 2020-02-27

## 2020-01-08 DIAGNOSIS — Z00.00 PHYSICAL EXAM: ICD-10-CM

## 2020-02-27 DIAGNOSIS — F41.9 ANXIETY: ICD-10-CM

## 2020-02-27 RX ORDER — DIAZEPAM 10 MG/1
TABLET ORAL
Qty: 90 TAB | Refills: 0 | Status: SHIPPED | OUTPATIENT
Start: 2020-02-27 | End: 2020-04-28 | Stop reason: SDUPTHER

## 2020-04-28 DIAGNOSIS — F41.9 ANXIETY: ICD-10-CM

## 2020-04-28 RX ORDER — DIAZEPAM 10 MG/1
10 TABLET ORAL
Qty: 90 TAB | Refills: 0 | Status: SHIPPED | OUTPATIENT
Start: 2020-04-28 | End: 2020-07-24 | Stop reason: SDUPTHER

## 2020-06-03 ENCOUNTER — TELEPHONE (OUTPATIENT)
Dept: INTERNAL MEDICINE CLINIC | Age: 60
End: 2020-06-03

## 2020-06-03 DIAGNOSIS — Z00.00 PHYSICAL EXAM: Primary | ICD-10-CM

## 2020-06-03 DIAGNOSIS — E03.4 HYPOTHYROIDISM DUE TO ACQUIRED ATROPHY OF THYROID: ICD-10-CM

## 2020-06-03 DIAGNOSIS — R73.03 PREDIABETES: ICD-10-CM

## 2020-06-03 DIAGNOSIS — E78.5 DYSLIPIDEMIA: ICD-10-CM

## 2020-07-03 ENCOUNTER — HOSPITAL ENCOUNTER (OUTPATIENT)
Dept: LAB | Age: 60
Discharge: HOME | End: 2020-07-03

## 2020-07-03 LAB
A-G RATIO,AGRAT: 2.1 RATIO (ref 1.1–2.6)
ALBUMIN SERPL-MCNC: 4.9 G/DL (ref 3.5–5)
ALP SERPL-CCNC: 65 U/L (ref 40–125)
ALT SERPL-CCNC: 18 U/L (ref 5–40)
ANION GAP SERPL CALC-SCNC: 14 MMOL/L
AST SERPL W P-5'-P-CCNC: 17 U/L (ref 10–37)
BILIRUB SERPL-MCNC: 0.3 MG/DL (ref 0.2–1.2)
BILIRUB UR QL: NEGATIVE
BUN SERPL-MCNC: 14 MG/DL (ref 6–22)
CALCIUM SERPL-MCNC: 9.6 MG/DL (ref 8.4–10.5)
CHLORIDE SERPL-SCNC: 105 MMOL/L (ref 98–110)
CHOLEST SERPL-MCNC: 118 MG/DL (ref 110–200)
CO2 SERPL-SCNC: 26 MMOL/L (ref 20–32)
CREAT SERPL-MCNC: 0.7 MG/DL (ref 0.8–1.6)
ERYTHROCYTE [DISTWIDTH] IN BLOOD BY AUTOMATED COUNT: 12.5 % (ref 10–15.5)
GFRAA, 66117: >60
GFRNA, 66118: >60
GLOBULIN,GLOB: 2.3 G/DL (ref 2–4)
GLUCOSE SERPL-MCNC: 103 MG/DL (ref 70–99)
GLUCOSE UR QL: NEGATIVE MG/DL
HCT VFR BLD AUTO: 47.2 % (ref 39.3–51.6)
HDLC SERPL-MCNC: 2 MG/DL (ref 0–5)
HDLC SERPL-MCNC: 58 MG/DL
HGB BLD-MCNC: 14.5 G/DL (ref 13.1–17.2)
HGB UR QL STRIP: NEGATIVE
KETONES UR QL STRIP.AUTO: NEGATIVE MG/DL
LDL/HDL RATIO,LDHD: 0.9
LDLC SERPL CALC-MCNC: 52 MG/DL (ref 50–99)
LEUKOCYTE ESTERASE: NEGATIVE
MCH RBC QN AUTO: 31 PG (ref 26–34)
MCHC RBC AUTO-ENTMCNC: 31 G/DL (ref 31–36)
MCV RBC AUTO: 99 FL (ref 80–95)
NITRITE UR QL STRIP.AUTO: NEGATIVE
NON-HDL CHOLESTEROL, 011976: 60 MG/DL
PH UR STRIP: 6 PH (ref 5–8)
PLATELET # BLD AUTO: 203 K/UL (ref 140–440)
PMV BLD AUTO: 11 FL (ref 9–13)
POTASSIUM SERPL-SCNC: 4.5 MMOL/L (ref 3.5–5.5)
PROT SERPL-MCNC: 7.2 G/DL (ref 6.2–8.1)
PROT UR QL STRIP: NEGATIVE MG/DL
PSA SERPL-MCNC: 2.12 NG/ML
RBC # BLD AUTO: 4.76 M/UL (ref 3.8–5.8)
RBC #/AREA URNS HPF: NORMAL /HPF
SENTARA SPECIMEN COL,SENBCF: NORMAL
SODIUM SERPL-SCNC: 145 MMOL/L (ref 133–145)
SP GR UR: 1.01 (ref 1–1.03)
T4 FREE SERPL-MCNC: 1 NG/DL (ref 0.9–1.8)
TRIGL SERPL-MCNC: 41 MG/DL (ref 40–149)
TSH SERPL DL<=0.005 MIU/L-ACNC: 3.06 MCU/ML (ref 0.27–4.2)
UROBILINOGEN UR STRIP-MCNC: <2 MG/DL
VLDLC SERPL CALC-MCNC: 8 MG/DL (ref 8–30)
WBC # BLD AUTO: 3.9 K/UL (ref 4–11)
WBC URNS QL MICRO: NORMAL /HPF (ref 0–2)

## 2020-07-03 PROCEDURE — 99001 SPECIMEN HANDLING PT-LAB: CPT

## 2020-07-04 LAB
AVG GLU, 10930: 118 MG/DL (ref 91–123)
HBA1C MFR BLD HPLC: 5.7 % (ref 4.8–5.6)

## 2020-07-08 NOTE — PROGRESS NOTES
62 y.o. WHITE OR  male who presents for RPE    Unfortunately, he came off the keto diet and then his best friend and another friend  with liver cancer in spring and he started stress eating and regained the weight. He feels depressed and slightly anxious especially with the pandemic. Denied any VI/hallucinations     Vitals 2020   Weight 244 lb 215 lb 211 lb 251 lb      The sleep apnea is controlled on cpap, no daytime somnolence, headaches, uncontrolled bp      Remains active mostly with yard work and his job and he reports no cardiovascular complaints. He has edema bilat ankles, has not been using the lasix as it didn't help     No gu or gi issues.       No sx referable to the thyroid    Past Medical History:   Diagnosis Date    Anxiety     LOPEZ-7 was  from     Arthritis     djd on mri c spine Dr. Solange Espinoza 3/13; knees bilat     Colon polyps     Dr. Maribeth Opitz CVA (cerebral vascular accident) [facility])     Dr. Yasmeen Taveras s/p PFO closure, carotids negative    Depression     PHQ-9 was  from (); 10/27 from (5/15)    Diverticula of colon     Dyslipidemia     intol lipitor, prava, zocor; tolerated mevacor    FHx: heart disease     GERD (gastroesophageal reflux disease)     Hearing loss     right sided; s/p myringotomy Dr Kayla Eller; now seeing diff ENT    Hemorrhoid     Hypogonadism male     Dr Dany Sampson; dc'ed replacement tx     Hypothyroidism     IFG (impaired fasting glucose)     Nephrolithiasis     Obesity (BMI 30-39.9)     peak weight 245 lbs, bmi 34.7 from 5/15; dec julia supp, med sup wt loss, bariatrics; IF  start weight 251 but did not do; did keto/low carb     Peripheral neuropathy     on EMG negative serologies    PFO (patent foramen ovale) s/p closure Dr. Lia Ramsey      Primary hypertension 3/22/2018    Seasonal allergies     Sleep apnea     Dr. Arcelia Wilde White coat hypertension      Past Surgical History:   Procedure Laterality Date    CARDIAC SURG PROCEDURE UNLIST  5/16    ETT negative    CARDIAC SURG PROCEDURE UNLIST  01/2018    echo nl lv, ef 60%, mild dd, mild/mod lvh, tr AR, aortic root 4cm, well seated pfo closure device Dr Best Walsh 2008 polyp; Dr Ras Titus 2/3/12 neg    HX HERNIA REPAIR      HX ORTHOPAEDIC      knee surgery    HX TONSILLECTOMY       Social History     Socioeconomic History    Marital status:      Spouse name: Not on file    Number of children: 2    Years of education: Not on file    Highest education level: Not on file   Occupational History    Occupation: auto parts worker   Social Needs    Financial resource strain: Not on file    Food insecurity     Worry: Not on file     Inability: Not on file   [employer] needs     Medical: Not on file     Non-medical: Not on file   Tobacco Use    Smoking status: Never Smoker    Smokeless tobacco: Never Used   Substance and Sexual Activity    Alcohol use: No    Drug use: No    Sexual activity: Yes     Partners: Female   Lifestyle    Physical activity     Days per week: Not on file     Minutes per session: Not on file    Stress: Not on file   Relationships    Social connections     Talks on phone: Not on file     Gets together: Not on file     Attends Yazidism service: Not on file     Active member of club or organization: Not on file     Attends meetings of clubs or organizations: Not on file     Relationship status: Not on file    Intimate partner violence     Fear of current or ex partner: Not on file     Emotionally abused: Not on file     Physically abused: Not on file     Forced sexual activity: Not on file   Other Topics Concern    Not on file   Social History Narrative    Not on file     Allergies   Allergen Reactions    Amoxicillin (Bulk) Unknown (comments)     Rash around collar area    Codeine Other (comments)    Cymbalta [Duloxetine] Other (comments) Gi upset    Erythromycin Diarrhea    Lipitor [Atorvastatin] Myalgia    Pcn [Penicillins] Rash    Pravastatin Myalgia    Septra [Sulfamethoprim Ds] Unknown (comments)    Simvastatin Myalgia     Current Outpatient Medications   Medication Sig    OTHER daily. CBD Oil    escitalopram oxalate (LEXAPRO) 20 mg tablet Take 1 Tab by mouth daily.  Crestor 20 mg tablet Take 1 tablet by mouth nightly    diazePAM (VALIUM) 10 mg tablet Take 1 Tab by mouth every twelve (12) hours as needed for Anxiety. Max Daily Amount: 20 mg.    Vitamin D2 1,250 mcg (50,000 unit) capsule TAKE 1 CAPSULE BY MOUTH TWICE A WEEK    levothyroxine (SYNTHROID) 100 mcg tablet TAKE 1 TABLET BY MOUTH ONCE DAILY BEFORE BREAKFAST    fluticasone propionate (FLONASE) 50 mcg/actuation nasal spray USE 2 SPRAY(S) IN EACH NOSTRIL ONCE DAILY    lisinopril (PRINIVIL, ZESTRIL) 2.5 mg tablet Take 1 Tab by mouth daily. Indications: high blood pressure    ciprofloxacin 0.3% -fluocinolone 0.025% (OTOVEL) 0.3-0.025 % (0.25 mL) otic solution Administer 0.25 mL into each ear two (2) times a day.  aspirin delayed-release 81 mg tablet Take 81 mg by mouth daily. No current facility-administered medications for this visit.       REVIEW OF SYSTEMS: colo 2/12 Dr Nasim Best  Ophtho - no vision change or eye pain  Oral - no mouth pain, tongue or tooth problems  Ears - no hearing loss, ear pain, fullness, no swallowing problems  Cardiac - no CP, PND, orthopnea, edema, palpitations or syncope  Chest - no breast masses  Resp - no wheezing, chronic coughing, dyspnea  GI - no heartburn, nausea, vomiting, change in bowel habits, bleeding, hemorrhoids  Urinary - no dysuria, hematuria, flank pain, urgency, frequency  Neuro - no focal weakness, numbness, paresthesias, incoordination, ataxia, involuntary movements  Endo - no polyuria, polydipsia, nocturia, hot flashes    Visit Vitals  /87   Pulse 81   Temp 97.5 °F (36.4 °C) (Temporal)   Resp 14   Ht 6' 0.01\" (1.829 m)   Wt 244 lb (110.7 kg)   SpO2 97%   BMI 33.08 kg/m²   A&O x3  Affect is appropriate. Mood stable  No apparent distress  Anicteric, no JVD, adenopathy or thyromegaly. No carotid bruits or radiated murmur  Lungs clear to auscultation, no wheezes or rales  Heart showed regular rate and rhythm. No  rubs, gallops. 2/6 jodie lsb without rad  Abdomen soft nontender, no hepatosplenomegaly or masses. Extremities without edema.   Pulses 1-2+ symmetrically    LABS  From 2/10 showed   gluc 93,   cr 0.80,      alt 33,               chol 188, tg 74, hdl 45, ldl-c 128,             wbc 4.3, hb 14.3, plt 197,                     psa 0.70, ua neg  From 10/11 showed gluc 102, cr 0.89, gfr 101, alt 25,           ldl-p 1499,             tsh 5.03, wbc 3.7, hb 13.4, plt 231  From 12/11 showed                     tsh 2.24,       ft4 1.02,  tpo ab+  From 4/12 showed                     tsh 4.18  From 9/12 showed   gluc 106, cr 0.70, gfr>60,  alt 34,           ldl-p 1839, chol 191, tg 76, hdl 42, ldl-c 134  From 1/13 showed   gluc 100,       hba1c 6.0,      chol 174, tg 52, hdl 35, ldl-c 127,                     vit d 22.3  From 4/13 showed         hba1c 5.8,                      b12 499,  fol 12.5, spep neg, guera neg  From 1/14 showed   gluc 97,   cr 0.90, gfr>60,  alt 26, hba1c 5.8,              chol 190, tg 85, hdl 44, ldl-c 129, tsh 3.72, wbc 4.9, hb 13.4, plt 262, vit d 29.2  From 5/15 showed   gluc 100, cr 0.91, gfr 94,   alt 24,       chol 177, tg 79, hdl 44, ldl-c 117, tsh 4.35, wbc 5.5, hb 13.1, plt 240  From 5/15 showed                     tsh 4.34,        b12 393,  fol 11.3  From 9/15 showed            chol 183, tg 96, hdl 47, ldl-c 117, tsh 3.00,       vit d 29.0  From 5/16 showed   gluc 99,   cr 0.90, gfr>60, alt 21, hba1c 6.0,               tsh 4.73, wbc 4.4, hb 13.3, plt 222  From 1/17 showed   gluc 104, cr 0.70, gfr>60, alt 25,       chol 157, tg 71, hdl 46, ldl-c 97,              wbc 3.6, hb 13.2, plt 240, vit d 24.2, tsh 2.41, psa 1.24  From 6/17 showed   gluc 104, cr 0.80, gfr>60, alt 20, hba1c 6.0,     chol 105, tg 34, hdl 59, ldl-c 40,       vit d 37.0  From 12/17 showed                        RA neg, CCP neg, crp 0.1  From 6/18 showed   gluc 92,   cr 0.70, gfr>60, alt 29, hba1c 5.7,      chol 132, tg 78, hdl 46, ldl-c 70,  tsh 1.92, wbc 3.4, hb 14.0, plt 237, ft4 1.10,        psa 1.23, ua neg  From 4/19 showed   gluc 99,   cr 0.80, gfr>60, alt 27,                         lip 29  From 7/19 showed   gluc 107, cr 0.70, gfr>60, alt 22, hba1c 5.4,      chol 110, tg 46, hdl 52, ldl-c 48,  tsh 3.70,                lip 29  From 7/20 showed   gluc 103, cr 0.70, gfr>60, alt 18, hba1c 5.4,      chol 118, tg 41, hdl 58, ldl;-c 52, tsh 3.06, wbc 3.9, hb 14.5, plt 203, ft4 1.00,        psa 2.12    Patient Active Problem List   Diagnosis Code    CVA Dr. Mykel Smyth 2006 I63.9    MARLON (obstructive sleep apnea) G47.33    Anxiety and depression 2011 F41.9    Colon polyps and diverticulosis Dr. Lizet Ventura 2008 K63.5    Hypogonadism Dr. Toño Daniels 2011 E29.1    Dyslipidemia E78.5    Hypovitaminosis D E55.9    Neuropathy G62.9    Hypothyroidism due to acquired atrophy of thyroid E03.4    Gastroesophageal reflux disease without esophagitis K21.9    IFG (impaired fasting glucose) R73.01    Primary osteoarthritis involving multiple joints Dr Lakshmi Hirsch M89.49    Primary hypertension I10    S/P percutaneous patent foramen ovale closure Z87.74    Overweight (BMI 25.0-29. 9) [id number]     Assessment and plan:  1. HLP. Continue current regimen   2. Hypovit d. Continue current regimen. 3. Hypothyroidism. Therapeutic   4. Hypogonadism. F/U Dr. Toño Daniels as needed, he is off replacement. 5. GERD. PPI and avoidance measures  6. Prediabetes. Lifestyle and dietary measures, wt loss would be ideal, a1c next draw  7. Neuropathy. Declined meds   8. Polyps and diverticulosis. Fiber, f/u Dr Hinds Shoulder 2018  9. Obesity. Lifestyle and dietary measures. Portion control reiterated. 10.  MARLON. F/U Dr Kyle Gaspar in August 11. Depression. Trial lexapro after discussing possible sfx, RTC 3 mos        RTC 10/21      Above conditions discussed at length and patient vocalized understanding.   All questions answered to patient satisfaction

## 2020-07-13 ENCOUNTER — OFFICE VISIT (OUTPATIENT)
Dept: INTERNAL MEDICINE CLINIC | Age: 60
End: 2020-07-13

## 2020-07-13 VITALS
BODY MASS INDEX: 33.05 KG/M2 | SYSTOLIC BLOOD PRESSURE: 138 MMHG | RESPIRATION RATE: 14 BRPM | TEMPERATURE: 97.5 F | HEART RATE: 81 BPM | WEIGHT: 244 LBS | OXYGEN SATURATION: 97 % | HEIGHT: 72 IN | DIASTOLIC BLOOD PRESSURE: 87 MMHG

## 2020-07-13 DIAGNOSIS — F41.9 ANXIETY: ICD-10-CM

## 2020-07-13 DIAGNOSIS — F32.0 CURRENT MILD EPISODE OF MAJOR DEPRESSIVE DISORDER WITHOUT PRIOR EPISODE (HCC): ICD-10-CM

## 2020-07-13 DIAGNOSIS — I10 PRIMARY HYPERTENSION: ICD-10-CM

## 2020-07-13 DIAGNOSIS — K21.9 GASTROESOPHAGEAL REFLUX DISEASE WITHOUT ESOPHAGITIS: ICD-10-CM

## 2020-07-13 DIAGNOSIS — Z00.00 PHYSICAL EXAM: Primary | ICD-10-CM

## 2020-07-13 DIAGNOSIS — G47.33 OSA (OBSTRUCTIVE SLEEP APNEA): ICD-10-CM

## 2020-07-13 DIAGNOSIS — E03.4 HYPOTHYROIDISM DUE TO ACQUIRED ATROPHY OF THYROID: ICD-10-CM

## 2020-07-13 DIAGNOSIS — I63.9 CEREBROVASCULAR ACCIDENT (CVA), UNSPECIFIED MECHANISM (HCC): ICD-10-CM

## 2020-07-13 DIAGNOSIS — E78.5 DYSLIPIDEMIA: ICD-10-CM

## 2020-07-13 DIAGNOSIS — R73.01 IFG (IMPAIRED FASTING GLUCOSE): ICD-10-CM

## 2020-07-13 RX ORDER — ESCITALOPRAM OXALATE 20 MG/1
20 TABLET ORAL DAILY
Qty: 30 TAB | Refills: 5 | Status: SHIPPED | OUTPATIENT
Start: 2020-07-13 | End: 2020-09-01 | Stop reason: SINTOL

## 2020-07-24 DIAGNOSIS — F41.9 ANXIETY: ICD-10-CM

## 2020-07-24 NOTE — TELEPHONE ENCOUNTER
No UDS on file    Last Visit: 07/13/2020 with MD Elvia Pratt  Next Appointment: 10/13/2020 with MD Elvia Pratt  Previous Refill Encounter(s): 04/28/2020 per MD Elvia Pratt #[id number]    Requested Prescriptions     Pending Prescriptions Disp Refills    diazePAM (VALIUM) 10 mg tablet 90 Tab 0     Sig: Take 1 Tab by mouth every twelve (12) hours as needed for Anxiety. Max Daily Amount: 20 mg.

## 2020-07-27 RX ORDER — DIAZEPAM 10 MG/1
10 TABLET ORAL
Qty: 90 TAB | Refills: 0 | Status: SHIPPED | OUTPATIENT
Start: 2020-07-27 | End: 2020-10-07 | Stop reason: SDUPTHER

## 2020-08-26 ENCOUNTER — TELEPHONE (OUTPATIENT)
Dept: INTERNAL MEDICINE CLINIC | Age: 60
End: 2020-08-26

## 2020-08-26 DIAGNOSIS — Z20.822 ENCOUNTER FOR LABORATORY TESTING FOR SEVERE ACUTE RESPIRATORY SYNDROME CORONAVIRUS 2 (SARS-COV-2): Primary | ICD-10-CM

## 2020-08-26 NOTE — TELEPHONE ENCOUNTER
Pt wants to be tested for Covid19. Said he woke up this morning with bad headache and real nauseated, said no recent exposure that he knows of but some people at work had it or were out a while back because they had been exposed.   Please advise him at [phone]

## 2020-08-26 NOTE — TELEPHONE ENCOUNTER
Patient scheduled at [facility]) tomorrow at 9:30- address and appointment time provided to patient. No further questions or concerns.

## 2020-08-31 ENCOUNTER — TELEPHONE (OUTPATIENT)
Dept: INTERNAL MEDICINE CLINIC | Age: 60
End: 2020-08-31

## 2020-08-31 DIAGNOSIS — F41.9 ANXIETY: Primary | ICD-10-CM

## 2020-08-31 NOTE — TELEPHONE ENCOUNTER
Pt calling, says Lexapro is not working for him. Tears his stomach up. Wants to know if he can try the generic for of Sabella? Says a friend uses it and recommended it.

## 2020-09-01 RX ORDER — VENLAFAXINE HYDROCHLORIDE 150 MG/1
150 CAPSULE, EXTENDED RELEASE ORAL DAILY
Qty: 30 CAP | Refills: 5 | Status: SHIPPED | OUTPATIENT
Start: 2020-09-01

## 2020-09-01 NOTE — TELEPHONE ENCOUNTER
No savella only indicated for fibromyalgia  We can try effexor which is similar and has indication for depression/anxiety

## 2020-10-07 DIAGNOSIS — F41.9 ANXIETY: ICD-10-CM

## 2020-10-07 NOTE — TELEPHONE ENCOUNTER
[facility] reports the last fill date for Valium as 8/18/20 for a 30 d/s. Last Visit: 7/13/20 with MD Diane Peterson  Next Appointment: 10/13/20 with MD Diane Peterson  Previous Refill Encounter(s): 7/27/20 #90    Requested Prescriptions     Pending Prescriptions Disp Refills    diazePAM (VALIUM) 10 mg tablet 90 Tab 0     Sig: Take 1 Tab by mouth every twelve (12) hours as needed for Anxiety. Max Daily Amount: 20 mg.

## 2020-10-08 RX ORDER — DIAZEPAM 10 MG/1
10 TABLET ORAL
Qty: 90 TAB | Refills: 0 | Status: SHIPPED | OUTPATIENT
Start: 2020-10-08 | End: 2020-11-29

## 2020-11-28 DIAGNOSIS — F41.9 ANXIETY: ICD-10-CM

## 2020-11-29 RX ORDER — DIAZEPAM 10 MG/1
TABLET ORAL
Qty: 90 TAB | Refills: 0 | Status: SHIPPED | OUTPATIENT
Start: 2020-11-29 | End: 2021-02-19

## 2021-02-19 DIAGNOSIS — F41.9 ANXIETY: ICD-10-CM

## 2021-02-19 RX ORDER — DIAZEPAM 10 MG/1
TABLET ORAL
Qty: 30 TAB | Refills: 0 | Status: SHIPPED | OUTPATIENT
Start: 2021-02-19 | End: 2021-03-24

## 2021-03-22 DIAGNOSIS — F41.9 ANXIETY: ICD-10-CM

## 2021-03-24 RX ORDER — DIAZEPAM 10 MG/1
TABLET ORAL
Qty: 30 TAB | Refills: 0 | Status: SHIPPED | OUTPATIENT
Start: 2021-03-24 | End: 2021-04-22

## 2021-04-21 DIAGNOSIS — F41.9 ANXIETY: ICD-10-CM

## 2021-04-22 RX ORDER — DIAZEPAM 10 MG/1
TABLET ORAL
Qty: 30 TAB | Refills: 0 | Status: SHIPPED | OUTPATIENT
Start: 2021-04-22 | End: 2021-05-18

## 2021-05-13 DIAGNOSIS — E78.5 DYSLIPIDEMIA: ICD-10-CM

## 2021-05-14 RX ORDER — ROSUVASTATIN CALCIUM 20 MG/1
TABLET, FILM COATED ORAL
Qty: 90 TAB | Refills: 0 | Status: SHIPPED | OUTPATIENT
Start: 2021-05-14 | End: 2021-08-16

## 2021-05-18 DIAGNOSIS — F41.9 ANXIETY: ICD-10-CM

## 2021-05-18 RX ORDER — DIAZEPAM 10 MG/1
TABLET ORAL
Qty: 30 TAB | Refills: 0 | Status: SHIPPED | OUTPATIENT
Start: 2021-05-18 | End: 2021-06-09

## 2021-06-09 DIAGNOSIS — F41.9 ANXIETY: ICD-10-CM

## 2021-06-09 RX ORDER — DIAZEPAM 10 MG/1
TABLET ORAL
Qty: 30 TABLET | Refills: 0 | Status: SHIPPED | OUTPATIENT
Start: 2021-06-09 | End: 2021-07-19

## 2021-06-09 RX ORDER — LISINOPRIL 2.5 MG/1
TABLET ORAL
Qty: 90 TABLET | Refills: 0 | Status: SHIPPED | OUTPATIENT
Start: 2021-06-09 | End: 2021-10-08

## 2021-07-16 DIAGNOSIS — F41.9 ANXIETY: ICD-10-CM

## 2021-07-19 ENCOUNTER — OFFICE VISIT (OUTPATIENT)
Dept: ORTHOPEDIC SURGERY | Age: 61
End: 2021-07-19
Payer: COMMERCIAL

## 2021-07-19 ENCOUNTER — TELEPHONE (OUTPATIENT)
Dept: INTERNAL MEDICINE CLINIC | Age: 61
End: 2021-07-19

## 2021-07-19 VITALS
BODY MASS INDEX: 35.21 KG/M2 | HEIGHT: 72 IN | OXYGEN SATURATION: 100 % | HEART RATE: 72 BPM | RESPIRATION RATE: 16 BRPM | WEIGHT: 260 LBS

## 2021-07-19 DIAGNOSIS — G89.29 CHRONIC PAIN OF LEFT KNEE: ICD-10-CM

## 2021-07-19 DIAGNOSIS — E78.5 DYSLIPIDEMIA: ICD-10-CM

## 2021-07-19 DIAGNOSIS — G89.29 CHRONIC PAIN OF RIGHT KNEE: ICD-10-CM

## 2021-07-19 DIAGNOSIS — R73.03 PREDIABETES: ICD-10-CM

## 2021-07-19 DIAGNOSIS — M17.0 PRIMARY OSTEOARTHRITIS OF BOTH KNEES: Primary | ICD-10-CM

## 2021-07-19 DIAGNOSIS — M25.562 CHRONIC PAIN OF LEFT KNEE: ICD-10-CM

## 2021-07-19 DIAGNOSIS — E03.4 HYPOTHYROIDISM DUE TO ACQUIRED ATROPHY OF THYROID: ICD-10-CM

## 2021-07-19 DIAGNOSIS — R73.01 IFG (IMPAIRED FASTING GLUCOSE): ICD-10-CM

## 2021-07-19 DIAGNOSIS — Z12.5 SCREENING PSA (PROSTATE SPECIFIC ANTIGEN): ICD-10-CM

## 2021-07-19 DIAGNOSIS — I10 PRIMARY HYPERTENSION: Primary | ICD-10-CM

## 2021-07-19 DIAGNOSIS — Z00.00 PHYSICAL EXAM: ICD-10-CM

## 2021-07-19 DIAGNOSIS — M25.561 CHRONIC PAIN OF RIGHT KNEE: ICD-10-CM

## 2021-07-19 DIAGNOSIS — I10 PRIMARY HYPERTENSION: ICD-10-CM

## 2021-07-19 PROCEDURE — 99214 OFFICE O/P EST MOD 30 MIN: CPT | Performed by: ORTHOPAEDIC SURGERY

## 2021-07-19 PROCEDURE — 73560 X-RAY EXAM OF KNEE 1 OR 2: CPT | Performed by: ORTHOPAEDIC SURGERY

## 2021-07-19 RX ORDER — DIAZEPAM 10 MG/1
TABLET ORAL
Qty: 30 TABLET | Refills: 0 | Status: SHIPPED | OUTPATIENT
Start: 2021-07-19 | End: 2021-08-16

## 2021-07-19 NOTE — TELEPHONE ENCOUNTER
Please order labs.  Pt goes to [facility] for collection    Pt has annual appt scheduled with Dr Ryan Chopra for 07/28/2021

## 2021-07-19 NOTE — PROGRESS NOTES
Magalys Plascencia  1960   Chief Complaint   Patient presents with    Knee Pain     bilateral        HISTORY OF PRESENT ILLNESS  Magalys Plascencia is a 59 y.o. male who presents today for reevaluation of b/l knee pain. Patient rates pain as 8/10 today. Had a right  knee scope done by myself in 2005 and a left knee scope done at [place] [facility] around 4 years ago. Had a cortisone injection in 2017 that provide minimal relief. Patient denies any fever, chills, chest pain, shortness of breath or calf pain. The remainder of the review of systems is negative. There are no new illness or injuries to report since last seen in the office. There are no changes to medications, allergies, family or social history. Pain Assessment  7/19/2021   Location of Pain Knee   Location Modifiers Left;Right   Severity of Pain 8   Quality of Pain Aching; Pooler Kicks; Throbbing   Duration of Pain Persistent   Frequency of Pain Intermittent   Aggravating Factors Standing;Walking;Stairs   Limiting Behavior Some   Relieving Factors Nothing   Relieving Factors Comment -   Result of Injury No   Work-Related Injury -   Type of Injury -         PHYSICAL EXAM:   Visit Vitals  Pulse 72   Resp 16   Ht 6' (1.829 m)   Wt 260 lb (117.9 kg)   SpO2 100%   BMI 35.26 kg/m²     The patient is a well-developed, well-nourished male   in no acute distress. The patient is alert and oriented times three. The patient is alert and oriented times three. Mood and affect are normal.  LYMPHATIC: lymph nodes are not enlarged and are within normal limits  SKIN: normal in color and non tender to palpation. There are no bruises or abrasions noted. NEUROLOGICAL: Motor sensory exam is within normal limits. Reflexes are equal bilaterally.  There is normal sensation to pinprick and light touch  MUSCULOSKELETAL:  Examination Left knee Right knee   Skin Intact Intact   Range of motion     Effusion  + +   Medial joint line tenderness  + +   Lateral joint line tenderness +  +   Tenderness Pes Bursa - -   Tenderness insertion MCL - -   Tenderness insertion LCL - -   Julios - -   Patella crepitus  +  +   Patella grind - -   Lachman - -   Pivot shift - -   Anterior drawer - -   Posterior drawer - -   Varus stress - -   Valgus stress - -   Neurovascular Intact Intact   Calf Swelling and Tenderness to Palpation - -   Jaydon's Test - -   Hamstring Cord Tightness - -          IMAGING: XR of the right knee with 2 views obtained in the office dated 7/19/2021 was reviewed and read by Dr. Magdalena Sampson:   IMPRESSION  Marked degenerative changes in the medial compartment    XR of the left knee with 2 views obtained in the office dated 7/19/2021 was reviewed and read by Dr. Magdalena Sampson:   IMPRESSION  Marked degenerative changes in the medial compartment      IMPRESSION:      ICD-10-CM ICD-9-CM    1. Primary osteoarthritis of both knees  M17.0 715.16 PROCEDURE AUTHORIZATION TO    2. Chronic pain of right knee  M25.561 719.46 AMB POC XRAY, KNEE; 1/2 VIEWS    G89.29 338.29 MRI KNEE RT WO CONT   3. Chronic pain of left knee  M25.562 719.46 AMB POC XRAY, KNEE; 1/2 VIEWS    G89.29 338.29         PLAN:   1. Pt presents with b/l knee pain due to primary OA and I discussed treatment options with him including cortisone, euflexxa, and surgery. I would like to refer him to Dr. Melia Mireles for further evaluation and consideration for knee replacements as the patient really would prefer to move onto a knee replacement as the stride multiple injections in the past.  He still not completely sure whether he would consider viscosupplementation  Risk factors include: BMI>35  2. No ultrasound exam indicated today  3. No cortisone injection indicated today   4. No Physical/Occupational Therapy indicated today  5. No diagnostic test indicated today:   6. No durable medical equipment indicated today  7. No referral indicated today   8. No medications indicated today:   9.  No Narcotic indicated today       RTC Follow up with Dr. Gonzalez Abo      Scribed by Tono Donato [address] [place] [address] [address]) as dictated by MD JENNIFER Chen, Dr. Darnell Lopez, confirm that all documentation is accurate.     Darnell Lopez M.D.   [facility]

## 2021-07-20 ENCOUNTER — HOSPITAL ENCOUNTER (OUTPATIENT)
Dept: LAB | Age: 61
Discharge: HOME | End: 2021-07-20

## 2021-07-20 LAB — SENTARA SPECIMEN COL,SENBCF: NORMAL

## 2021-07-20 PROCEDURE — 99001 SPECIMEN HANDLING PT-LAB: CPT

## 2021-07-21 LAB
A-G RATIO,AGRAT: 2 RATIO (ref 1.1–2.6)
ALBUMIN SERPL-MCNC: 4.8 G/DL (ref 3.5–5)
ALP SERPL-CCNC: 85 U/L (ref 40–125)
ALT SERPL-CCNC: 20 U/L (ref 5–40)
ANION GAP SERPL CALC-SCNC: 12 MMOL/L (ref 3–15)
AST SERPL W P-5'-P-CCNC: 23 U/L (ref 10–37)
AVG GLU, 10930: 123 MG/DL (ref 91–123)
BILIRUB SERPL-MCNC: 0.2 MG/DL (ref 0.2–1.2)
BUN SERPL-MCNC: 19 MG/DL (ref 6–22)
CALCIUM SERPL-MCNC: 9.6 MG/DL (ref 8.4–10.5)
CHLORIDE SERPL-SCNC: 107 MMOL/L (ref 98–110)
CHOLEST SERPL-MCNC: 130 MG/DL (ref 110–200)
CO2 SERPL-SCNC: 24 MMOL/L (ref 20–32)
CREAT SERPL-MCNC: 0.8 MG/DL (ref 0.8–1.6)
ERYTHROCYTE [DISTWIDTH] IN BLOOD BY AUTOMATED COUNT: 13.2 % (ref 10–15.5)
GFRAA, 66117: >60
GFRNA, 66118: >60
GLOBULIN,GLOB: 2.4 G/DL (ref 2–4)
GLUCOSE SERPL-MCNC: 110 MG/DL (ref 70–99)
HBA1C MFR BLD HPLC: 5.9 % (ref 4.8–5.6)
HCT VFR BLD AUTO: 49.8 % (ref 39.3–51.6)
HDLC SERPL-MCNC: 2.4 MG/DL (ref 0–5)
HDLC SERPL-MCNC: 54 MG/DL
HGB BLD-MCNC: 14.6 G/DL (ref 13.1–17.2)
LDL/HDL RATIO,LDHD: 1.2
LDLC SERPL CALC-MCNC: 62 MG/DL (ref 50–99)
MCH RBC QN AUTO: 31 PG (ref 26–34)
MCHC RBC AUTO-ENTMCNC: 29 G/DL (ref 31–36)
MCV RBC AUTO: 104 FL (ref 80–95)
NON-HDL CHOLESTEROL, 011976: 76 MG/DL
PLATELET # BLD AUTO: 171 K/UL (ref 140–440)
PMV BLD AUTO: 12.7 FL (ref 9–13)
POTASSIUM SERPL-SCNC: 4.6 MMOL/L (ref 3.5–5.5)
PROT SERPL-MCNC: 7.2 G/DL (ref 6.2–8.1)
PSA SERPL-MCNC: 2.11 NG/ML
RBC # BLD AUTO: 4.78 M/UL (ref 3.8–5.8)
SODIUM SERPL-SCNC: 143 MMOL/L (ref 133–145)
T4 FREE SERPL-MCNC: 1.2 NG/DL (ref 0.9–1.8)
TRIGL SERPL-MCNC: 72 MG/DL (ref 40–149)
TSH SERPL DL<=0.005 MIU/L-ACNC: 2.99 MCU/ML (ref 0.27–4.2)
VLDLC SERPL CALC-MCNC: 14 MG/DL (ref 8–30)
WBC # BLD AUTO: 4.2 K/UL (ref 4–11)

## 2021-07-23 NOTE — PROGRESS NOTES
59 y.o. WHITE/NON- male who presents for RPE    Unfortunately, he has not made any progress with attempts at wt loss. He saw the dietician once or twice over a decade ago and not interested in going back. Admits the diet is off and he could exercise more, declined julia suppressants     Vitals 7/28/2021 7/19/2021 7/13/2020 7/8/2019   Weight 257 lb 260 lb 244 lb 215 lb      No cardiovascular complaints. Exercise is limited by the knees     Dr Prasanna Pérez referred him to Dr Berenice East for consideration of TKR but he is not ready to go yet    The sleep apnea is controlled on cpap, no daytime somnolence, headaches, uncontrolled bp      Remains active mostly with yard work and his job and he reports no cardiovascular complaints. The edema persists but not using lasix     He has occasional 'choking spells' but not wanting it worked up right now.   Denied actual dysphagia, thinks it's more the food 'going down the wrong tube' periodically    He has occasional LUTS sx mostly nocturia and dec strength of stream.  Not interested in meds for now, however    No sx referable to the thyroid    He used the effexor briefly but came off after the grief reaction improved last year    He has a lesion in his left leg that is not going away for months now    Past Medical History:   Diagnosis Date    Anxiety     LOPEZ-7 was 11/21 from 7/11    Colon polyps 2008    Dr. Jumana Bach CVA (cerebral vascular accident) [facility]) 2006    Dr. Ranjit Hooper s/p PFO closure, carotids negative    Degenerative arthritis of cervical spine 2013    on mri    Depression     PHQ-9 was 7/27 from (7/11); 10/27 from (5/15); lexapro intol; effexor briefly 2020    Diverticulosis     Dyslipidemia     intol lipitor, prava, zocor; tolerated mevacor    FHx: heart disease     GERD (gastroesophageal reflux disease)     Hearing loss 2016    right sided; s/p myringotomy Dr Alan Krause; now seeing diff ENT    Hemorrhoid     Hypogonadism male 2011    Dr Kelley Cortés; dc'ed replacement tx 2015    Hypothyroidism     IFG (impaired fasting glucose) 2011    Nephrolithiasis     Obesity (BMI 30-39.9)     peak weight 245 lbs, bmi 34.7 from 5/15; dec julia supp, med sup wt loss, bariatrics; IF 6/18 start weight 251 but did not do; did keto/low carb 6/18    MARLON (obstructive sleep apnea) 2006    Dr. Jacinto Douglas Osteoarthritis of both knees 2017    Peripheral neuropathy 2/13    on EMG negative serologies    PFO (patent foramen ovale) s/p closure Dr. Negro Velásquez 2006     Primary hypertension 03/22/2018    component of white coat    Seasonal allergies      Past Surgical History:   Procedure Laterality Date    HX COLONOSCOPY      Dr. Sheliah Meckel 2008 polyp; Dr Berna Vanessa 2/3/12 neg    HX HERNIA REPAIR      HX KNEE ARTHROSCOPY Left 3-4 yrs ago    [address]    HX [place] UNLIST  5/16    ETT negative    MA CARDIAC SURG PROCEDURE UNLIST  01/2018    echo nl lv, ef 60%, mild dd, mild/mod lvh, tr AR, aortic root 4cm, well seated pfo closure device Dr Cristóbal Spivey History     Socioeconomic History    Marital status:      Spouse name: Not on file    Number of children: 2    Years of education: Not on file    Highest education level: Not on file   Occupational History    Occupation: auto parts worker   Tobacco Use    Smoking status: Never Smoker    Smokeless tobacco: Never Used   Substance and Sexual Activity    Alcohol use: No    Drug use: No    Sexual activity: Yes     Partners: Female   Other Topics Concern    Not on file   Social History Narrative    Not on file     Social Determinants of Health     Financial Resource Strain:     Difficulty of Paying Living Expenses:    Food Insecurity:     Worried About Running Out of Food in the Last Year:     [address] in the Last Year:    Transportation Needs:     Lack of Transportation (Medical):      Lack of Transportation (Non-Medical):    Physical Activity:     Days of Exercise per Week:     Minutes of Exercise per Session:    Stress:     Feeling of Stress :    Social Connections:     Frequency of Communication with Friends and Family:     Frequency of Social Gatherings with Friends and Family:     Attends [employer] Services:     Active Member of Clubs or Organizations:     Attends Club or Organization Meetings:     Marital Status:    Intimate Partner Violence:     Fear of Current or Ex-Partner:     Emotionally Abused:     Physically Abused:     Sexually Abused: Allergies   Allergen Reactions    Amoxicillin (Bulk) Unknown (comments)     Rash around collar area    Codeine Other (comments)    Cymbalta [Duloxetine] Other (comments)     Gi upset    Erythromycin Diarrhea    Lexapro [Escitalopram Oxalate] Other (comments)     Gi upset      Lipitor [Atorvastatin] Myalgia    Pcn [Penicillins] Rash    Pravastatin Myalgia    Septra [Sulfamethoprim Ds] Unknown (comments)    Simvastatin Myalgia     Current Outpatient Medications   Medication Sig    ergocalciferol (ERGOCALCIFEROL) 1,250 mcg (50,000 unit) capsule TAKE 1 CAPSULE BY MOUTH TWICE A WEEK    diazePAM (VALIUM) 10 mg tablet TAKE 1 TABLET BY MOUTH EVERY 12 HOURS AS NEEDED FOR ANXIETY . DO NOT EXCEED 2 TABLETS PER 24 HOURS    lisinopriL (PRINIVIL, ZESTRIL) 2.5 mg tablet TAKE 1 TABLET BY MOUTH ONCE DAILY FOR HIGH BLOOD PRESSURE    Crestor 20 mg tablet Take 1 tablet by mouth nightly    levothyroxine (SYNTHROID) 100 mcg tablet TAKE 1 TABLET BY MOUTH ONCE DAILY BEFORE BREAKFAST    OTHER daily. CBD Oil    fluticasone propionate (FLONASE) 50 mcg/actuation nasal spray USE 2 SPRAY(S) IN EACH NOSTRIL ONCE DAILY    aspirin delayed-release 81 mg tablet Take 81 mg by mouth daily.  venlafaxine-SR (EFFEXOR-XR) 150 mg capsule Take 1 Cap by mouth daily.  (Patient not taking: Reported on 7/19/2021)    ciprofloxacin 0.3% -fluocinolone 0.025% (OTOVEL) 0.3-0.025 % (0.25 mL) otic solution Administer 0.25 mL into each ear two (2) times a day. (Patient not taking: Reported on 7/19/2021)     No current facility-administered medications for this visit. REVIEW OF SYSTEMS: colo 2/12 Dr Cheryl Pedro  Ophtho  no vision change or eye pain  Oral  no mouth pain, tongue or tooth problems  Ears  no hearing loss, ear pain, fullness, no swallowing problems  Cardiac  no CP, PND, orthopnea, edema, palpitations or syncope  Chest  no breast masses  Resp  no wheezing, chronic coughing, dyspnea  GI  no heartburn, nausea, vomiting, change in bowel habits, bleeding, hemorrhoids  Urinary  no dysuria, hematuria, flank pain, urgency, frequency  Neuro  no focal weakness, numbness, paresthesias, incoordination, ataxia, involuntary movements  Endo - no polyuria, polydipsia, nocturia, hot flashes    Visit Vitals  /78   Pulse 70   Temp 98.1 °F (36.7 °C) (Temporal)   Resp 16   Ht 6' (1.829 m)   Wt 257 lb (116.6 kg)   SpO2 99%   BMI 34.86 kg/m²   A&O x3  Affect is appropriate. Mood stable  No apparent distress  Anicteric, no JVD, adenopathy or thyromegaly. No carotid bruits or radiated murmur  Lungs clear to auscultation, no wheezes or rales  Heart showed regular rate and rhythm. No  rubs, gallops. 2/6 jodie lsb without rad  Abdomen soft nontender, no hepatosplenomegaly or masses. Extremities with 1+ bilat edema above ankles.   Pulses 1-2+ symmetrically  Rectal deferred as going for colo early 2022    LABS  From 2/10 showed   gluc 93,   cr 0.80,      alt 33,               chol 188, tg 74, hdl 45, ldl-c 128,             wbc 4.3, hb 14.3, plt 197,            psa 0.70, ua neg  From 10/11 showed gluc 102, cr 0.89, gfr 101, alt 25,           ldl-p 1499,             tsh 5.03, wbc 3.7, hb 13.4, plt 231  From 12/11 showed                     tsh 2.24,          ft4 1.02,    tpo ab+  From 4/12 showed                     tsh 4.18  From 9/12 showed   gluc 106, cr 0.70, gfr>60,  alt 34,           ldl-p 1839, chol 191, tg 76, hdl 42, ldl-c 134  From 1/13 showed   gluc 100,       hba1c 6.0,      chol 174, tg 52, hdl 35, ldl-c 127,                        vit d 22.3  From 4/13 showed         hba1c 5.8,                                    b12 499,  fol 12.5, spep neg, guera neg  From 1/14 showed   gluc 97,   cr 0.90, gfr>60,  alt 26, hba1c 5.8,              chol 190, tg 85, hdl 44, ldl-c 129, tsh 3.72, wbc 4.9, hb 13.4, plt 262, vit d 29.2  From 5/15 showed   gluc 100, cr 0.91, gfr 94,   alt 24,       chol 177, tg 79, hdl 44, ldl-c 117, tsh 4.35, wbc 5.5, hb 13.1, plt 240  From 5/15 showed                     tsh 4.34,                      b12 393,  fol 11.3  From 9/15 showed            chol 183, tg 96, hdl 47, ldl-c 117, tsh 3.00,          vit d 29.0  From 5/16 showed   gluc 99,   cr 0.90, gfr>60, alt 21, hba1c 6.0,               tsh 4.73, wbc 4.4, hb 13.3, plt 222  From 1/17 showed   gluc 104, cr 0.70, gfr>60, alt 25,       chol 157, tg 71, hdl 46, ldl-c 97,   tsh 2.41, wbc 3.6, hb 13.2, plt 240, vit d 24.2, psa 1.24  From 6/17 showed   gluc 104, cr 0.80, gfr>60, alt 20, hba1c 6.0,     chol 105, tg 34, hdl 59, ldl-c 40,          vit d 37.0  From 12/17 showed                             RA neg,   CCP neg, crp 0.1  From 6/18 showed   gluc 92,   cr 0.70, gfr>60, alt 29, hba1c 5.7,      chol 132, tg 78, hdl 46, ldl-c 70,   tsh 1.92, wbc 3.4, hb 14.0, plt 237, ft4 1.10,    psa 1.23, ua neg  From 4/19 showed   gluc 99,   cr 0.80, gfr>60, alt 27,                              lip 29  From 7/19 showed   gluc 107, cr 0.70, gfr>60, alt 22, hba1c 5.4,      chol 110, tg 46, hdl 52, ldl-c 48,   tsh 3.70,                    lip 29  From 7/20 showed   gluc 103, cr 0.70, gfr>60, alt 18, hba1c 5.4,      chol 118, tg 41, hdl 58, ldl-c 52,   tsh 3.06, wbc 3.9, hb 14.5, plt 203, ft4 1.00,    psa 2.12  From 7/21 showed   gluc 110, cr 0.80, gfr>60, alt 20, hba1c 5.9,      chol 130, tg 72, hdl 54, ldl-c 62,   tsh 2.99, wbc 4.2, hb 14.6, plt 171, ft4 1.20,    psa 2.11     We reviewed the patient's labs from the last several visits to point out trends in the numbers          Patient Active Problem List   Diagnosis Code    CVA Dr. Todd Pan 2006 I63.9    MARLON (obstructive sleep apnea) G47.33    Anxiety and depression 2011 F41.9    Colon polyps and diverticulosis Dr. Spencer Gorman 2008 K63.5    Hypogonadism Dr. Franco Sees 2011 E29.1    Dyslipidemia E78.5    Hypovitaminosis D E55.9    Neuropathy G62.9    Hypothyroidism due to acquired atrophy of thyroid E03.4    Gastroesophageal reflux disease without esophagitis K21.9    IFG (impaired fasting glucose) R73.01    Primary osteoarthritis involving multiple joints Dr Rafat Batista M89.49    Primary hypertension I10    S/P percutaneous patent foramen ovale closure Z87.74    Obesity (BMI 30.0-34. 9) E66.9     Assessment and plan:  1. HLP. Continue current regimen   2. Hypovit d. Continue current regimen. 3. Hypothyroidism. Therapeutic   4. Hypogonadism. F/U Dr. Franco Sees as needed, he is off replacement. 5. GERD. PPI and avoidance measures  6. IFG. Lifestyle and dietary measures, wt loss would be ideal, a1c next draw  7. Neuropathy. Declined meds   8. Polyps and diverticulosis. Fiber, f/u Dr Spencer Gorman 2018  9. Obesity. Lifestyle and dietary measures. Portion control reiterated. 10.  MARLON. F/U Dr Patricia Moya in August  11. OA knees. Per Dr Corina Palma  12. LUTS. Declined meds  13. Screening colo early next year  15. Derm. Referred Dr Rober Riggs  15. Edema. Declined restarting diuretic  16.  'choking episodes'/  Declined ba swallow/speech evaluation. Call if worsening sx or changes his mind        RTC 7/22      Above conditions discussed at length and patient vocalized understanding.   All questions answered to patient satisfaction

## 2021-07-28 ENCOUNTER — OFFICE VISIT (OUTPATIENT)
Dept: INTERNAL MEDICINE CLINIC | Age: 61
End: 2021-07-28
Payer: COMMERCIAL

## 2021-07-28 VITALS
DIASTOLIC BLOOD PRESSURE: 78 MMHG | HEIGHT: 72 IN | SYSTOLIC BLOOD PRESSURE: 136 MMHG | OXYGEN SATURATION: 99 % | TEMPERATURE: 98.1 F | RESPIRATION RATE: 16 BRPM | BODY MASS INDEX: 34.81 KG/M2 | WEIGHT: 257 LBS | HEART RATE: 70 BPM

## 2021-07-28 DIAGNOSIS — E66.9 OBESITY (BMI 30.0-34.9): ICD-10-CM

## 2021-07-28 DIAGNOSIS — K63.5 POLYP OF COLON, UNSPECIFIED PART OF COLON, UNSPECIFIED TYPE: ICD-10-CM

## 2021-07-28 DIAGNOSIS — E78.5 DYSLIPIDEMIA: ICD-10-CM

## 2021-07-28 DIAGNOSIS — Z00.00 PHYSICAL EXAM: ICD-10-CM

## 2021-07-28 DIAGNOSIS — K21.9 GASTROESOPHAGEAL REFLUX DISEASE WITHOUT ESOPHAGITIS: ICD-10-CM

## 2021-07-28 DIAGNOSIS — I63.9 CEREBROVASCULAR ACCIDENT (CVA), UNSPECIFIED MECHANISM (HCC): ICD-10-CM

## 2021-07-28 DIAGNOSIS — Z00.00 PHYSICAL EXAM: Primary | ICD-10-CM

## 2021-07-28 DIAGNOSIS — L98.9 SKIN LESION: ICD-10-CM

## 2021-07-28 DIAGNOSIS — R73.01 IFG (IMPAIRED FASTING GLUCOSE): ICD-10-CM

## 2021-07-28 DIAGNOSIS — I10 PRIMARY HYPERTENSION: ICD-10-CM

## 2021-07-28 DIAGNOSIS — G47.33 OSA (OBSTRUCTIVE SLEEP APNEA): ICD-10-CM

## 2021-07-28 DIAGNOSIS — E03.4 HYPOTHYROIDISM DUE TO ACQUIRED ATROPHY OF THYROID: ICD-10-CM

## 2021-07-28 PROBLEM — E66.3 OVERWEIGHT (BMI 25.0-29.9): Status: RESOLVED | Noted: 2019-07-08 | Resolved: 2021-07-28

## 2021-07-28 PROCEDURE — 99396 PREV VISIT EST AGE 40-64: CPT | Performed by: INTERNAL MEDICINE

## 2021-07-28 NOTE — PROGRESS NOTES
Pawan Watson presents today for   Chief Complaint   Patient presents with    Physical     one year    Cholesterol Problem    Thyroid Problem    Hypertension    Labs     7-19-21     Coordination of Care:  1. Have you been to the ER, urgent care clinic since your last visit? Hospitalized since your last visit? NO    2. Have you seen or consulted any other health care providers outside of the [address] [place] [address] since your last visit? Include any pap smears or colon screening.  NO

## 2021-07-29 ENCOUNTER — TELEPHONE (OUTPATIENT)
Dept: INTERNAL MEDICINE CLINIC | Age: 61
End: 2021-07-29

## 2021-07-29 DIAGNOSIS — R13.10 SWALLOWING PROBLEM: Primary | ICD-10-CM

## 2021-07-29 NOTE — TELEPHONE ENCOUNTER
Suggest speech therapy evaluation for swallowing disorder - will not need to see MD as yet    ordered

## 2021-07-29 NOTE — TELEPHONE ENCOUNTER
Wife calling, says pt discussed having a hard time swallowing yesterday at appt. They do want to have this looked at. Can you put in referral and call her with information on what doctor he is being referred to?

## 2021-07-30 NOTE — TELEPHONE ENCOUNTER
Patient made aware of ST evaluation order, he stated he was unsure if he wanted to go through with it or not, but will make a decision when he receives a call.

## 2021-08-12 DIAGNOSIS — R13.10 SWALLOWING PROBLEM: ICD-10-CM

## 2021-08-20 ENCOUNTER — OFFICE VISIT (OUTPATIENT)
Dept: ORTHOPEDIC SURGERY | Age: 61
End: 2021-08-20
Payer: COMMERCIAL

## 2021-08-20 VITALS — HEIGHT: 72 IN | BODY MASS INDEX: 35.62 KG/M2 | TEMPERATURE: 97.7 F | WEIGHT: 263 LBS

## 2021-08-20 DIAGNOSIS — M17.11 ARTHRITIS OF RIGHT KNEE: ICD-10-CM

## 2021-08-20 DIAGNOSIS — M25.562 LEFT KNEE PAIN, UNSPECIFIED CHRONICITY: ICD-10-CM

## 2021-08-20 DIAGNOSIS — M17.12 ARTHRITIS OF LEFT KNEE: Primary | ICD-10-CM

## 2021-08-20 DIAGNOSIS — M25.561 RIGHT KNEE PAIN, UNSPECIFIED CHRONICITY: ICD-10-CM

## 2021-08-20 PROCEDURE — 99214 OFFICE O/P EST MOD 30 MIN: CPT | Performed by: PHYSICIAN ASSISTANT

## 2021-08-20 NOTE — PROGRESS NOTES
[address], [address] [facility]  [phone]           Patient: Yokasta Mercado                MRN: [id number]       SSN: [id number]  YOB: 1960        AGE: 59 y.o. SEX: male  Body mass index is 35.67 kg/m². PCP: Murali Galdamez MD  08/20/21      This office note has been dictated. REVIEW OF SYSTEMS:  Constitutional: Negative for fever, chills, weight loss and malaise/fatigue. HENT: Negative. Eyes: Negative. Respiratory: Negative. Cardiovascular: Negative. Gastrointestinal: No bowel incontinence or constipation. Genitourinary: No bladder incontinence or saddle anesthesia. Skin: Negative. Neurological: Negative. Endo/Heme/Allergies: Negative. Psychiatric/Behavioral: Negative. Musculoskeletal: As per HPI above.      Past Medical History:   Diagnosis Date    Anxiety     LOPEZ-7 was 11/21 from 7/11    Colon polyps 2008    Dr. Daisy Briscoe CVA (cerebral vascular accident) [facility]) 2006    Dr. Truong Child s/p PFO closure, carotids negative    Degenerative arthritis of cervical spine 2013    on mri    Depression     PHQ-9 was 7/27 from (7/11); 10/27 from (5/15); lexapro intol; effexor briefly 2020    Diverticulosis     Dyslipidemia     intol lipitor, prava, zocor; tolerated mevacor    FHx: heart disease     GERD (gastroesophageal reflux disease)     Hearing loss 2016    right sided; s/p myringotomy Dr Kristi Giraldo; now seeing diff ENT    Hemorrhoid     Hypogonadism male 2011    Dr Spencer Pearson; dc'ed replacement tx 2015    Hypothyroidism     IFG (impaired fasting glucose) 2011    Nephrolithiasis     Obesity (BMI 30-39.9)     peak weight 245 lbs, bmi 34.7 from 5/15; dec julia supp, med sup wt loss, bariatrics; IF 6/18 start weight 251 but did not do; did keto/low carb 6/18    MARLON (obstructive sleep apnea) 2006    Dr. Delisa Lovelace Osteoarthritis of both knees 2017    Peripheral neuropathy 2/13    on EMG negative serologies    PFO (patent foramen ovale) s/p closure Dr. Negro Velásquez 2006     Primary hypertension 03/22/2018    component of white coat    Seasonal allergies          Current Outpatient Medications:     diazePAM (VALIUM) 10 mg tablet, TAKE 1 TABLET BY MOUTH EVERY 12 HOURS AS NEEDED FOR ANXIETY . DO NOT EXCEED 2 TABS PER 24 HOURS, Disp: 30 Tablet, Rfl: 0    Crestor 20 mg tablet, Take 1 tablet by mouth nightly, Disp: 90 Tablet, Rfl: 3    ergocalciferol (ERGOCALCIFEROL) 1,250 mcg (50,000 unit) capsule, TAKE 1 CAPSULE BY MOUTH TWICE A WEEK, Disp: 8 Capsule, Rfl: 1    lisinopriL (PRINIVIL, ZESTRIL) 2.5 mg tablet, TAKE 1 TABLET BY MOUTH ONCE DAILY FOR HIGH BLOOD PRESSURE, Disp: 90 Tablet, Rfl: 0    levothyroxine (SYNTHROID) 100 mcg tablet, TAKE 1 TABLET BY MOUTH ONCE DAILY BEFORE BREAKFAST, Disp: 90 Tab, Rfl: 3    venlafaxine-SR (EFFEXOR-XR) 150 mg capsule, Take 1 Cap by mouth daily. , Disp: 30 Cap, Rfl: 5    OTHER, daily. CBD Oil, Disp: , Rfl:     fluticasone propionate (FLONASE) 50 mcg/actuation nasal spray, USE 2 SPRAY(S) IN EACH NOSTRIL ONCE DAILY, Disp: 3 Bottle, Rfl: 3    aspirin delayed-release 81 mg tablet, Take 81 mg by mouth daily. , Disp: , Rfl:     ciprofloxacin 0.3% -fluocinolone 0.025% (OTOVEL) 0.3-0.025 % (0.25 mL) otic solution, Administer 0.25 mL into each ear two (2) times a day.  (Patient not taking: Reported on 7/19/2021), Disp: 3.5 Each, Rfl: 1    Allergies   Allergen Reactions    Amoxicillin (Bulk) Unknown (comments)     Rash around collar area    Codeine Other (comments)    Cymbalta [Duloxetine] Other (comments)     Gi upset    Erythromycin Diarrhea    Lexapro [Escitalopram Oxalate] Other (comments)     Gi upset      Lipitor [Atorvastatin] Myalgia    Pcn [Penicillins] Rash    Pravastatin Myalgia    Septra [Sulfamethoprim Ds] Unknown (comments)    Simvastatin Myalgia       Social History     Socioeconomic History    Marital status:      Spouse name: Not on file    Number of children: 2    Years of education: Not on file    Highest education level: Not on file   Occupational History    Occupation: auto parts worker   Tobacco Use    Smoking status: Never Smoker    Smokeless tobacco: Never Used   Substance and Sexual Activity    Alcohol use: No    Drug use: No    Sexual activity: Yes     Partners: Female   Other Topics Concern    Not on file   Social History Narrative    Not on file     Social Determinants of Health     Financial Resource Strain:     Difficulty of Paying Living Expenses:    Food Insecurity:     Worried About Running Out of Food in the Last Year:     [address] in the Last Year:    Transportation Needs:     Lack of Transportation (Medical):  Lack of Transportation (Non-Medical):    Physical Activity:     Days of Exercise per Week:     Minutes of Exercise per Session:    Stress:     Feeling of Stress :    Social Connections:     Frequency of Communication with Friends and Family:     Frequency of Social Gatherings with Friends and Family:     Attends Mosque Services:     Active Member of Clubs or Organizations:     Attends Club or Organization Meetings:     Marital Status:    Intimate Partner Violence:     Fear of Current or Ex-Partner:     Emotionally Abused:     Physically Abused:     Sexually Abused:        Past Surgical History:   Procedure Laterality Date    HX COLONOSCOPY      Dr. Shalom Kelley 2008 polyp; Dr Jose A Henderson 2/3/12 neg    HX HERNIA REPAIR      HX KNEE ARTHROSCOPY Left 3-4 yrs ago    [facility]  5/16    ETT negative    DE CARDIAC SURG PROCEDURE UNLIST  01/2018    echo nl lv, ef 60%, mild dd, mild/mod lvh, tr AR, aortic root 4cm, well seated pfo closure device Dr Pat Rodriguez             Patient seen evaluate today for bilateral knees.   He has been seen by Dr. Sveta Ceron in the past.  Is had previous knee arthroscopy on the right knee as well as the left knee by Alveta Bamberger. He has had cortisone injections for each of his knees which have not helped him much. The left knee is worse of the 2. He does have decreased walking tolerance. He has trouble with stairs. He has pain at night. Patient denies recent fevers, chills, chest pain, SOB, or injuries. No recent systemic changes noted. A 12-point review of systems is performed today. Pertinent positives are noted. All other systems reviewed and otherwise are negative. Physical exam: General: Alert and oriented x3, nad.  well-developed, well nourished. normal affect, AF. NC/AT, EOMI, neck supple, trachea midline, no JVD present. Breathing is non-labored. Examination of the lower extremities reveals pain-free range of motion hips. There is no pain to palpation trochanter bursa. Neck straight leg raise. Negative calf tenderness. Negative Homans. No signs of DVT present. He to the knees with skin intact but there is no erythema, ecchymosis or warmth noted. There are no signs of infection or cellulitis present. He does have discomfort palpation medial joint line as well as with patellofemoral grind and some crepitus anteriorly with range of motion activities noted. Review of previous radiographs does show fairly advanced arthritis of each of his knees a bit worse on the right and and today in the office 8/20/2021 at the [facility] location of tunnel view was obtained showing end-stage arthritis of the right knee and advanced on the left. Assessment: Bilateral knee osteoarthritis with left more symptomatic    Plan: At this point, we discussed treatment options. He does have advancing arthritis of the left knee and I like to get some more information by obtaining an MRI for further evaluation of the cartilage as well as meniscal pathology. We will see him back in the office after the MRI for further evaluation discussed possibility of arthroscopy versus total knee replacements.   Was also can consider viscosupplementation we will get preapproved.           JR Vaughn HAYDEN, RACQUEL, ATC

## 2021-08-23 NOTE — TELEPHONE ENCOUNTER
Dasia Melara at 04/17/19 1107     Status: Sign at close encounter      Faxed order for US ABD to [facility] in [place] at [phone]        Order was faxed to [facility] 21y  @ 40w4d by LMP/first trimester US MARITA 21, presents for induction of labor, post due-date. Rh +/ GBS +. Denies VB, LOF, and ctx. +FM. No complications with this pregnancy.  Last seen in the office on 21, exam was /-2.

## 2021-08-31 ENCOUNTER — DOCUMENTATION ONLY (OUTPATIENT)
Dept: ORTHOPEDIC SURGERY | Age: 61
End: 2021-08-31

## 2021-08-31 NOTE — PROGRESS NOTES
Lee Ann from [address] [place] [address] called stating the patient has an MRI scheduled, and requested office notes from 08/20. Faxed notes to [phone].

## 2021-09-13 DIAGNOSIS — M25.562 LEFT KNEE PAIN, UNSPECIFIED CHRONICITY: ICD-10-CM

## 2021-09-15 DIAGNOSIS — F41.9 ANXIETY: ICD-10-CM

## 2021-09-15 RX ORDER — DIAZEPAM 10 MG/1
TABLET ORAL
Qty: 30 TABLET | Refills: 0 | Status: SHIPPED | OUTPATIENT
Start: 2021-09-15 | End: 2021-10-15

## 2021-09-16 ENCOUNTER — OFFICE VISIT (OUTPATIENT)
Dept: ORTHOPEDIC SURGERY | Age: 61
End: 2021-09-16
Payer: COMMERCIAL

## 2021-09-16 VITALS
OXYGEN SATURATION: 96 % | TEMPERATURE: 97.7 F | WEIGHT: 263.6 LBS | HEART RATE: 77 BPM | HEIGHT: 72 IN | BODY MASS INDEX: 35.7 KG/M2

## 2021-09-16 DIAGNOSIS — G89.29 CHRONIC PAIN OF RIGHT KNEE: ICD-10-CM

## 2021-09-16 DIAGNOSIS — I89.0 LYMPHEDEMA OF BOTH LOWER EXTREMITIES: ICD-10-CM

## 2021-09-16 DIAGNOSIS — M17.11 PRIMARY OSTEOARTHRITIS OF RIGHT KNEE: ICD-10-CM

## 2021-09-16 DIAGNOSIS — G89.29 CHRONIC PAIN OF LEFT KNEE: ICD-10-CM

## 2021-09-16 DIAGNOSIS — M25.561 CHRONIC PAIN OF RIGHT KNEE: ICD-10-CM

## 2021-09-16 DIAGNOSIS — M25.562 CHRONIC PAIN OF LEFT KNEE: ICD-10-CM

## 2021-09-16 DIAGNOSIS — M17.12 PRIMARY OSTEOARTHRITIS OF LEFT KNEE: Primary | ICD-10-CM

## 2021-09-16 PROCEDURE — 99214 OFFICE O/P EST MOD 30 MIN: CPT | Performed by: ORTHOPAEDIC SURGERY

## 2021-09-16 PROCEDURE — 20610 DRAIN/INJ JOINT/BURSA W/O US: CPT | Performed by: ORTHOPAEDIC SURGERY

## 2021-09-16 RX ORDER — BETAMETHASONE SODIUM PHOSPHATE AND BETAMETHASONE ACETATE 3; 3 MG/ML; MG/ML
12 INJECTION, SUSPENSION INTRA-ARTICULAR; INTRALESIONAL; INTRAMUSCULAR; SOFT TISSUE ONCE
Status: COMPLETED | OUTPATIENT
Start: 2021-09-16 | End: 2021-09-16

## 2021-09-16 RX ADMIN — BETAMETHASONE SODIUM PHOSPHATE AND BETAMETHASONE ACETATE 12 MG: 3; 3 INJECTION, SUSPENSION INTRA-ARTICULAR; INTRALESIONAL; INTRAMUSCULAR; SOFT TISSUE at 15:43

## 2021-09-16 NOTE — PROGRESS NOTES
Patient: Sameera Guerra                MRN: [id number]       SSN: [id number]  YOB: 1960        AGE: 59 y.o. SEX: male  Body mass index is 35.75 kg/m².     PCP: Lubna Joe MD  09/16/21    Mr. Javed Jeffers presents today with bilateral knee pain little more symptomatic on the left and on the right he has known severe arthritis involving the right knee and would like an injection his left knee has been troubling him no true locking or giving way on level ground stairs kneeling all bother him my [phone] assistant quite astutely ordered an MRI for the left knee which is negative for esther meniscal tear and is positive for significant arthritis of the knee he does have some lymphedema tried some Lasix which did not help and I will get him seen by vascular for an assessment he may require some stockings the pain is moderate usually nonradicular he denies fevers or chills and otherwise been feeling well body mass index is at 36    The hips rotate nicely both knees come out straight he is in a touch of varus calf nontender Homans' sign is negative not much in the way of peripheral edema does have some lymphedema in both feet warm and well-perfused Homans' sign is negative and slight effusion in each knee well-preserved motion Julio's tests are equivocal 1+ ACL quads are intact I did review his previous x-rays which confirmed severe arthritis right knee advanced left both knees injected as per protocol    There was a discussion regarding surgery when he fails nonoperative management surgery be indicated I think the left knee will also do well with viscosupplementation following the cortisone the right knee only cortisone and then surgery when he would like he would like to have his right knee replaced probably next year both knees injected return to follow-up and 3 weeks time and will consider viscosupplementation for the left knee    REVIEW OF SYSTEMS:      CON: negative  EYE: negative ENT: negative  RESP: negative  GI:    negative   :  negative  MSK: Positive  A twelve point review of systems was completed, positives noted and all other systems were reviewed and are negative          Past Medical History:   Diagnosis Date    Anxiety     LOPEZ-7 was 11/21 from 7/11    Colon polyps 2008    Dr. Alicia Mae CVA (cerebral vascular accident) [facility]) 2006    Dr. Yanira Gaston s/p PFO closure, carotids negative    Degenerative arthritis of cervical spine 2013    on mri    Depression     PHQ-9 was 7/27 from (7/11); 10/27 from (5/15); lexapro intol; effexor briefly 2020    Diverticulosis     Dyslipidemia     intol lipitor, prava, zocor; tolerated mevacor    FHx: heart disease     GERD (gastroesophageal reflux disease)     Hearing loss 2016    right sided; s/p myringotomy Dr Anaya Piedra; now seeing diff ENT    Hemorrhoid     Hypogonadism male 2011    Dr Bell Beltran; dc'ed replacement tx 2015    Hypothyroidism     IFG (impaired fasting glucose) 2011    Left knee pain     Nephrolithiasis     Obesity (BMI 30-39.9)     peak weight 245 lbs, bmi 34.7 from 5/15; dec julia supp, med sup wt loss, bariatrics; IF 6/18 start weight 251 but did not do; did keto/low carb 6/18    MARLON (obstructive sleep apnea) 2006    Dr. Rea Thurston Osteoarthritis of both knees 2017    Peripheral neuropathy 2/13    on EMG negative serologies    PFO (patent foramen ovale) s/p closure Dr. Brandi Caicedo 2006     Primary hypertension 03/22/2018    component of white coat    Seasonal allergies        Family History   Problem Relation Age of Onset    Hypertension Mother     Cancer Mother         renal and kisney cancer    Arthritis-rheumatoid Mother     Hypertension Father     Heart Disease Father        Current Outpatient Medications   Medication Sig Dispense Refill    diazePAM (VALIUM) 10 mg tablet TAKE 1 TABLET BY MOUTH EVERY 12 HOURS AS NEEDED FOR ANXIETY .  DO NOT EXCEED 2 PER 24 HOURS 30 Tablet 0    Crestor 20 mg tablet Take 1 tablet by mouth nightly 90 Tablet 3    ergocalciferol (ERGOCALCIFEROL) 1,250 mcg (50,000 unit) capsule TAKE 1 CAPSULE BY MOUTH TWICE A WEEK 8 Capsule 1    lisinopriL (PRINIVIL, ZESTRIL) 2.5 mg tablet TAKE 1 TABLET BY MOUTH ONCE DAILY FOR HIGH BLOOD PRESSURE 90 Tablet 0    levothyroxine (SYNTHROID) 100 mcg tablet TAKE 1 TABLET BY MOUTH ONCE DAILY BEFORE BREAKFAST 90 Tab 3    venlafaxine-SR (EFFEXOR-XR) 150 mg capsule Take 1 Cap by mouth daily. 30 Cap 5    OTHER daily. CBD Oil      fluticasone propionate (FLONASE) 50 mcg/actuation nasal spray USE 2 SPRAY(S) IN EACH NOSTRIL ONCE DAILY 3 Bottle 3    aspirin delayed-release 81 mg tablet Take 81 mg by mouth daily.  ciprofloxacin 0.3% -fluocinolone 0.025% (OTOVEL) 0.3-0.025 % (0.25 mL) otic solution Administer 0.25 mL into each ear two (2) times a day.  (Patient not taking: Reported on 7/19/2021) 3.5 Each 1       Allergies   Allergen Reactions    Amoxicillin (Bulk) Unknown (comments)     Rash around collar area    Codeine Other (comments)    Cymbalta [Duloxetine] Other (comments)     Gi upset    Erythromycin Diarrhea    Lexapro [Escitalopram Oxalate] Other (comments)     Gi upset      Lipitor [Atorvastatin] Myalgia    Pcn [Penicillins] Rash    Pravastatin Myalgia    Septra [Sulfamethoprim Ds] Unknown (comments)    Simvastatin Myalgia       Past Surgical History:   Procedure Laterality Date    HX COLONOSCOPY      Dr. Shalom Kelley 2008 polyp; Dr Jose A Henderson 2/3/12 neg    HX HERNIA REPAIR      HX KNEE ARTHROSCOPY Left 3-4 yrs ago    [facility] UNLIST  5/16    ETT negative    AL CARDIAC SURG PROCEDURE UNLIST  01/2018    echo nl lv, ef 60%, mild dd, mild/mod lvh, tr AR, aortic root 4cm, well seated pfo closure device Dr Chai Ochoa History     Socioeconomic History    Marital status:      Spouse name: Not on file    Number of children: 2    Years of education: Not on file    Highest education level: Not on file   Occupational History    Occupation: auto parts worker   Tobacco Use    Smoking status: Never Smoker    Smokeless tobacco: Never Used   Substance and Sexual Activity    Alcohol use: No    Drug use: No    Sexual activity: Yes     Partners: Female   Other Topics Concern    Not on file   Social History Narrative    Not on file     Social Determinants of Health     Financial Resource Strain:     Difficulty of Paying Living Expenses:    Food Insecurity:     Worried About Running Out of Food in the Last Year:     [address] in the Last Year:    Transportation Needs:     Lack of Transportation (Medical):  Lack of Transportation (Non-Medical):    Physical Activity:     Days of Exercise per Week:     Minutes of Exercise per Session:    Stress:     Feeling of Stress :    Social Connections:     Frequency of Communication with Friends and Family:     Frequency of Social Gatherings with Friends and Family:     Attends Religion Services:     Active Member of Clubs or Organizations:     Attends Club or Organization Meetings:     Marital Status:    Intimate Partner Violence:     Fear of Current or Ex-Partner:     Emotionally Abused:     Physically Abused:     Sexually Abused:        Visit Vitals  Pulse 77   Temp 97.7 °F (36.5 °C) (Skin)   Ht 6' (1.829 m)   Wt 263 lb 9.6 oz (119.6 kg)   SpO2 96%   BMI 35.75 kg/m²         PHYSICAL EXAMINATION:  GENERAL: Alert and oriented x3, in no acute distress, well-developed, well-nourished, afebrile. HEART: No JVD. EYES: No scleral icterus   NECK: No significant lymphadenopathy   LUNGS: No respiratory compromise or indrawing  ABDOMEN: Soft, non-tender, non-distended. Note: This note was completed using voice recognition software. Any typographical/name errors or mistakes are unintentional.    Electronically signed by: MD JENNIFER Lucio, Isabel Zambrano M.D., have reviewed the history, physical, and have updated the allergic reactions for Fashism. TIME OUT performed immediately prior to the start of procedure:  Lia RODRIGUEZ M.D., have performed the following reviews on Fashism prior to the start of the procedure:    - Patient was identified by name and date of birth  - Agreement on procedure being performed was verified  - Risks and benefits explained to the patient  - Patient was positioned for comfort  - Consent was signed and verified  - Patient was advised regarding risks of bruising, bleeding, infection and pain    Time: 3:40 PM     Body Part: intra-articular injection of bilateral knees. Medication and Dose: Each knee injected with 1mL Celestone preparation, i.e. 6 mg, and 3 mL 1% lidocaine    Date of Procedure: 09/16/21    PROCEDURE PERFORMED BY : Anni Stevenson M.D., [facility])    Provider Assisted by: Tracy Bragg    Patient assisted by: self    Patient tolerated procedure well with no complications

## 2021-10-13 DIAGNOSIS — F41.9 ANXIETY: ICD-10-CM

## 2021-10-15 RX ORDER — DIAZEPAM 10 MG/1
TABLET ORAL
Qty: 30 TABLET | Refills: 0 | Status: SHIPPED | OUTPATIENT
Start: 2021-10-15 | End: 2021-11-15

## 2021-11-15 DIAGNOSIS — F41.9 ANXIETY: ICD-10-CM

## 2021-11-15 RX ORDER — DIAZEPAM 10 MG/1
TABLET ORAL
Qty: 30 TABLET | Refills: 0 | Status: SHIPPED | OUTPATIENT
Start: 2021-11-15 | End: 2021-12-14

## 2021-12-06 DIAGNOSIS — E55.9 HYPOVITAMINOSIS D: ICD-10-CM

## 2021-12-08 RX ORDER — ERGOCALCIFEROL 1.25 MG/1
CAPSULE ORAL
Qty: 8 CAPSULE | Refills: 0 | Status: SHIPPED | OUTPATIENT
Start: 2021-12-08

## 2021-12-12 DIAGNOSIS — F41.9 ANXIETY: ICD-10-CM

## 2021-12-14 RX ORDER — DIAZEPAM 10 MG/1
TABLET ORAL
Qty: 30 TABLET | Refills: 0 | Status: SHIPPED | OUTPATIENT
Start: 2021-12-14 | End: 2022-01-07

## 2022-01-06 DIAGNOSIS — F41.9 ANXIETY: ICD-10-CM

## 2022-01-07 RX ORDER — DIAZEPAM 10 MG/1
TABLET ORAL
Qty: 30 TABLET | Refills: 0 | Status: SHIPPED | OUTPATIENT
Start: 2022-01-07 | End: 2022-02-07

## 2022-01-17 DIAGNOSIS — E55.9 HYPOVITAMINOSIS D: ICD-10-CM

## 2022-01-19 NOTE — TELEPHONE ENCOUNTER
pls call    Vit d has not been check in some time - he's taking high dose supp  Needs to be rechecked before we refill

## 2022-01-20 NOTE — TELEPHONE ENCOUNTER
Patient is aware he will need his Vit D level checked before he can get a refill on Vit D. Patient states he will call back next week to schedule a lab appointment.

## 2022-01-28 DIAGNOSIS — Z00.00 PHYSICAL EXAM: ICD-10-CM

## 2022-01-31 RX ORDER — ERGOCALCIFEROL 1.25 MG/1
CAPSULE ORAL
Qty: 8 CAPSULE | Refills: 0 | OUTPATIENT
Start: 2022-01-31

## 2022-02-07 DIAGNOSIS — F41.9 ANXIETY: ICD-10-CM

## 2022-02-07 RX ORDER — DIAZEPAM 10 MG/1
TABLET ORAL
Qty: 30 TABLET | Refills: 0 | Status: SHIPPED | OUTPATIENT
Start: 2022-02-07 | End: 2022-03-07

## 2022-03-07 DIAGNOSIS — F41.9 ANXIETY: ICD-10-CM

## 2022-03-07 RX ORDER — DIAZEPAM 10 MG/1
TABLET ORAL
Qty: 30 TABLET | Refills: 0 | Status: SHIPPED | OUTPATIENT
Start: 2022-03-07 | End: 2022-04-05

## 2022-03-18 PROBLEM — Z87.74 S/P PERCUTANEOUS PATENT FORAMEN OVALE CLOSURE: Status: ACTIVE | Noted: 2018-03-29

## 2022-03-19 PROBLEM — M15.0 PRIMARY OSTEOARTHRITIS INVOLVING MULTIPLE JOINTS: Status: ACTIVE | Noted: 2017-06-19

## 2022-03-19 PROBLEM — R73.01 IFG (IMPAIRED FASTING GLUCOSE): Status: ACTIVE | Noted: 2017-01-16

## 2022-03-19 PROBLEM — M15.9 PRIMARY OSTEOARTHRITIS INVOLVING MULTIPLE JOINTS: Status: ACTIVE | Noted: 2017-06-19

## 2022-03-19 PROBLEM — I10 PRIMARY HYPERTENSION: Status: ACTIVE | Noted: 2018-03-22

## 2022-03-20 PROBLEM — E66.9 OBESITY (BMI 30.0-34.9): Status: ACTIVE | Noted: 2021-07-28

## 2022-03-20 PROBLEM — E66.811 OBESITY (BMI 30.0-34.9): Status: ACTIVE | Noted: 2021-07-28

## 2022-03-22 ENCOUNTER — TELEPHONE (OUTPATIENT)
Dept: INTERNAL MEDICINE CLINIC | Age: 62
End: 2022-03-22

## 2022-03-22 DIAGNOSIS — U07.1 COVID-19 VIRUS INFECTION: Primary | ICD-10-CM

## 2022-03-22 NOTE — TELEPHONE ENCOUNTER
Patient's wife called, states that patient tested positive for covid this morning. His symptoms started yesterday, he is achy, nasal congested, hoarse, and coughing. Also starting yesterday, he has had a bunch of floaters in his vision in his right eye that are now there constantly. Also states that he has bright line across his vision when he blinks, almost looks like a piece of hair. She states that there is no eye pain, but they were concerned about his vision. They are requesting to speak with a nurse or Dr. Amy Cavazos. Patient can be reached at [phone] and his wife can be reached at [phone].     Please advise, thank you

## 2022-03-22 NOTE — TELEPHONE ENCOUNTER
We can call in paxlovid    Hold crestor while taking paxlovid; restart once finished    Not sure what to say about the floaters - would call the eye doc if he has one  If not, refer to dr yusuf's group

## 2022-03-23 ENCOUNTER — TELEPHONE (OUTPATIENT)
Dept: INTERNAL MEDICINE CLINIC | Age: 62
End: 2022-03-23

## 2022-03-23 NOTE — TELEPHONE ENCOUNTER
Patient used miralax once a night for three nights in a row, and took the dulcolax today in the morning (two), and he states normally the dulcolax produces a bowel movement within about 8 hours, but no bowel movement yet. He stated that he may not be properly hydrated, and has not had a colonoscopy within the year, in fact, it may have been over 5 years ago. Patient was advised to drink a minimum of 64 fl oz daily and that he could explore OTC options with local pharmacy. Patient verbalized understanding, and was wondering if the keto diet could be the cause. Patient was advised to monitor his fiber intake in regards to this and that his message would be forwarded to MD for review and input.

## 2022-03-23 NOTE — TELEPHONE ENCOUNTER
Pt called said he stared a keto diet on 02/20 since then he has been constipated he said he has tried Miralax   (didn't help) and now is using Duphalac ,he said that helps but its temporary he does not want to keep having to take it . He is asking for any suggestion that would help .    Please advise

## 2022-03-23 NOTE — TELEPHONE ENCOUNTER
pls confirm the dosing he was doing with miralax and dulcolax (daily, bid, etc)  Is he hydrating well? Min 64oz fluid  Has he had colo within the last year?

## 2022-04-05 DIAGNOSIS — F41.9 ANXIETY: ICD-10-CM

## 2022-04-05 RX ORDER — DIAZEPAM 10 MG/1
TABLET ORAL
Qty: 30 TABLET | Refills: 0 | Status: SHIPPED | OUTPATIENT
Start: 2022-04-05 | End: 2022-05-06

## 2022-05-06 DIAGNOSIS — F41.9 ANXIETY: ICD-10-CM

## 2022-05-06 RX ORDER — DIAZEPAM 10 MG/1
TABLET ORAL
Qty: 30 TABLET | Refills: 0 | Status: SHIPPED | OUTPATIENT
Start: 2022-05-06 | End: 2022-06-07

## 2022-06-20 ENCOUNTER — TELEPHONE (OUTPATIENT)
Dept: ORTHOPEDIC SURGERY | Age: 62
End: 2022-06-20

## 2022-06-20 NOTE — TELEPHONE ENCOUNTER
Patient called and is requesting a copy of his xray results from his office visit with Dr. Avila Ramirez on 07/19/2021. I advised he will need to come fill out a release form and he says his wife Adithya Ornelas) will be coming by some time this week because he has to work. Patient can be reached at [phone].

## 2022-06-21 NOTE — TELEPHONE ENCOUNTER
Patient is asking if we can put the xrays requested below on a disc. Please advise once ready for , [phone].

## 2022-06-23 DIAGNOSIS — M25.562 PAIN IN BOTH KNEES, UNSPECIFIED CHRONICITY: Primary | ICD-10-CM

## 2022-06-23 DIAGNOSIS — M25.561 PAIN IN BOTH KNEES, UNSPECIFIED CHRONICITY: Primary | ICD-10-CM

## 2022-06-24 ENCOUNTER — OFFICE VISIT (OUTPATIENT)
Dept: ORTHOPEDIC SURGERY | Age: 62
End: 2022-06-24
Payer: COMMERCIAL

## 2022-06-24 VITALS — HEIGHT: 73 IN | BODY MASS INDEX: 31.94 KG/M2 | WEIGHT: 241 LBS

## 2022-06-24 DIAGNOSIS — M17.0 OSTEOARTHRITIS OF BOTH KNEES, UNSPECIFIED OSTEOARTHRITIS TYPE: ICD-10-CM

## 2022-06-24 DIAGNOSIS — M17.0 OSTEOARTHRITIS OF BOTH KNEES, UNSPECIFIED OSTEOARTHRITIS TYPE: Primary | ICD-10-CM

## 2022-06-24 DIAGNOSIS — M25.561 PAIN IN BOTH KNEES, UNSPECIFIED CHRONICITY: ICD-10-CM

## 2022-06-24 DIAGNOSIS — M25.562 PAIN IN BOTH KNEES, UNSPECIFIED CHRONICITY: ICD-10-CM

## 2022-06-24 PROBLEM — I10 ESSENTIAL HYPERTENSION: Status: ACTIVE | Noted: 2018-01-12

## 2022-06-24 PROBLEM — J32.9 CHRONIC SINUSITIS: Status: ACTIVE | Noted: 2018-01-12

## 2022-06-24 PROBLEM — E66.811 OBESITY (BMI 30.0-34.9): Status: ACTIVE | Noted: 2017-10-18

## 2022-06-24 PROBLEM — R60.0 LOCALIZED EDEMA: Status: ACTIVE | Noted: 2018-01-12

## 2022-06-24 PROBLEM — Q21.12 PFO (PATENT FORAMEN OVALE): Status: ACTIVE | Noted: 2018-01-12

## 2022-06-24 PROBLEM — S83.232A COMPLEX TEAR OF MEDIAL MENISCUS OF LEFT KNEE AS CURRENT INJURY: Status: ACTIVE | Noted: 2017-10-18

## 2022-06-24 PROBLEM — E78.00 PURE HYPERCHOLESTEROLEMIA: Status: ACTIVE | Noted: 2018-01-12

## 2022-06-24 PROBLEM — E66.9 OBESITY (BMI 30.0-34.9): Status: ACTIVE | Noted: 2017-10-18

## 2022-06-24 PROBLEM — M25.569 KNEE PAIN: Status: ACTIVE | Noted: 2017-10-18

## 2022-06-24 PROBLEM — M17.12 PRIMARY OSTEOARTHRITIS OF LEFT KNEE: Status: ACTIVE | Noted: 2018-01-23

## 2022-06-24 PROCEDURE — 99204 OFFICE O/P NEW MOD 45 MIN: CPT | Performed by: ORTHOPAEDIC SURGERY

## 2022-06-24 RX ORDER — ACETAMINOPHEN 500 MG
500 TABLET ORAL
COMMUNITY

## 2022-06-24 NOTE — PROGRESS NOTES
Name: Jake Patel    : 1960     Service Dept: [address] [facility]    Chief Complaint   Patient presents with    Knee Pain        Visit Vitals  Ht 6' 1\" (1.854 m)   Wt 241 lb (109.3 kg)   BMI 31.80 kg/m²        Allergies   Allergen Reactions    Amoxicillin (Bulk) Unknown (comments)     Rash around collar area    Codeine Other (comments)     Other reaction(s): other/intolerance    Cymbalta [Duloxetine] Other (comments)     Gi upset    Erythromycin Diarrhea    Lexapro [Escitalopram Oxalate] Other (comments)     Gi upset      Lipitor [Atorvastatin] Myalgia    Penicillins Rash and Itching    Pravastatin Myalgia    Septra [Sulfamethoprim Ds] Unknown (comments)    Simvastatin Myalgia        Current Outpatient Medications   Medication Sig Dispense Refill    acetaminophen (TYLENOL) 500 mg tablet Take 500 mg by mouth every four (4) hours as needed.  diazePAM (VALIUM) 10 mg tablet TAKE 1 TABLET BY MOUTH EVERY 12 HOURS AS NEEDED FOR ANXIETY . DO NOT EXCEED 2 TABLETS PER 24 HOURS 30 Tablet 1    nirmatrelvir-ritonavir (PAXLOVID) 150 mg x 2- 100 mg tablet Take 3 tabs twice daily for 5 days 1 Box 0    fluticasone propionate (FLONASE) 50 mcg/actuation nasal spray Use 2 spray(s) in each nostril once daily 48 g 3    ergocalciferol (ERGOCALCIFEROL) 1,250 mcg (50,000 unit) capsule TAKE 1 CAPSULE BY MOUTH TWICE A WEEK 8 Capsule 0    lisinopriL (PRINIVIL, ZESTRIL) 2.5 mg tablet TAKE 1 TABLET BY MOUTH ONCE DAILY FOR HIGH BLOOD PRESSURE 90 Tablet 3    Crestor 20 mg tablet Take 1 tablet by mouth nightly 90 Tablet 3    levothyroxine (SYNTHROID) 100 mcg tablet TAKE 1 TABLET BY MOUTH ONCE DAILY BEFORE BREAKFAST 90 Tab 3    venlafaxine-SR (EFFEXOR-XR) 150 mg capsule Take 1 Cap by mouth daily. 30 Cap 5    OTHER daily. CBD Oil      ciprofloxacin 0.3% -fluocinolone 0.025% (OTOVEL) 0.3-0.025 % (0.25 mL) otic solution Administer 0.25 mL into each ear two (2) times a day.  3.5 Each 1    aspirin delayed-release 81 mg tablet Take 81 mg by mouth daily. Patient Active Problem List   Diagnosis Code    CVA Dr. Lyndsey Grey 2006 I63.9    MARLON (obstructive sleep apnea) G47.33    Anxiety and depression 2011 F41.9    Colon polyps and diverticulosis Dr. Liya Gross 2008 K63.5    Hypogonadism Dr. Nora Khan 2011 E29.1    Dyslipidemia E78.5    Hypovitaminosis D E55.9    Neuropathy G62.9    Hypothyroidism due to acquired atrophy of thyroid E03.4    Gastroesophageal reflux disease without esophagitis K21.9    IFG (impaired fasting glucose) R73.01    Primary osteoarthritis involving multiple joints Dr Selena Nova M15.9    Essential hypertension I10    S/P percutaneous patent foramen ovale closure Z87.74    Obesity (BMI 30.0-34. 9) E66.9    Primary osteoarthritis of left knee M17.12    Primary osteoarthritis of both knees M17.0    PFO (patent foramen ovale) Q21.1    Localized edema R60.0    Knee pain M25.569    Complex tear of medial meniscus of left knee as current injury S83.232A    Chronic sinusitis J32.9    Pure hypercholesterolemia E78.00      Family History   Problem Relation Age of Onset    Hypertension Mother     Cancer Mother         renal and kisney cancer   [address] [place] [address] Arthritis-rheumatoid Mother     Hypertension Father     Heart Disease Father       Social History     Socioeconomic History    Marital status:     Number of children: 2   Occupational History    Occupation: auto parts worker   Tobacco Use    Smoking status: Never Smoker    Smokeless tobacco: Never Used   Substance and Sexual Activity    Alcohol use: No    Drug use: No    Sexual activity: Yes     Partners: Female      Past Surgical History:   Procedure Laterality Date    HX COLONOSCOPY      Dr. Liya Gross 2008 polyp; Dr Hai Torrez 2/3/12 neg    HX HERNIA REPAIR      HX KNEE ARTHROSCOPY Left 3-4 yrs ago    [address] [place] [address]    HX TONSILLECTOMY     [facility]  5/16    ETT negative    NE CARDIAC SURG PROCEDURE UNLIST  01/2018    echo nl lv, ef 60%, mild dd, mild/mod lvh, tr AR, aortic root 4cm, well seated pfo closure device Dr Irene Ohara      Past Medical History:   Diagnosis Date    Anxiety     LOPEZ-7 was 11/21 from 7/11    Colon polyps 2008    Dr. Ny Cervantes CVA (cerebral vascular accident) [facility]) 2006    Dr. Moser Fearing s/p PFO closure, carotids negative    Degenerative arthritis of cervical spine 2013    on mri    Depression     PHQ-9 was 7/27 from (7/11); 10/27 from (5/15); lexapro intol; effexor briefly 2020    Diverticulosis     Dyslipidemia     intol lipitor, prava, zocor; tolerated mevacor    FHx: heart disease     GERD (gastroesophageal reflux disease)     Hearing loss 2016    right sided; s/p myringotomy Dr Moisés Montejo; now seeing diff ENT    Hemorrhoid     Hypogonadism male 2011    Dr Ferguson; dc'ed replacement tx 2015    Hypothyroidism     IFG (impaired fasting glucose) 2011    Left knee pain     Nephrolithiasis     Obesity (BMI 30-39.9)     peak weight 245 lbs, bmi 34.7 from 5/15; dec julia supp, med sup wt loss, bariatrics; IF 6/18 start weight 251 but did not do; did keto/low carb 6/18    MARLON (obstructive sleep apnea) 2006    Dr. Jessica Johnson Osteoarthritis of both knees 2017    Peripheral neuropathy 2/13    on EMG negative serologies    PFO (patent foramen ovale) s/p closure Dr. Yuridia Browne 2006     Primary hypertension 03/22/2018    component of white coat    Seasonal allergies         I have reviewed and agree with [address] [place] [address] Nw and ROS and intake form in chart and the record furthermore I have reviewed prior medical record(s) regarding this patients care during this appointment. Review of Systems:   Patient is a pleasant appearing individual, appropriately dressed, well hydrated, well nourished, who is alert, appropriately oriented for age, and in no acute distress with a normal gait and normal affect who does not appear to be in any significant pain.    Physical Exam:  Left Knee -Decrease range of motion with flexion, Knee arc of greater than 50 degrees, Some crepitation, Grossly neurovascularly intact, Good cap refill, No skin lesion, Moderate swelling, No gross instability, Some quadriceps weakness Kellgren and Romulo at least grade 3    Right Knee -Decrease range of motion with flexion, Some crepitation, Grossly neurovascularly intact, Good cap refill, No skin lesion, Moderate swelling, No gross instability, Some quadriceps weaknessKellgren and Romulo at least grade 3   Encounter Diagnoses     ICD-10-CM ICD-9-CM   1. Osteoarthritis of both knees, unspecified osteoarthritis type  M17.0 715.96   2. Pain in both knees, unspecified chronicity  M25.561 719.46    M25.562        HPI:  The patient is here with a chief complaint of bilateral knee pain, throbbing, burning pain, progressively getting worse. Pain is 8/10. X-rays are positive for OA both knees. Assessment/Plan:  Plan will be for left total knee replacement, general medical clearance and we will get vascular clearance. She has got a history of blood clots, but is not on any blood thinners. Of note, he might call back to have the right one done in March, some time end of March and we may accommodate that as well, but for right now we will get him set up for his left knee. As part of continued conservative pain management options the patient was advised to utilize Tylenol or OTC NSAIDS as long as it is not medically contraindicated. Return to Office: Follow-up and Dispositions    · Return for schedule for surgery. Scribed by Mary Caballero LPN as dictated by [facility] ADELINE Waller MD.  Documentation True and Accepted Magdaleno Waller MD

## 2022-06-24 NOTE — LETTER
6/27/2022    Patient: Chanel Joe   YOB: 1960   Date of Visit: 6/24/2022     Talha Green MD  [address] [facility]  [address] [place] [address]    Dear Talha Green MD,      Thank you for referring Mr. Emeterio Ulloa to [address] [place] [address] for evaluation. My notes for this consultation are attached. If you have questions, please do not hesitate to call me. I look forward to following your patient along with you.       Sincerely,    Yovanny Ramirez MD

## 2022-06-24 NOTE — PATIENT INSTRUCTIONS
Knee Arthritis: Care Instructions  Your Care Instructions     Knee arthritis is a breakdown of the cartilage that cushions your knee joint. When the cartilage wears down, your bones rub against each other. This causes pain and stiffness. Knee arthritis tends to get worse with time. Treatment for knee arthritis involves reducing pain, making the leg muscles stronger, and staying at a healthy body weight. The treatment usually does not improve the health of the cartilage, but it can reduce pain and improve how well your knee works. You can take simple measures to protect your knee joints, ease your pain, and help you stay active. Follow-up care is a key part of your treatment and safety. Be sure to make and go to all appointments, and call your doctor if you are having problems. It's also a good idea to know your test results and keep a list of the medicines you take. How can you care for yourself at home? · Know that knee arthritis will cause more pain on some days than on others. · Stay at a healthy weight. Lose weight if you are overweight. When you stand up, the pressure on your knees from every pound of body weight is multiplied four times. So if you lose 10 pounds, you will reduce the pressure on your knees by 40 pounds. · Talk to your doctor or physical therapist about exercises that will help ease joint pain. ? Stretch to help prevent stiffness and to prevent injury before you exercise. You may enjoy gentle forms of yoga to help keep your knee joints and muscles flexible. ? Walk instead of jog.  ? Ride a bike. This makes your thigh muscles stronger and takes pressure off your knee. ? Wear well-fitting and comfortable shoes. ? Exercise in chest-deep water. This can help you exercise longer with less pain. ? Avoid exercises that include squatting or kneeling. They can put a lot of strain on your knees.   ? Talk to your doctor to make sure that the exercise you do is not making the arthritis worse.  · Do not sit for long periods of time. Try to walk once in a while to keep your knee from getting stiff. · Ask your doctor or physical therapist whether shoe inserts may reduce your arthritis pain. · If you can afford it, get new athletic shoes at least every year. This can help reduce the strain on your knees. · Use a device to help you do everyday activities. ? A cane or walking stick can help you keep your balance when you walk. Hold the cane or walking stick in the hand opposite the painful knee. ? If you feel like you may fall when you walk, try using crutches or a front-wheeled walker. These can prevent falls that could cause more damage to your knee. ? A knee brace may help keep your knee stable and prevent pain. ? You also can use other things to make life easier, such as a higher toilet seat and handrails in the bathtub or shower. · Take pain medicines exactly as directed. ? Do not wait until you are in severe pain. You will get better results if you take it sooner. ? If you are not taking a prescription pain medicine, take an over-the-counter medicine such as acetaminophen (Tylenol), ibuprofen (Advil, Motrin), or naproxen (Aleve). Read and follow all instructions on the label. ? Do not take two or more pain medicines at the same time unless the doctor told you to. Many pain medicines have acetaminophen, which is Tylenol. Too much acetaminophen (Tylenol) can be harmful. ? Tell your doctor if you take a blood thinner, have diabetes, or have allergies to shellfish. · Ask your doctor if you might benefit from a shot of steroid medicine into your knee. This may provide pain relief for several months. · Many people take the supplements glucosamine and chondroitin for osteoarthritis. Some people feel they help, but the medical research does not show that they work. Talk to your doctor before you take these supplements. When should you call for help?    Call your doctor now or seek immediate medical care if:    · You have sudden swelling, warmth, or pain in your knee.     · You have knee pain and a fever or rash.     · You have such bad pain that you cannot use your knee. Watch closely for changes in your health, and be sure to contact your doctor if you have any problems. Where can you learn more? Go to http://www.gray.com/  Enter W187 in the search box to learn more about \"Knee Arthritis: Care Instructions. \"  Current as of: December 20, 2021               Content Version: 13.2  © 4233-2422 [employer]. Care instructions adapted under license by [employer] (which disclaims liability or warranty for this information). If you have questions about a medical condition or this instruction, always ask your healthcare professional. [address] any warranty or liability for your use of this information.

## 2022-07-21 ENCOUNTER — APPOINTMENT (OUTPATIENT)
Dept: INTERNAL MEDICINE CLINIC | Age: 62
End: 2022-07-21

## 2022-07-21 DIAGNOSIS — E55.9 HYPOVITAMINOSIS D: ICD-10-CM

## 2022-07-22 LAB
A-G RATIO,AGRAT: 2.2 RATIO (ref 1.1–2.6)
ALBUMIN SERPL-MCNC: 4.6 G/DL (ref 3.5–5)
ALP SERPL-CCNC: 76 U/L (ref 40–125)
ALT SERPL-CCNC: 16 U/L (ref 5–40)
ANION GAP SERPL CALC-SCNC: 12 MMOL/L (ref 3–15)
AST SERPL W P-5'-P-CCNC: 15 U/L (ref 10–37)
AVG GLU, 10930: 121 MG/DL (ref 91–123)
BILIRUB SERPL-MCNC: 0.3 MG/DL (ref 0.2–1.2)
BUN SERPL-MCNC: 16 MG/DL (ref 6–22)
CALCIUM SERPL-MCNC: 9.2 MG/DL (ref 8.4–10.5)
CHLORIDE SERPL-SCNC: 106 MMOL/L (ref 98–110)
CHOLEST SERPL-MCNC: 108 MG/DL (ref 110–200)
CO2 SERPL-SCNC: 25 MMOL/L (ref 20–32)
CREAT SERPL-MCNC: 0.7 MG/DL (ref 0.8–1.6)
ERYTHROCYTE [DISTWIDTH] IN BLOOD BY AUTOMATED COUNT: 13.3 % (ref 10–15.5)
GLOBULIN,GLOB: 2.1 G/DL (ref 2–4)
GLOMERULAR FILTRATION RATE: >60 ML/MIN/1.73 SQ.M.
GLUCOSE SERPL-MCNC: 99 MG/DL (ref 70–99)
HBA1C MFR BLD HPLC: 5.8 % (ref 4.8–5.6)
HCT VFR BLD AUTO: 44.2 % (ref 39.3–51.6)
HDLC SERPL-MCNC: 2.3 MG/DL (ref 0–5)
HDLC SERPL-MCNC: 47 MG/DL
HGB BLD-MCNC: 13 G/DL (ref 13.1–17.2)
LDL/HDL RATIO,LDHD: 1.1
LDLC SERPL CALC-MCNC: 52 MG/DL (ref 50–99)
MCH RBC QN AUTO: 29 PG (ref 26–34)
MCHC RBC AUTO-ENTMCNC: 29 G/DL (ref 31–36)
MCV RBC AUTO: 100 FL (ref 80–95)
NON-HDL CHOLESTEROL, 011976: 61 MG/DL
PLATELET # BLD AUTO: 153 K/UL (ref 140–440)
PMV BLD AUTO: 12.2 FL (ref 9–13)
POTASSIUM SERPL-SCNC: 4.5 MMOL/L (ref 3.5–5.5)
PROT SERPL-MCNC: 6.7 G/DL (ref 6.2–8.1)
PSA SERPL-MCNC: 1.58 NG/ML
RBC # BLD AUTO: 4.44 M/UL (ref 3.8–5.8)
SODIUM SERPL-SCNC: 143 MMOL/L (ref 133–145)
TRIGL SERPL-MCNC: 46 MG/DL (ref 40–149)
VLDLC SERPL CALC-MCNC: 9 MG/DL (ref 8–30)
WBC # BLD AUTO: 4.5 K/UL (ref 4–11)

## 2022-07-25 DIAGNOSIS — E78.5 DYSLIPIDEMIA: ICD-10-CM

## 2022-07-25 RX ORDER — ROSUVASTATIN CALCIUM 20 MG/1
TABLET, FILM COATED ORAL
Qty: 90 TABLET | Refills: 0 | Status: SHIPPED | OUTPATIENT
Start: 2022-07-25 | End: 2022-08-01 | Stop reason: SDUPTHER

## 2022-07-25 NOTE — PROGRESS NOTES
57 y.o. WHITE/NON- male who presents for RPE    No cardiovascular complaints. Exercise is limited by the knees although he tries to be active    He is planning on having bilat TKR by Dr Elizabeth Forrest early next year    The sleep apnea is controlled on cpap, no daytime somnolence, headaches, uncontrolled bp      He continues to have intermittent 'choking spells' and had declined getting it worked up last year but wanting to do so now. No wt loss, bleeding, other consitutional complaints or sx referable to the thyroid.   He is due for colo as well    He has occasional LUTS sx mostly nocturia and dec strength of stream.  Not interested in meds     Past Medical History:   Diagnosis Date    Allergic rhinitis, seasonal     Anxiety     LOPEZ-7 was 11/21 from 7/11    Colon polyps 2008    Dr. Kizzy Wood    CVA (cerebral vascular accident) [facility]) 2006    Dr. Judy Snyder s/p PFO closure, carotids negative    Degenerative arthritis of cervical spine 2013    on mri    Depression     PHQ-9 was 7/27 from (7/11); 10/27 from (5/15); lexapro intol; effexor briefly 2020    Diverticulosis     Dyslipidemia     intol lipitor, prava, zocor; tolerated mevacor    FHx: heart disease     GERD (gastroesophageal reflux disease)     Hearing loss 2016    right sided; s/p myringotomy Dr Janeen Coe; now seeing diff ENT    Hemorrhoid     Hypertension 03/22/2018    component of white coat    Hypogonadism male 2011    Dr Francia Harrington; dc'ed replacement tx 2015    Hypothyroidism     IFG (impaired fasting glucose) 2011    Nephrolithiasis     Obesity (BMI 30-39.9)     peak weight 245 lbs, bmi 34.7 from 5/15; dec julia supp, med sup wt loss, bariatrics; IF 6/18 start weight 251 but did not do; did keto/low carb 6/18    MARLON (obstructive sleep apnea) 2006    Dr. Elisabet Calvillo    Osteoarthritis of both knees 2017    Peripheral neuropathy 02/2013    on EMG negative serologies    PFO (patent foramen ovale) s/p closure Dr. Arabella Stark 2006      Past Surgical History:   Procedure Laterality Date    HX COLONOSCOPY      Dr. Nayeli Marr 2008 polyp; Dr Carly Cody 2/3/12 neg    HX HERNIA REPAIR      HX KNEE ARTHROSCOPY Left 3-4 yrs ago    [address]     [facility] SURG PROCEDURE UNLIST  5/16    ETT negative    [facility] CARDIAC SURG PROCEDURE UNLIST  01/2018    echo nl lv, ef 60%, mild dd, mild/mod lvh, tr AR, aortic root 4cm, well seated pfo closure device Dr Trude Holstein     Social History     Socioeconomic History    Marital status:      Spouse name: Not on file    Number of children: 2    Years of education: Not on file    Highest education level: Not on file   Occupational History    Occupation: auto parts worker   Tobacco Use    Smoking status: Never    Smokeless tobacco: Never   Substance and Sexual Activity    Alcohol use: No    Drug use: No    Sexual activity: Yes     Partners: Female   Other Topics Concern    Not on file   Social History Narrative    Not on file     Social Determinants of Health     Financial Resource Strain: Not on file   Food Insecurity: Not on file   Transportation Needs: Not on file   Physical Activity: Not on file   Stress: Not on file   Social Connections: Not on file   Intimate Partner Violence: Not on file   Housing Stability: Not on file     Allergies   Allergen Reactions    Amoxicillin (Bulk) Unknown (comments)     Rash around collar area    Codeine Other (comments)     Other reaction(s): other/intolerance    Cymbalta [Duloxetine] Other (comments)     Gi upset    Erythromycin Diarrhea    Lexapro [Escitalopram Oxalate] Other (comments)     Gi upset      Lipitor [Atorvastatin] Myalgia    Penicillins Rash and Itching    Pravastatin Myalgia    Septra [Sulfamethoprim Ds] Unknown (comments)    Simvastatin Myalgia     Current Outpatient Medications   Medication Sig    levothyroxine (SYNTHROID) 100 mcg tablet TAKE 1 TABLET BY MOUTH ONCE DAILY BEFORE BREAKFAST    Crestor 20 mg tablet Take 1 tablet by mouth nightly    acetaminophen (TYLENOL) 500 mg tablet Take 500 mg by mouth every four (4) hours as needed. diazePAM (VALIUM) 10 mg tablet TAKE 1 TABLET BY MOUTH EVERY 12 HOURS AS NEEDED FOR ANXIETY . DO NOT EXCEED 2 TABLETS PER 24 HOURS    fluticasone propionate (FLONASE) 50 mcg/actuation nasal spray Use 2 spray(s) in each nostril once daily    ergocalciferol (ERGOCALCIFEROL) 1,250 mcg (50,000 unit) capsule TAKE 1 CAPSULE BY MOUTH TWICE A WEEK    lisinopriL (PRINIVIL, ZESTRIL) 2.5 mg tablet TAKE 1 TABLET BY MOUTH ONCE DAILY FOR HIGH BLOOD PRESSURE    venlafaxine-SR (EFFEXOR-XR) 150 mg capsule Take 1 Cap by mouth daily. ciprofloxacin 0.3% -fluocinolone 0.025% (OTOVEL) 0.3-0.025 % (0.25 mL) otic solution Administer 0.25 mL into each ear two (2) times a day. aspirin delayed-release 81 mg tablet Take 81 mg by mouth daily. No current facility-administered medications for this visit. REVIEW OF SYSTEMS: colo 2/12 Dr Nehemiah Gustafson  Ophtho - no vision change or eye pain  Oral - no mouth pain, tongue or tooth problems  Ears - no hearing loss, ear pain, fullness, no swallowing problems  Cardiac - no CP, PND, orthopnea, edema, palpitations or syncope  Chest - no breast masses  Resp - no wheezing, chronic coughing, dyspnea  GI - no heartburn, nausea, vomiting, change in bowel habits, bleeding, hemorrhoids  Urinary - no dysuria, hematuria, flank pain, urgency, frequency  Neuro - no focal weakness, numbness, paresthesias, incoordination, ataxia, involuntary movements  Endo - no polyuria, polydipsia, nocturia, hot flashes    Visit Vitals  /80   Pulse 67   Temp 97 °F (36.1 °C) (Temporal)   Resp 16   Ht 6' (1.829 m)   Wt 239 lb (108.4 kg)   SpO2 97%   BMI 32.41 kg/m²     A&O x3  Affect is appropriate. Mood stable  No apparent distress  Anicteric, no JVD, adenopathy, questionable goiter noted  No carotid bruits or radiated murmur  Lungs clear to auscultation, no wheezes or rales  Heart showed regular rate and rhythm. No  rubs, gallops.   2/6 jodie lsb without rad  Abdomen soft nontender, no hepatosplenomegaly or masses. Extremities with 1+ bilat edema above ankles.   Pulses 1-2+ symmetrically  Rectal deferred as going for colo early 2022    LABS  From 2/10 showed   gluc 93,   cr 0.80,      alt 33,               chol 188, tg 74, hdl 45, ldl-c 128,             wbc 4.3, hb 14.3, plt 197,            psa 0.70, ua neg  From 10/11 showed gluc 102, cr 0.89, gfr 101, alt 25,           ldl-p 1499,             tsh 5.03, wbc 3.7, hb 13.4, plt 231  From 12/11 showed                     tsh 2.24,          ft4 1.02,    tpo ab+  From 4/12 showed                     tsh 4.18  From 9/12 showed   gluc 106, cr 0.70, gfr>60,  alt 34,           ldl-p 1839, chol 191, tg 76, hdl 42, ldl-c 134  From 1/13 showed   gluc 100,       hba1c 6.0,      chol 174, tg 52, hdl 35, ldl-c 127,                        vit d 22.3  From 4/13 showed         hba1c 5.8,                                    b12 499,  fol 12.5, spep neg, guera neg  From 1/14 showed   gluc 97,   cr 0.90, gfr>60,  alt 26, hba1c 5.8,              chol 190, tg 85, hdl 44, ldl-c 129, tsh 3.72, wbc 4.9, hb 13.4, plt 262, vit d 29.2  From 5/15 showed   gluc 100, cr 0.91, gfr 94,   alt 24,       chol 177, tg 79, hdl 44, ldl-c 117, tsh 4.35, wbc 5.5, hb 13.1, plt 240  From 5/15 showed                     tsh 4.34,                      b12 393,  fol 11.3  From 9/15 showed            chol 183, tg 96, hdl 47, ldl-c 117, tsh 3.00,          vit d 29.0  From 5/16 showed   gluc 99,   cr 0.90, gfr>60, alt 21, hba1c 6.0,               tsh 4.73, wbc 4.4, hb 13.3, plt 222  From 1/17 showed   gluc 104, cr 0.70, gfr>60, alt 25,       chol 157, tg 71, hdl 46, ldl-c 97,   tsh 2.41, wbc 3.6, hb 13.2, plt 240, vit d 24.2, psa 1.24  From 6/17 showed   gluc 104, cr 0.80, gfr>60, alt 20, hba1c 6.0,     chol 105, tg 34, hdl 59, ldl-c 40,          vit d 37.0  From 12/17 showed                             RA neg,   CCP neg, crp 0.1  From 6/18 showed   gluc 92,   cr 0.70, gfr>60, alt 29, hba1c 5.7,      chol 132, tg 78, hdl 46, ldl-c 70,   tsh 1.92, wbc 3.4, hb 14.0, plt 237, ft4 1.10,    psa 1.23, ua neg  From 4/19 showed   gluc 99,   cr 0.80, gfr>60, alt 27,                              lip 29  From 7/19 showed   gluc 107, cr 0.70, gfr>60, alt 22, hba1c 5.4,      chol 110, tg 46, hdl 52, ldl-c 48,   tsh 3.70,                    lip 29  From 7/20 showed   gluc 103, cr 0.70, gfr>60, alt 18, hba1c 5.4,      chol 118, tg 41, hdl 58, ldl-c 52,   tsh 3.06, wbc 3.9, hb 14.5, plt 203, ft4 1.00,    psa 2.12  From 7/21 showed   gluc 110, cr 0.80, gfr>60, alt 20, hba1c 5.9,      chol 130, tg 72, hdl 54, ldl-c 62,   tsh 2.99, wbc 4.2, hb 14.6, plt 171, ft4 1.20,    psa 2.11     Results for orders placed or performed in visit on 07/21/22   LIPID PANEL   Result Value Ref Range    Triglyceride 46 40 - 149 mg/dL    HDL Cholesterol 47 >=40 mg/dL    Cholesterol, total 108 (L) 110 - 200 mg/dL    CHOLESTEROL/HDL 2.3 0.0 - 5.0    Non-HDL Cholesterol 61 <130 mg/dL    LDL, calculated 52 50 - 99 mg/dL    VLDL, calculated 9 8 - 30 mg/dL    LDL/HDL Ratio 1.1    METABOLIC PANEL, COMPREHENSIVE   Result Value Ref Range    Glucose 99 70 - 99 mg/dL    BUN 16 6 - 22 mg/dL    Creatinine 0.7 (L) 0.8 - 1.6 mg/dL    Sodium 143 133 - 145 mmol/L    Potassium 4.5 3.5 - 5.5 mmol/L    Chloride 106 98 - 110 mmol/L    CO2 25 20 - 32 mmol/L    AST (SGOT) 15 10 - 37 U/L    ALT (SGPT) 16 5 - 40 U/L    Alk. phosphatase 76 40 - 125 U/L    Bilirubin, total 0.3 0.2 - 1.2 mg/dL    Calcium 9.2 8.4 - 10.5 mg/dL    Protein, total 6.7 6.2 - 8.1 g/dL    Albumin 4.6 3.5 - 5.0 g/dL    A-G Ratio 2.2 1.1 - 2.6 ratio    Globulin 2.1 2.0 - 4.0 g/dL    GLOMERULAR FILTRATION RATE >60.0 >60.0 mL/min/1.73 sq.m.     Anion gap 12.0 3.0 - 15.0 mmol/L   PSA SCREENING (SCREENING)   Result Value Ref Range    Prostate Specific Ag 1.580 <=4.100 ng/mL   CBC W/O DIFF   Result Value Ref Range    WBC 4.5 4.0 - 11.0 K/uL    RBC 4.44 3.80 - 5.80 M/uL    HGB 13.0 (L) 13.1 - 17.2 g/dL    HCT 44.2 39.3 - 51.6 %     (H) 80 - 95 fL    MCH 29 26 - 34 pg    MCHC 29 (L) 31 - 36 g/dL    RDW 13.3 10.0 - 15.5 %    PLATELET 935 485 - 849 K/uL    MPV 12.2 9.0 - 13.0 fL   HEMOGLOBIN A1C W/O EAG   Result Value Ref Range    Hemoglobin A1c 5.8 (H) 4.8 - 5.6 %    AVG  91 - 123 mg/dL     We reviewed the patient's labs from the last several visits to point out trends in the numbers          Patient Active Problem List   Diagnosis Code    CVA Dr. Lindsay Ngo 2006 I63.9    MARLON (obstructive sleep apnea) G47.33    Anxiety and depression 2011 F41.9    Colon polyps and diverticulosis Dr. Mary Mccord 2008 K63.5    Hypogonadism Dr. David Levels 2011 E29.1    Dyslipidemia E78.5    Hypovitaminosis D E55.9    Neuropathy G62.9    Hypothyroidism due to acquired atrophy of thyroid E03.4    Gastroesophageal reflux disease without esophagitis K21.9    IFG (impaired fasting glucose) R73.01    Primary osteoarthritis involving multiple joints Dr Connie Morrow M15.9    Essential hypertension I10    Obesity (BMI 30.0-34. 9) E66.9    Complex tear of medial meniscus of left knee as current injury S83.232A     Assessment and plan:  1. HLP. Continue current regimen   2. Hypovit d. Continue current regimen. 3. Hypothyroidism. Therapeutic   4. Hypogonadism. F/U Dr. David Levels as needed, he is off replacement. 5. GERD. PPI and avoidance measures  6. IFG. Lifestyle and dietary measures, wt loss would be ideal, a1c next draw  7. Neuropathy. Declined meds   8. Polyps and diverticulosis. Fiber, f/u Dr Mary Mccord 2018  9. Obesity. Lifestyle and dietary measures. Portion control reiterated. 10.  MARLON. F/U Dr Maxi Almeida in August  11. OA knees. For TKR early 2023 by Dr Candelario Neves  12. LUTS. Declined meds  13. [place] to be scheduled  14. Choking spells. Refer for possible EGD  15.  R/O goiter.  US thyroid ordered and quick discussion on thyroid disease in general        RTC 7/23      Above conditions discussed at length and patient vocalized understanding. All questions answered to patient satisfaction          ICD-10-CM ICD-9-CM    1. Encounter for annual physical exam  Z00.00 V70.0 TSH AND FREE T4      VITAMIN D, 25 HYDROXY      2. Hypovitaminosis D  E55.9 268.9 TSH AND FREE T4      VITAMIN D, 25 HYDROXY      VITAMIN D, 25 HYDROXY      3. Hypothyroidism due to acquired atrophy of thyroid  E03.4 244.8 TSH AND FREE T4     246.8 VITAMIN D, 25 HYDROXY      T4, FREE      TSH 3RD GENERATION      4. Goiter  E04.9 240.9 US THYROID/PARATHYROID/SOFT TISS      T4, FREE      TSH 3RD GENERATION      5. Dysphagia, unspecified type  R13.10 787.20 REFERRAL TO GASTROENTEROLOGY      6. Screening for colon cancer  Z12.11 V76.51 REFERRAL TO GASTROENTEROLOGY      7. Physical exam  Z00.00 V70.9 CBC W/O DIFF      METABOLIC PANEL, COMPREHENSIVE      LIPID PANEL      T4, FREE      TSH 3RD GENERATION      PSA SCREENING (SCREENING)      VITAMIN D, 25 HYDROXY      HEMOGLOBIN A1C WITH EAG      8. Dyslipidemia  E78.5 272.4 CBC W/O DIFF      METABOLIC PANEL, COMPREHENSIVE      LIPID PANEL      9. Essential hypertension  I10 401.9       10. C[facility]), [facility]  I63.9 434.91       11. IFG (impaired fasting glucose)  R73.01 790.21 HEMOGLOBIN A1C WITH EAG      12. Obesity (BMI 30.0-34. 9)  E66.9 278.00

## 2022-07-28 DIAGNOSIS — E78.5 DYSLIPIDEMIA: ICD-10-CM

## 2022-07-28 DIAGNOSIS — E03.4 HYPOTHYROIDISM DUE TO ACQUIRED ATROPHY OF THYROID: ICD-10-CM

## 2022-07-28 RX ORDER — LEVOTHYROXINE SODIUM 100 UG/1
TABLET ORAL
Qty: 90 TABLET | Refills: 0 | Status: SHIPPED | OUTPATIENT
Start: 2022-07-28

## 2022-07-29 ENCOUNTER — APPOINTMENT (OUTPATIENT)
Dept: INTERNAL MEDICINE CLINIC | Age: 62
End: 2022-07-29

## 2022-07-29 ENCOUNTER — OFFICE VISIT (OUTPATIENT)
Dept: INTERNAL MEDICINE CLINIC | Age: 62
End: 2022-07-29
Payer: COMMERCIAL

## 2022-07-29 VITALS
WEIGHT: 239 LBS | HEIGHT: 72 IN | OXYGEN SATURATION: 97 % | DIASTOLIC BLOOD PRESSURE: 80 MMHG | SYSTOLIC BLOOD PRESSURE: 138 MMHG | BODY MASS INDEX: 32.37 KG/M2 | RESPIRATION RATE: 16 BRPM | TEMPERATURE: 97 F | HEART RATE: 67 BPM

## 2022-07-29 DIAGNOSIS — E55.9 HYPOVITAMINOSIS D: ICD-10-CM

## 2022-07-29 DIAGNOSIS — R73.01 IFG (IMPAIRED FASTING GLUCOSE): ICD-10-CM

## 2022-07-29 DIAGNOSIS — Z12.11 SCREENING FOR COLON CANCER: ICD-10-CM

## 2022-07-29 DIAGNOSIS — I63.9 CEREBROVASCULAR ACCIDENT (CVA), UNSPECIFIED MECHANISM (HCC): ICD-10-CM

## 2022-07-29 DIAGNOSIS — E04.9 GOITER: ICD-10-CM

## 2022-07-29 DIAGNOSIS — E66.9 OBESITY (BMI 30.0-34.9): ICD-10-CM

## 2022-07-29 DIAGNOSIS — Z00.00 PHYSICAL EXAM: ICD-10-CM

## 2022-07-29 DIAGNOSIS — I10 ESSENTIAL HYPERTENSION: ICD-10-CM

## 2022-07-29 DIAGNOSIS — Z00.00 ENCOUNTER FOR ANNUAL PHYSICAL EXAM: ICD-10-CM

## 2022-07-29 DIAGNOSIS — E78.5 DYSLIPIDEMIA: ICD-10-CM

## 2022-07-29 DIAGNOSIS — E03.4 HYPOTHYROIDISM DUE TO ACQUIRED ATROPHY OF THYROID: ICD-10-CM

## 2022-07-29 DIAGNOSIS — R13.10 DYSPHAGIA, UNSPECIFIED TYPE: ICD-10-CM

## 2022-07-29 DIAGNOSIS — Z00.00 ENCOUNTER FOR ANNUAL PHYSICAL EXAM: Primary | ICD-10-CM

## 2022-07-29 PROBLEM — M25.569 KNEE PAIN: Status: RESOLVED | Noted: 2017-10-18 | Resolved: 2022-07-29

## 2022-07-29 PROBLEM — M17.12 PRIMARY OSTEOARTHRITIS OF LEFT KNEE: Status: RESOLVED | Noted: 2018-01-23 | Resolved: 2022-07-29

## 2022-07-29 PROBLEM — R60.0 LOCALIZED EDEMA: Status: RESOLVED | Noted: 2018-01-12 | Resolved: 2022-07-29

## 2022-07-29 PROBLEM — M17.0 PRIMARY OSTEOARTHRITIS OF BOTH KNEES: Status: RESOLVED | Noted: 2017-10-18 | Resolved: 2022-07-29

## 2022-07-29 PROBLEM — E78.00 PURE HYPERCHOLESTEROLEMIA: Status: RESOLVED | Noted: 2018-01-12 | Resolved: 2022-07-29

## 2022-07-29 PROBLEM — Q21.12 PFO (PATENT FORAMEN OVALE): Status: RESOLVED | Noted: 2018-01-12 | Resolved: 2022-07-29

## 2022-07-29 PROBLEM — J32.9 CHRONIC SINUSITIS: Status: RESOLVED | Noted: 2018-01-12 | Resolved: 2022-07-29

## 2022-07-29 PROBLEM — Z87.74 S/P PERCUTANEOUS PATENT FORAMEN OVALE CLOSURE: Status: RESOLVED | Noted: 2018-03-29 | Resolved: 2022-07-29

## 2022-07-29 PROCEDURE — 99396 PREV VISIT EST AGE 40-64: CPT | Performed by: INTERNAL MEDICINE

## 2022-07-29 NOTE — PROGRESS NOTES
Sharion Cheadle presents with No chief complaint on file. 1. \"Have you been to the ER, urgent care clinic since your last visit? Hospitalized since your last visit? \" No    2. \"Have you seen or consulted any other health care providers outside of the [address] [place] [address] since your last visit? \" No     3. For patients aged 39-70: Has the patient had a colonoscopy / FIT/ Cologuard?  Yes - no Care Gap present      If the patient is female:

## 2022-07-30 LAB
25(OH)D3+25(OH)D2 SERPL-MCNC: 38.2 NG/ML (ref 30–100)
T4 FREE SERPL-MCNC: 1.4 NG/DL (ref 0.82–1.77)
TSH SERPL DL<=0.005 MIU/L-ACNC: 2.02 UIU/ML (ref 0.45–4.5)

## 2022-08-01 ENCOUNTER — TELEPHONE (OUTPATIENT)
Dept: INTERNAL MEDICINE CLINIC | Age: 62
End: 2022-08-01

## 2022-08-01 DIAGNOSIS — F41.9 ANXIETY: ICD-10-CM

## 2022-08-01 RX ORDER — DIAZEPAM 10 MG/1
TABLET ORAL
Qty: 30 TABLET | Refills: 0 | Status: SHIPPED | OUTPATIENT
Start: 2022-08-01 | End: 2022-09-02

## 2022-08-01 RX ORDER — ROSUVASTATIN CALCIUM 20 MG/1
20 TABLET, COATED ORAL
Qty: 90 TABLET | Refills: 0 | Status: SHIPPED | OUTPATIENT
Start: 2022-08-01 | End: 2022-11-02 | Stop reason: SDUPTHER

## 2022-08-01 NOTE — TELEPHONE ENCOUNTER
Med pended for generic as per insurance preference. Please sign if appropriate. Requested Prescriptions     Pending Prescriptions Disp Refills    rosuvastatin (Crestor) 20 mg tablet 90 Tablet 0     Sig: Take 1 Tablet by mouth nightly. Signed Prescriptions Disp Refills    levothyroxine (SYNTHROID) 100 mcg tablet 90 Tablet 0     Sig: TAKE 1 TABLET BY MOUTH ONCE DAILY BEFORE BREAKFAST     Authorizing Provider: Ricky Don, [address] [employer] Only    CPA in place:   Recommendation Provided To:    Intervention Detail: New Rx: 2, reason: Cost/Formulary Change and Patient Preference  Gap Closed?:   Intervention Accepted By:   Time Spent (min): 5

## 2022-08-01 NOTE — TELEPHONE ENCOUNTER
1.Pt called to schedule us of thyroid and they will be on vacation when they want to schedule. Next available date is 8/17 but they said they need doctor approval to put in that date. Notify scheduling that date is ok    2. Vit D level drawn on Friday. It is low can RD order Vit D? [facility]. 3. Pt was supposed to get referral to Endo. Wants to know who so they can call and make appt. 4. Coupon for cholesterol is 312.00 after coupon, they were supposed to call and let Dede Hodge know. Cannabis abuse with cannabis-induced disorder

## 2022-08-02 NOTE — TELEPHONE ENCOUNTER
Patient wants to know if he needs any supplement for his Vit D level, states he used to have to supplements twice weekly, and his level is 38.2. Patient has been scheduled for US of thyroid on 8-17-22, wants to make sure that is an ok time frame? .    Patient advised that its usually a discount card OR insurance not both, so they probably ran the insurance and that was his copay, although he tried to explain to the person at the pharmacy, but they would not listen. Patient mailed another discount card for mail order options.

## 2022-08-08 NOTE — TELEPHONE ENCOUNTER
Patient is aware results and aware there is no rush on Thyroid US. Patient verbalizes understanding.

## 2022-08-09 ENCOUNTER — TELEPHONE (OUTPATIENT)
Dept: INTERNAL MEDICINE CLINIC | Age: 62
End: 2022-08-09

## 2022-08-12 DIAGNOSIS — E04.9 GOITER: ICD-10-CM

## 2022-08-17 ENCOUNTER — HOSPITAL ENCOUNTER (OUTPATIENT)
Dept: ULTRASOUND IMAGING | Age: 62
Discharge: HOME | End: 2022-08-17
Attending: INTERNAL MEDICINE
Payer: COMMERCIAL

## 2022-08-17 PROCEDURE — 76536 US EXAM OF HEAD AND NECK: CPT

## 2022-08-19 ENCOUNTER — TELEPHONE (OUTPATIENT)
Dept: INTERNAL MEDICINE CLINIC | Age: 62
End: 2022-08-19

## 2022-08-19 NOTE — TELEPHONE ENCOUNTER
Patient has seen his result in [place], it was only done two days ago, will forward to MD for review.

## 2022-09-01 DIAGNOSIS — F41.9 ANXIETY: ICD-10-CM

## 2022-09-02 RX ORDER — DIAZEPAM 10 MG/1
TABLET ORAL
Qty: 30 TABLET | Refills: 0 | Status: SHIPPED | OUTPATIENT
Start: 2022-09-02 | End: 2022-10-03

## 2022-10-02 DIAGNOSIS — F41.9 ANXIETY: ICD-10-CM

## 2022-10-03 RX ORDER — DIAZEPAM 10 MG/1
TABLET ORAL
Qty: 30 TABLET | Refills: 0 | Status: SHIPPED | OUTPATIENT
Start: 2022-10-03 | End: 2022-11-01

## 2022-10-24 ENCOUNTER — TELEPHONE (OUTPATIENT)
Dept: INTERNAL MEDICINE CLINIC | Age: 62
End: 2022-10-24

## 2022-10-24 NOTE — TELEPHONE ENCOUNTER
Patient states he cannot use [facility] because he needs to have name brand Crestor. Patient is asking if there is something cheaper he can take?

## 2022-10-24 NOTE — TELEPHONE ENCOUNTER
Dyslipidemia        intol lipitor, prava, zocor; tolerated mevacor       With h/o cva, guidelines state he should be on high intensity statin either crestor or lipitor    If unable to we can try medium intensity statin like mevacor or pitavastatin + zetia (so 2 pills instead of 1)    Why can't he take generic crestor again?     Usually the insurance will not cave on covering name brand statin due to cost to them

## 2022-10-24 NOTE — TELEPHONE ENCOUNTER
Patient is calling in regards to the Crestor. Wanting to know if we can file an appeal because one month supply costs $300.

## 2022-10-26 NOTE — TELEPHONE ENCOUNTER
Patient contacted, patient identified with two identifiers (Name & ). Patient advised of the discounted rate at [facility] utilizing [employer]. Coupon is printed and waiting for him at the . Patient will try and print it at home, if he can't he'll  in office. Patient stated they will stay on Crestor, due to the savings with the coupon.

## 2022-10-27 NOTE — TELEPHONE ENCOUNTER
Patient stating the coupon we gave him is for the generic but he can't take that. Stating he needs a coupon for the brand name.

## 2022-10-28 NOTE — TELEPHONE ENCOUNTER
Patient contacted, patient identified with two identifiers (Name & ). Patient is going to reach out to insurance to resolve the cost of Crestor. He states he has reactions when taking Rosuvastatin. Patient advised he needs to communicate with pharmacy and insurance to resolve price.

## 2022-10-31 DIAGNOSIS — F41.9 ANXIETY: ICD-10-CM

## 2022-11-01 RX ORDER — DIAZEPAM 10 MG/1
TABLET ORAL
Qty: 30 TABLET | Refills: 0 | Status: SHIPPED | OUTPATIENT
Start: 2022-11-01 | End: 2022-11-30

## 2022-11-02 DIAGNOSIS — E78.5 DYSLIPIDEMIA: ICD-10-CM

## 2022-11-02 RX ORDER — ROSUVASTATIN CALCIUM 20 MG/1
20 TABLET, COATED ORAL
Qty: 90 TABLET | Refills: 3 | Status: SHIPPED | OUTPATIENT
Start: 2022-11-02

## 2022-11-02 NOTE — TELEPHONE ENCOUNTER
Last Visit: 7/29/22 with MD Anayeli Selby  Next Appointment: 12/29/22 with MD Anayeli Selby  Previous Refill Encounter(s): 8/1/22 #90    Requested Prescriptions     Pending Prescriptions Disp Refills    rosuvastatin (Crestor) 20 mg tablet 90 Tablet 3     Sig: Take 1 Tablet by mouth nightly. For [address] [facility] in place:   Recommendation Provided To:    Intervention Detail: New Rx: 1, reason: Patient Preference  Gap Closed?:   Intervention Accepted By:   Time Spent (min): 5

## 2022-11-07 DIAGNOSIS — E03.4 HYPOTHYROIDISM DUE TO ACQUIRED ATROPHY OF THYROID: ICD-10-CM

## 2022-11-09 RX ORDER — LEVOTHYROXINE SODIUM 100 UG/1
TABLET ORAL
Qty: 90 TABLET | Refills: 3 | Status: SHIPPED | OUTPATIENT
Start: 2022-11-09

## 2022-11-10 ENCOUNTER — TELEPHONE (OUTPATIENT)
Dept: INTERNAL MEDICINE CLINIC | Age: 62
End: 2022-11-10

## 2022-11-10 NOTE — TELEPHONE ENCOUNTER
Patient called and states that he was taking Crestor but had to switch to the generic due to the cost, but after 6 days of trying the generic he had to stop due to the side effects, states he was having flu like symptoms with being achy all over. He is calling to see what Dr. Priscilla Dinh suggests, states the Rosuvastatin gives him side effects but the Crestor is too expensive, costing $300. Patient can be reached at [phone].   Please advise, thank you

## 2022-11-11 NOTE — TELEPHONE ENCOUNTER
active ingredient of branded crestor is rosuvastatin - same as generic - so shouldn't really be reacting to it    He has tried prava, simva, atorva - all resulted in aches  There are other statins but may have similar reaction    We can try a non statin product - zetia - but it will not get him to target as too weak    Alternatively, we can give him zetia and he takes the rosuvastatin 2x/week and see if that's enough to get him close to target

## 2022-11-14 NOTE — TELEPHONE ENCOUNTER
Patient contacted, patient identified with two identifiers (Name & ). Patient aware of alternatives per Dr. Shayy Pierson and verbalizes understanding. Patient is going to try the rosuvastatin, now that he understands its the same.    If he has any reactions he will call back in a few weeks, and we will attempt the alternative of: zetia and he takes the rosuvastatin 2x/week and see if that's enough to get him close to target

## 2022-11-22 NOTE — TELEPHONE ENCOUNTER
Called Diazepam into [address] called pt to update, no answer, LVM to return call.

## 2022-11-29 DIAGNOSIS — F41.9 ANXIETY: ICD-10-CM

## 2022-11-30 RX ORDER — DIAZEPAM 10 MG/1
TABLET ORAL
Qty: 30 TABLET | Refills: 0 | Status: SHIPPED | OUTPATIENT
Start: 2022-11-30

## 2022-12-19 ENCOUNTER — HOSPITAL ENCOUNTER (OUTPATIENT)
Dept: LAB | Age: 62
Discharge: HOME | End: 2022-12-19

## 2022-12-19 ENCOUNTER — HOSPITAL ENCOUNTER (OUTPATIENT)
Dept: LAB | Age: 62
Discharge: HOME | End: 2022-12-19
Payer: COMMERCIAL

## 2022-12-19 ENCOUNTER — HOSPITAL ENCOUNTER (OUTPATIENT)
Dept: GENERAL RADIOLOGY | Age: 62
Discharge: HOME | End: 2022-12-19
Payer: COMMERCIAL

## 2022-12-19 DIAGNOSIS — M25.561 PAIN IN BOTH KNEES, UNSPECIFIED CHRONICITY: ICD-10-CM

## 2022-12-19 DIAGNOSIS — M25.562 PAIN IN BOTH KNEES, UNSPECIFIED CHRONICITY: ICD-10-CM

## 2022-12-19 DIAGNOSIS — M17.0 OSTEOARTHRITIS OF BOTH KNEES, UNSPECIFIED OSTEOARTHRITIS TYPE: ICD-10-CM

## 2022-12-19 LAB — XX-LABCORP SPECIMEN COL,LCBCF: NORMAL

## 2022-12-19 PROCEDURE — 71046 X-RAY EXAM CHEST 2 VIEWS: CPT

## 2022-12-19 PROCEDURE — 73564 X-RAY EXAM KNEE 4 OR MORE: CPT

## 2022-12-19 PROCEDURE — 99001 SPECIMEN HANDLING PT-LAB: CPT

## 2022-12-19 PROCEDURE — 93005 ELECTROCARDIOGRAM TRACING: CPT

## 2022-12-20 LAB
ATRIAL RATE: 64 BPM
CALCULATED P AXIS, ECG09: 50 DEGREES
CALCULATED R AXIS, ECG10: 54 DEGREES
CALCULATED T AXIS, ECG11: 70 DEGREES
CREATININE, EXTERNAL: 0.91
DIAGNOSIS, 93000: NORMAL
P-R INTERVAL, ECG05: 174 MS
Q-T INTERVAL, ECG07: 460 MS
QRS DURATION, ECG06: 106 MS
QTC CALCULATION (BEZET), ECG08: 474 MS
VENTRICULAR RATE, ECG03: 64 BPM

## 2022-12-21 NOTE — PROGRESS NOTES
Nataliia Dotson presents today for   Chief Complaint   Patient presents with    Pre-op Exam     01-, Left TKA, Dr. Ivett Fabian  03-, Right TKA, Dr. Ivett Fabian     EKG done 12-  1. \"Have you been to the ER, urgent care clinic since your last visit? Hospitalized since your last visit? \" no    2. \"Have you seen or consulted any other health care providers outside of the [address] [place] [address] since your last visit? \" no     3. For patients aged 39-70: Has the patient had a colonoscopy / FIT/ Cologuard? No      If the patient is female:    4. For patients aged 41-77: Has the patient had a mammogram within the past 2 years? NA - based on age or sex  See top three    5. For patients aged 21-65: Has the patient had a pap smear? NA - based on age or sex    Nataliia Dotson 1960 male who presents for Pre-operation screenings, is due for UDS and Controlled substance medication agreement signing. Order placed for Urine Drug Screen (UDS),  per Verbal Order from Dr. Brandon Rodriguez with read back.     Mimi , BIJU

## 2022-12-23 NOTE — PROGRESS NOTES
57 y.o. WHITE/NON- male who presents for med clearance    He will be undergoing LEFT TKR by Dr Kelsie Charles at [facility] 1/26/23. Possibly get the other knee done in March if all goes well    No cardiovascular complaints.   Exercise is limited by the knees although he tries to be active    The sleep apnea is controlled on cpap     He had egd and colo as below by Dr Crow Jack, no sx since then    He has occasional LUTS sx mostly nocturia and dec strength of stream.  Not interested in meds     Past Medical History:   Diagnosis Date    Allergic rhinitis, seasonal     Anxiety     LOPEZ-7 was 11/21 from 7/11    Colon polyps 2008    Dr. Ciaran Martinez    CVA (cerebral vascular accident) [facility]) 2006    Dr. Genevieve Sylvester s/p PFO closure, carotids negative    Degenerative arthritis of cervical spine 2013    on mri    Depression     PHQ-9 was 7/27 from (7/11); 10/27 from (5/15); lexapro intol; effexor briefly 2020    Diverticulosis     Dyslipidemia     intol lipitor, prava, zocor; tolerated mevacor    FHx: heart disease     GERD (gastroesophageal reflux disease)     egd 10/22 Dr Crow Jack mild reflux changes, neg dysplasia or eoe    H/O cardiovascular stress test     ETT neg (5/16)    H/O echocardiogram     (1/18) nl lv, ef 60%, mild dd, mild/mod LVH, tr AI, aortic root 4cm, well seated pfo closure device Dr Chalmer Gilford    Hearing loss 2016    right sided; s/p myringotomy Dr Magalys Medrano; now seeing diff ENT    Hemorrhoid     Hypertension 03/22/2018    component of white coat    Hypogonadism male 2011    Dr Shahzad Ching; dc'ed replacement tx 2015    Hypothyroidism     IFG (impaired fasting glucose) 2011    Nephrolithiasis     Obesity (BMI 30-39.9)     peak weight 245 lbs, bmi 34.7 from 5/15; dec julia supp, med sup wt loss, bariatrics; IF 6/18 start weight 251 but did not do; did keto/low carb 6/18    MARLON (obstructive sleep apnea) 2006    Dr. Nicholas Calvillo    Osteoarthritis of both knees 2017    Peripheral neuropathy 02/2013    on EMG negative serologies    PFO (patent foramen ovale) s/p closure Dr. Leanne Harden 2006      Past Surgical History:   Procedure Laterality Date    HX COLONOSCOPY      Dr. Jeefrson Russell 2008 polyp; Dr Jordan Kiran 2/3/12 neg; Dr Tom Salazar (11/22) neg    HX HEENT  08/2022     thyroid neg    HX HERNIA REPAIR      HX KNEE ARTHROSCOPY Left 3-4 yrs ago    [address]    HX TONSILLECTOMY       Social History     Socioeconomic History    Marital status:      Spouse name: Not on file    Number of children: 2    Years of education: Not on file    Highest education level: Not on file   Occupational History    Occupation: auto parts worker   Tobacco Use    Smoking status: Never    Smokeless tobacco: Never   Substance and Sexual Activity    Alcohol use: No    Drug use: No    Sexual activity: Yes     Partners: Female   Other Topics Concern    Not on file   Social History Narrative    Not on file     Social Determinants of Health     Financial Resource Strain: Not on file   Food Insecurity: Not on file   Transportation Needs: Not on file   Physical Activity: Not on file   Stress: Not on file   Social Connections: Not on file   Intimate Partner Violence: Not on file   Housing Stability: Not on file     Allergies   Allergen Reactions    Amoxicillin (Bulk) Unknown (comments)     Rash around collar area    Codeine Other (comments)     Other reaction(s): other/intolerance    Cymbalta [Duloxetine] Other (comments)     Gi upset    Erythromycin Diarrhea    Lexapro [Escitalopram Oxalate] Other (comments)     Gi upset      Lipitor [Atorvastatin] Myalgia    Penicillins Rash and Itching    Pravastatin Myalgia    Septra [Sulfamethoprim Ds] Unknown (comments)    Simvastatin Myalgia     Current Outpatient Medications   Medication Sig    diazePAM (VALIUM) 10 mg tablet TAKE 1 TABLET BY MOUTH EVERY 12 HOURS AS NEEDED FOR ANXIETY .  DO NOT EXCEED 2 PER 24 HOURS  Indications: anxious    levothyroxine (SYNTHROID) 100 mcg tablet TAKE 1 TABLET BY MOUTH ONCE DAILY BEFORE BREAKFAST rosuvastatin (Crestor) 20 mg tablet Take 1 Tablet by mouth nightly. acetaminophen (TYLENOL) 500 mg tablet Take 500 mg by mouth every four (4) hours as needed. fluticasone propionate (FLONASE) 50 mcg/actuation nasal spray Use 2 spray(s) in each nostril once daily    lisinopriL (PRINIVIL, ZESTRIL) 2.5 mg tablet TAKE 1 TABLET BY MOUTH ONCE DAILY FOR HIGH BLOOD PRESSURE    ciprofloxacin 0.3% -fluocinolone 0.025% (OTOVEL) 0.3-0.025 % (0.25 mL) otic solution Administer 0.25 mL into each ear two (2) times a day. aspirin delayed-release 81 mg tablet Take 81 mg by mouth daily. clindamycin (CLEOCIN) 150 mg capsule TAKE 4 CAPSULES BY MOUTH AS DIRECTED ONE HOUR BEFORE DENTAL APPOINTMENT (Patient not taking: Reported on 12/29/2022)    venlafaxine-SR (EFFEXOR-XR) 150 mg capsule Take 1 Cap by mouth daily. (Patient not taking: Reported on 12/29/2022)     No current facility-administered medications for this visit. REVIEW OF SYSTEMS:   Ophtho - no vision change or eye pain  Oral - no mouth pain, tongue or tooth problems  Ears - no hearing loss, ear pain, fullness, no swallowing problems  Cardiac - no CP, PND, orthopnea, edema, palpitations or syncope  Chest - no breast masses  Resp - no wheezing, chronic coughing, dyspnea  GI - no heartburn, nausea, vomiting, change in bowel habits, bleeding, hemorrhoids  Urinary - no dysuria, hematuria, flank pain, urgency, frequency  Neuro - no focal weakness, numbness, paresthesias, incoordination, ataxia, involuntary movements  Endo - no polyuria, polydipsia, nocturia, hot flashes    Visit Vitals  /78   Pulse 73   Temp 98.7 °F (37.1 °C) (Temporal)   Resp 20   Ht 6' (1.829 m)   Wt 241 lb (109.3 kg)   SpO2 98%   BMI 32.69 kg/m²   A&O x3  Affect is appropriate. Mood stable  No apparent distress  Anicteric, no JVD, adenopathy, questionable goiter noted  No carotid bruits or radiated murmur  Lungs clear to auscultation, no wheezes or rales  Heart showed regular rate and rhythm. No  rubs, gallops. 2/6 jodie lsb without rad  Abdomen soft nontender, no hepatosplenomegaly or masses. Extremities with 0-1+ bilat edema above ankles.   Pulses 1-2+ symmetrically    LABS  From 2/10 showed   gluc 93,   cr 0.80,      alt 33,               chol 188, tg 74, hdl 45, ldl-c 128,              wbc 4.3, hb 14.3, plt 197,             psa 0.70, ua neg  From 10/11 showed gluc 102, cr 0.89, gfr 101, alt 25,           ldl-p 1499,              tsh 5.03, wbc 3.7, hb 13.4, plt 231  From 12/11 showed                      tsh 2.24,           ft4 1.02,    tpo ab+  From 4/12 showed                      tsh 4.18  From 9/12 showed   gluc 106, cr 0.70, gfr>60,  alt 34,           ldl-p 1839, chol 191, tg 76, hdl 42, ldl-c 134  From 1/13 showed   gluc 100,       hba1c 6.0,      chol 174, tg 52, hdl 35, ldl-c 127,                         vit d 22.3  From 4/13 showed         hba1c 5.8,                                    b12 499,  fol 12.5, spep neg, guera neg  From 1/14 showed   gluc 97,   cr 0.90, gfr>60,  alt 26, hba1c 5.8,              chol 190, tg 85, hdl 44, ldl-c 129, tsh 3.72, wbc 4.9, hb 13.4, plt 262, vit d 29.2  From 5/15 showed   gluc 100, cr 0.91, gfr 94,   alt 24,       chol 177, tg 79, hdl 44, ldl-c 117, tsh 4.35, wbc 5.5, hb 13.1, plt 240  From 5/15 showed                      tsh 4.34,                       b12 393,  fol 11.3  From 9/15 showed            chol 183, tg 96, hdl 47, ldl-c 117, tsh 3.00,           vit d 29.0  From 5/16 showed   gluc 99,   cr 0.90, gfr>60, alt 21, hba1c 6.0,                tsh 4.73, wbc 4.4, hb 13.3, plt 222  From 1/17 showed   gluc 104, cr 0.70, gfr>60, alt 25,       chol 157, tg 71, hdl 46, ldl-c 97,   tsh 2.41, wbc 3.6, hb 13.2, plt 240, vit d 24.2, psa 1.24  From 6/17 showed   gluc 104, cr 0.80, gfr>60, alt 20, hba1c 6.0,     chol 105, tg 34, hdl 59, ldl-c 40,           vit d 37.0  From 12/17 showed                             RA neg,   CCP neg, crp 0.1  From 6/18 showed   gluc 92, cr 0.70, gfr>60, alt 29, hba1c 5.7,      chol 132, tg 78, hdl 46, ldl-c 70,   tsh 1.92, wbc 3.4, hb 14.0, plt 237, ft4 1.10,   psa 1.23, ua neg  From 4/19 showed   gluc 99,   cr 0.80, gfr>60, alt 27,                              lip 29  From 7/19 showed   gluc 107, cr 0.70, gfr>60, alt 22, hba1c 5.4,      chol 110, tg 46, hdl 52, ldl-c 48,   tsh 3.70,                    lip 29  From 7/20 showed   gluc 103, cr 0.70, gfr>60, alt 18, hba1c 5.4,      chol 118, tg 41, hdl 58, ldl-c 52,   tsh 3.06, wbc 3.9, hb 14.5, plt 203, ft4 1.00,   psa 2.12  From 7/21 showed   gluc 110, cr 0.80, gfr>60, alt 20, hba1c 5.9,      chol 130, tg 72, hdl 54, ldl-c 62,   tsh 2.99, wbc 4.2, hb 14.6, plt 171, ft4 1.20,   psa 2.11   From 7/22 showed   gluc 99,   cr 0.70, gfr>60, alt 16, hba1c 5.8,      chol 108, tg 46, hdl 47, ldl-c 52,   tsh 2.02, wbc 4.5, hb 13.0, plt 153, ft4 1.40,   vit d 38.2    PREOP  From 12/22 showed gluc 92, cr 0.9,1 gfr>60, wbc 5.7, hb 12.9, plt 163, k 4.4  Ekg as read by me showed NSR, nl int, no acute changes  CXR with MARIAH    We reviewed the patient's labs from the last several visits to point out trends in the numbers          Patient Active Problem List   Diagnosis Code    CVA Dr. Genevieve Sylvester 2006 I63.9    MARLON (obstructive sleep apnea) G47.33    Anxiety and depression 2011 F41.9    Colon polyps and diverticulosis Dr. Ciaran Martinez 2008 K63.5    Hypogonadism Dr. Shahzad Ching 2011 E29.1    Dyslipidemia E78.5    Hypovitaminosis D E55.9    Neuropathy G62.9    Hypothyroidism due to acquired atrophy of thyroid E03.4    Gastroesophageal reflux disease without esophagitis K21.9    IFG (impaired fasting glucose) R73.01    Primary osteoarthritis involving multiple joints Dr Elsie Arita M15.9    Essential hypertension I10    Obesity (BMI 30.0-34. 9) E66.9    Complex tear of medial meniscus of left knee as current injury S83.232A     Assessment and plan:  1. HLP. Continue current regimen   2.  Htn. Controlled on current  3. Hypothyroidism. Therapeutic dosing  4. Hypogonadism. F/U Dr. Tobi Bautista as needed, he is off replacement. 5.  GERD. PPI and avoidance measures  6. IFG. Lifestyle and dietary measures, wt loss would be ideal, a1c next draw  7. Neuropathy. Declined meds   8. Polyps and diverticulosis. Fiber  9. Obesity. Lifestyle and dietary measures. Portion control reiterated. 10.  MARLON. F/U Dr Zari Miranda as directed     NEW ISSUES  11.  OA. He is felt to be average risk for the planned procedure, no contraindications noted. Routine postop prophylaxis per protocol. If needed, cover with correction insulin per hospital protocol      RTC 7/23      Above conditions discussed at length and patient vocalized understanding. All questions answered to patient satisfaction          ICD-10-CM ICD-9-CM    1. Preop exam for internal medicine  Z01.818 V72.83       2. Primary osteoarthritis of both knees  M17.0 715.16       3. Anxiety  F41.9 300.00 diazePAM (VALIUM) 10 mg tablet      4. Encounter for therapeutic drug monitoring  Z51.81 V58.83 DRUG SCREEN UR - W/ CONFIRM      5. Essential hypertension  I10 401.9       6. Hypothyroidism due to acquired atrophy of thyroid  E03.4 244.8      246.8       7.  IFG (impaired fasting glucose)  R73.01 790.21

## 2022-12-27 DIAGNOSIS — Z96.652 STATUS POST TOTAL LEFT KNEE REPLACEMENT: Primary | ICD-10-CM

## 2022-12-29 ENCOUNTER — OFFICE VISIT (OUTPATIENT)
Dept: INTERNAL MEDICINE CLINIC | Age: 62
End: 2022-12-29
Payer: COMMERCIAL

## 2022-12-29 VITALS
HEART RATE: 73 BPM | TEMPERATURE: 98.7 F | WEIGHT: 241 LBS | SYSTOLIC BLOOD PRESSURE: 138 MMHG | DIASTOLIC BLOOD PRESSURE: 78 MMHG | BODY MASS INDEX: 32.64 KG/M2 | HEIGHT: 72 IN | OXYGEN SATURATION: 98 % | RESPIRATION RATE: 20 BRPM

## 2022-12-29 DIAGNOSIS — M17.0 PRIMARY OSTEOARTHRITIS OF BOTH KNEES: ICD-10-CM

## 2022-12-29 DIAGNOSIS — R73.01 IFG (IMPAIRED FASTING GLUCOSE): ICD-10-CM

## 2022-12-29 DIAGNOSIS — E03.4 HYPOTHYROIDISM DUE TO ACQUIRED ATROPHY OF THYROID: ICD-10-CM

## 2022-12-29 DIAGNOSIS — F41.9 ANXIETY: ICD-10-CM

## 2022-12-29 DIAGNOSIS — Z51.81 ENCOUNTER FOR THERAPEUTIC DRUG MONITORING: ICD-10-CM

## 2022-12-29 DIAGNOSIS — Z01.818 PREOP EXAM FOR INTERNAL MEDICINE: Primary | ICD-10-CM

## 2022-12-29 DIAGNOSIS — I10 ESSENTIAL HYPERTENSION: ICD-10-CM

## 2022-12-29 PROCEDURE — 3074F SYST BP LT 130 MM HG: CPT | Performed by: INTERNAL MEDICINE

## 2022-12-29 PROCEDURE — 3078F DIAST BP <80 MM HG: CPT | Performed by: INTERNAL MEDICINE

## 2022-12-29 PROCEDURE — 99214 OFFICE O/P EST MOD 30 MIN: CPT | Performed by: INTERNAL MEDICINE

## 2022-12-29 RX ORDER — CLINDAMYCIN HYDROCHLORIDE 150 MG/1
CAPSULE ORAL
COMMUNITY
Start: 2022-12-14

## 2022-12-29 RX ORDER — DIAZEPAM 10 MG/1
TABLET ORAL
Qty: 30 TABLET | Refills: 0 | Status: SHIPPED | OUTPATIENT
Start: 2022-12-29

## 2022-12-29 NOTE — LETTER
CONTROLLED SUBSTANCE MEDICATION AGREEMENT  Patient Name: Alfonso Cummings  Patient YOB: 1960     I understand, that controlled substance medications may be used to help better manage my symptoms and to improve my ability to function at home, work and in social settings. However, I also understand that these medications do have risks, which have been discussed with me, including possible development of physical or psychological dependence. I understand that successful treatment requires mutual trust and honesty between me and my provider. I understand and agree that following this Medication Agreement is necessary in continuing my provider-patient relationship and the success of my treatment plan. Explanation from my Provider: Benefits and Goals of Controlled Substance Medications: There are two potential goals for your treatment: (1) decreased pain and suffering (2) improved daily life functions. There are many possible treatments for your chronic condition(s). Alternatives such as physical therapy, yoga, massage, home daily exercise, meditation, relaxation techniques, injections, chiropractic manipulations, surgery, cognitive therapy, hypnosis and many medications that are not habit-forming may be used. Use of controlled substance medications may be helpful, but they are unlikely to resolve all symptoms or restore all function. Explanation from my Provider: Risks of Controlled Substance Medications:   Opioid pain medications: These medications can lead to problems such as addiction/dependence, sedation, lightheadedness/dizziness, memory issues, falls, constipation, nausea, or vomiting. They may also impair the ability to drive or operate machinery. Additionally, these medications may lower testosterone levels, leading to loss of bone strength, stamina and sex drive.   They may cause problems with breathing, sleep apnea and reduced coughing, which is especially dangerous for patients with lung disease. Overdose or dangerous interactions with alcohol and other medications may occur, leading to death. Hyperalgesia may develop, which means patients receiving opioids for the treatment of pain may become more sensitive to certain painful stimuli, and in some cases, experience pain from ordinarily non-painful stimuli. Women between the ages of 14-53 who could become pregnant should carefully weigh the risks and benefits of opioids with their physicians, as these medications increase the risk of pregnancy complications, including miscarriage,  delivery and stillbirth. It is also possible for babies to be born addicted to opioids. Opioid dependence withdrawal symptoms may include; feelings of uneasiness, increased pain, irritability, belly pain, diarrhea, sweats and goose-flesh. Testosterone replacement therapy:  Potential side effects include increased risk of stroke and heart attack, blood clots, increased blood pressure, increased cholesterol, enlarged prostate, sleep apnea, irritability/aggression and other mood disorders, and decreased fertility. Terry Perez (1960)             Page 1 of 4    Initials:_______    Benzodiazepines and non-benzodiazepine sleep medications: These medications can lead to problems such as addiction/dependence, sedation, fatigue, lightheadedness, dizziness, incoordination, falls, depression, hallucinations, and impaired judgment, memory and concentration. The ability to drive and operate machinery may also be affected. Abnormal sleep-related behaviors have been reported, including sleepwalking, driving, making telephone calls, eating, or having sex while not fully awake. These medications can suppress breathing and worsen sleep apnea, particularly when combined with alcohol or other sedating medications, potentially leading to death. Dependence withdrawal symptoms may include tremors, anxiety, hallucinations and seizures.    Stimulants:  Common adverse effects include addiction/dependence, increased blood pressure and heart rate, decreased appetite, nausea, involuntary weight loss, insomnia,  irritability, and headaches. These risks may increase when these medications are combined with other stimulants, such as caffeine pills or energy drinks, certain weight loss supplements and oral decongestants. Dependence withdrawal symptoms may include depressed mood, loss of interest, suicidal thoughts, anxiety, fatigue, appetite changes and agitation. I agree and understand that I and my prescriber have the following rights and responsibilities regarding my treatment plan:   1. MY RIGHTS:  To be informed of my treatment and medication plan. To be an active participant in my health and wellbeing. 2. MY RESPONSIBILITY AND UNDERSTANDING FOR USE OF MEDICATIONS   I will take medications at the dose and frequency as directed. For my safety, I will not increase or change how I take my medications without the recommendation of my healthcare provider.  I will actively participate in any program recommended by my provider which may improve function, including social, physical, psychological programs.  I will not take my medications with alcohol or other drugs not prescribed to me. I understand that drinking alcohol with my medications increases the chances of side effects, including reduced breathing rate and could lead to personal injury when operating machinery.  I understand that if I have a history of substance use disorders, including alcohol or other illicit drugs, that I may be at increased risk of addiction to my medications.  I agree to notify my provider immediately if I should become pregnant so that my treatment plan can be adjusted.    I agree and understand that I shall only receive controlled substance medications from the prescriber that signed this agreement unless there is written agreement among other prescribers of controlled substances outlining the responsibility of the medications being prescribed.  I understand that the if the controlled medication is not helping to achieve goals, the dosage may be tapered and no longer prescribed. 3. MY RESPONSIBILITY FOR COMMUNICATION / PRESCRIPTION RENEWALS   I agree that all controlled substance medications that I take will be prescribed only by my provider. If another healthcare provider prescribes me medication in an emergency, I will notify my provider within seventy-two (72) hours. Terry Perez (1960)             Page 2 of 4    Initials:_______   I will arrange for refills at the prescribed interval ONLY during regular office hours. I will not ask for refills earlier than agreed, after-hours, on holidays or weekends. Refills may take up to 72 hours for processing and prescriptions to reach the pharmacy.  I will inform my other health care providers that I am taking these medications and of the existence of this [id number]. In the event of an emergency, I will provide the same information to the emergency department prescribers.  I will keep my provider updated on the pharmacy I am using for controlled medication prescription filling. 4. MY RESPONSIBILITY FOR PROTECTING MEDICATIONS   I will protect my prescriptions and medications. I understand that lost or misplaced prescriptions will not be replaced.  I will keep medications only for my own use and will not share them with others. I will keep all medications away from children.  I agree that if my medications are adjusted or discontinued, I will properly dispose of any remaining medications. I understand that I will be required to dispose of any remaining controlled medications as, directed by my prescriber, prior to being provided with any prescriptions for other controlled medications.   Medication drop box locations can be found at: [employer]  5. MY RESPONSIBILITY WITH ILLEGAL DRUGS    I will not use illegal or street drugs or another person's prescription medications not prescribed to me.  If there are identified addiction type symptoms, then referral to a program may be provided by my provider and I agree to follow through with this recommendation. 6. MY RESPONSIBILITY FOR COOPERATION WITH INVESTIGATIONS   I understand that my provider will comply with any applicable law and may discuss my use and/or possible misuse/abuse of controlled substances and alcohol, as appropriate, with any health care provider involved in my care, pharmacist, or legal authority.  I authorize my provider and pharmacy to cooperate fully with law enforcement agencies (as permitted by law) in the investigation of any possible misuse, sale, or other diversion of my controlled substances.  I agree to waive any applicable privilege or right of privacy or confidentiality with respect to these authorizations. 7. PROVIDERS RIGHT TO MONITOR FOR SAFETY: PRESCRIPTION MONITORING / DRUG TESTING   I consent to drug/toxicology screening and will submit to a drug screen upon my providers request to assure I am only taking the prescribed drugs for my safety monitoring. I understand that a drug screen is a laboratory test in which a sample of my urine, blood or saliva is checked to see what drugs I have been taking. This may entail an observed urine specimen, which means that a nurse or other health care provider may watch me provide urine, and I will cooperate if I am asked to provide an observed specimen. Armin Laguerre (1960)             Page 3 of 4    Initials:_______  Alyssa Safer I understand that my provider will check a copy of my State Prescription Monitoring Program () Report in order to safely prescribe medications.      Pill Counts: I consent to pill counts when requested. I may be asked to bring all my prescribed controlled substance medications, in their original bottles, to all of my scheduled appointments. In addition, my provider may ask me to come to the practice at any time for a random pill count. 8. TERMINATION OF THIS AGREEMENT   For my safety, my prescriber has the right to stop prescribing controlled substance medications and may end this agreement.  Conditions that may result in termination of this agreement:  a. I do not show any improvement in pain, or my activity has not improved. b. I develop rapid tolerance or loss of improvement, as described in my treatment plan.  c. I develop significant side effects from the medication. d. My behavior is not consistent with the responsibilities outlined above, thereby causing safety concerns to continue prescribing controlled substance medications. e. I fail to follow the terms of this agreement. f. Other:____________________________     UNDERSTANDING THIS MEDICATION AGREEMENT:    I have read the above and have had all my questions answered. For chronic disease management, I know that my symptoms can be managed with many types of treatments. A chronic medication trial may be part of my treatment, but I must be an active participant in my care. Medication therapy is only one part of my symptom management plan. In some cases, there may be limited scientific evidence to support the chronic use of certain medications to improve symptoms and daily function. Furthermore, in certain circumstances, there may be scientific information that suggests that the use of chronic controlled substances may worsen my symptoms and increase my risk of unintentional death directly related to this medication therapy. I know that if my provider feels my risk from controlled medications is greater than my benefit, I will have my controlled substance medication(s) compassionately lowered or removed altogether. I further agree to allow this office to contact my HIPAA contact if there are concerns about my safety and use of the controlled medications. I have agreed to use the prescribed controlled substance medications to me as instructed by my provider and as stated in this Medication Agreement. My initial on each page and my signature below shows that I have read each page and I have had the opportunity to ask questions with answers provided by my provider.       Patient Name (Printed): _____________________________________    Patient Signature:  ______________________   Date: _____________      Prescriber Name (Printed): ___________________________________    Prescriber Signature: _____________________  Date: _____________     Eugenia Live (1960)             Page 4 of 4

## 2023-01-03 DIAGNOSIS — Z96.652 STATUS POST TOTAL LEFT KNEE REPLACEMENT: Primary | ICD-10-CM

## 2023-01-19 ENCOUNTER — ANESTHESIA EVENT (OUTPATIENT)
Dept: SURGERY | Age: 63
End: 2023-01-19
Payer: COMMERCIAL

## 2023-01-19 RX ORDER — IBUPROFEN 200 MG
4 TABLET ORAL AS NEEDED
Status: CANCELLED | OUTPATIENT
Start: 2023-01-19

## 2023-01-19 RX ORDER — DEXTROSE 50 % IN WATER (D50W) INTRAVENOUS SYRINGE
25-50 AS NEEDED
Status: CANCELLED | OUTPATIENT
Start: 2023-01-19

## 2023-01-23 ENCOUNTER — HOSPITAL ENCOUNTER (OUTPATIENT)
Dept: PREADMISSION TESTING | Age: 63
Discharge: HOME | End: 2023-01-23

## 2023-01-23 ENCOUNTER — OFFICE VISIT (OUTPATIENT)
Dept: ORTHOPEDIC SURGERY | Age: 63
End: 2023-01-23
Payer: COMMERCIAL

## 2023-01-23 DIAGNOSIS — M25.562 CHRONIC PAIN OF LEFT KNEE: Primary | ICD-10-CM

## 2023-01-23 DIAGNOSIS — M17.12 PRIMARY OSTEOARTHRITIS OF LEFT KNEE: ICD-10-CM

## 2023-01-23 DIAGNOSIS — G89.29 CHRONIC PAIN OF LEFT KNEE: Primary | ICD-10-CM

## 2023-01-23 RX ORDER — CLINDAMYCIN HYDROCHLORIDE 300 MG/1
300 CAPSULE ORAL EVERY 8 HOURS
Qty: 9 CAPSULE | Refills: 0 | Status: SHIPPED | OUTPATIENT
Start: 2023-01-23 | End: 2023-01-26

## 2023-01-23 RX ORDER — HYDROCODONE BITARTRATE AND ACETAMINOPHEN 5; 300 MG/1; MG/1
1 TABLET ORAL
Qty: 30 TABLET | Refills: 0 | Status: SHIPPED | OUTPATIENT
Start: 2023-01-23 | End: 2023-01-28 | Stop reason: ALTCHOICE

## 2023-01-23 RX ORDER — ONDANSETRON 4 MG/1
4 TABLET, ORALLY DISINTEGRATING ORAL
Qty: 20 TABLET | Refills: 0 | Status: SHIPPED | OUTPATIENT
Start: 2023-01-23

## 2023-01-23 NOTE — PROGRESS NOTES
Name: Coleman Talley    : 1960     Service Dept: [address] [place] [address] Sports Medicine    Chief Complaint   Patient presents with    Knee Pain    Pre-op Exam        There were no vitals taken for this visit. Allergies   Allergen Reactions    Amoxicillin (Bulk) Unknown (comments)     Rash around collar area    Codeine Other (comments)     Other reaction(s): other/intolerance    Cymbalta [Duloxetine] Other (comments)     Gi upset    Erythromycin Diarrhea    Lexapro [Escitalopram Oxalate] Other (comments)     Gi upset      Lipitor [Atorvastatin] Myalgia    Penicillins Rash and Itching    Pravastatin Myalgia    Septra [Sulfamethoprim Ds] Unknown (comments)    Simvastatin Myalgia        Current Outpatient Medications   Medication Sig Dispense Refill    diazePAM (VALIUM) 10 mg tablet TAKE 1 TABLET BY MOUTH EVERY 12 HOURS AS NEEDED FOR ANXIETY . DO NOT EXCEED 2 PER 24 HOURS  Indications: anxious 30 Tablet 0    levothyroxine (SYNTHROID) 100 mcg tablet TAKE 1 TABLET BY MOUTH ONCE DAILY BEFORE BREAKFAST 90 Tablet 3    rosuvastatin (Crestor) 20 mg tablet Take 1 Tablet by mouth nightly. 90 Tablet 3    acetaminophen (TYLENOL) 500 mg tablet Take 500 mg by mouth every four (4) hours as needed. fluticasone propionate (FLONASE) 50 mcg/actuation nasal spray Use 2 spray(s) in each nostril once daily 48 g 3    lisinopriL (PRINIVIL, ZESTRIL) 2.5 mg tablet TAKE 1 TABLET BY MOUTH ONCE DAILY FOR HIGH BLOOD PRESSURE 90 Tablet 3    aspirin delayed-release 81 mg tablet Take 81 mg by mouth daily.         Patient Active Problem List   Diagnosis Code    CVA Dr. Gabriel Garcia  I63.9    MARLON (obstructive sleep apnea) G47.33    Anxiety and depression  F41.9    Colon polyps and diverticulosis Dr. Marilu Brunner  K63.5    Hypogonadism Dr. Russel Lorenzo  E29.1    Dyslipidemia E78.5    Hypovitaminosis D E55.9    Neuropathy G62.9    Hypothyroidism due to acquired atrophy of thyroid E03.4    Gastroesophageal reflux disease without esophagitis K21.9    IFG (impaired fasting glucose) R73.01    Primary osteoarthritis involving multiple joints Dr Nestora Sacks M15.9    Essential hypertension I10    Obesity (BMI 30.0-34. 9) E66.9    Complex tear of medial meniscus of left knee as current injury S80.12A      Family History   Problem Relation Age of Onset    Hypertension Mother     Cancer Mother         renal and kisney cancer    Arthritis-rheumatoid Mother     Hypertension Father     Heart Disease Father       Social History     Socioeconomic History    Marital status:     Number of children: 2   Occupational History    Occupation: auto parts worker   Tobacco Use    Smoking status: Never    Smokeless tobacco: Never   Vaping Use    Vaping Use: Never used   Substance and Sexual Activity    Alcohol use: No    Drug use: No    Sexual activity: Yes     Partners: Female      Past Surgical History:   Procedure Laterality Date    HX COLONOSCOPY      Dr. Elroy Huynh 2008 polyp; Dr Brennan Winter 2/3/12 neg; Dr Delmar Shearer (11/22) neg    HX HEENT  08/2022    US thyroid neg    HX HERNIA REPAIR      HX KNEE ARTHROSCOPY Left 3-4 yrs ago    [address]        Past Medical History:   Diagnosis Date    Allergic rhinitis, seasonal     Anxiety     LOPEZ-7 was 11/21 from 7/11    Colon polyps 2008    Dr. Elroy Huynh    CVA (cerebral vascular accident) [facility]) 2006    Dr. Pranav Rae s/p PFO closure, carotids negative    Degenerative arthritis of cervical spine 2013    on mri    Depression     PHQ-9 was 7/27 from (7/11); 10/27 from (5/15); lexapro intol; effexor briefly 2020    Diverticulosis     Dyslipidemia     intol lipitor, prava, zocor; tolerated mevacor    FHx: heart disease     GERD (gastroesophageal reflux disease)     egd 10/22 Dr Delmar Shearer mild reflux changes, neg dysplasia or eoe    H/O cardiovascular stress test     ETT neg (5/16)    H/O echocardiogram     (1/18) nl lv, ef 60%, mild dd, mild/mod LVH, tr AI, aortic root 4cm, well seated pfo closure device Dr Alexander Cost    Hearing loss 2016    right sided; s/p myringotomy Dr Christopher Esquivel; now seeing diff ENT    Hemorrhoid     Hypertension 03/22/2018    component of white coat    Hypogonadism male 2011    Dr Gregoria Iqbal; dc'ed replacement tx 2015    Hypothyroidism     IFG (impaired fasting glucose) 2011    Nephrolithiasis     Obesity (BMI 30-39.9)     peak weight 245 lbs, bmi 34.7 from 5/15; dec julia supp, med sup wt loss, bariatrics; IF 6/18 start weight 251 but did not do; did keto/low carb 6/18    MARLON (obstructive sleep apnea) 2006    Dr. Ray Pain    Osteoarthritis of both knees 2017    Peripheral neuropathy 02/2013    on EMG negative serologies    PFO (patent foramen ovale) s/p closure Dr. Hills Bolus 2006         I have reviewed and agree with [facility] and intake form in chart and the record furthermore I have reviewed prior medical record(s) regarding this patients care during this appointment. Review of Systems:   Patient is a pleasant appearing individual, appropriately dressed, well hydrated, well nourished, who is alert, appropriately oriented for age, and in no acute distress with a normal gait and normal affect who does not appear to be in any significant pain. Physical Exam:  Left Knee -Decrease range of motion with flexion, Knee arc of greater than 50 degrees, Some crepitation, Grossly neurovascularly intact, Good cap refill, No skin lesion, Moderate swelling, some gross instability, Some quadriceps weakness, Kellgren and Romulo at least grade 3    Right Knee - Full Range of Motion, No crepitation, Grossly neurovascularly intact, Good cap refill, No skin lesion, No swelling, No gross instability, No quadriceps weakness     Inpatient status: The patient has admitted to severe pain in the affected knee and due to such pain they are unable to complete activities of daily living at home and/or work on a regular basis where conservative treatments have failed.  After extensive discussion with the patient, they have chosen to receive a total knee replacement with the expectation of inpatient procedure. Their dependent functional status (i.e. lack of capable support and safety at home, pain management, comorbities, or difficulty ambulating with assistive walking devices) would deem them a candidate for an inpatient stay. The patient acknowledges and understand the plan. The risks of surgery were explained to the patient which include but not limited to infection, nerve injury, artery injury, tendon injury, poor result, poor wound healing, unforeseen incidence, bleeding, infection, nerve damage, failure to improve, worsening of symptoms, morbidity, and mortality risks were explained. All questions were answered. Patient was told of no guarantees. Patient accepts all risks and benefits. A consent for surgery will be documented and signed by the patient or a legal guardian. All questions were answered. The procedure was explained in detail. The patient was counseled about the risks of tony Covid-19 during their perioperative period and any recovery window from their procedure. The patient was made aware that tony Covid-19 may worsen their prognosis for recovering from their procedure and lend to a higher morbidity and/or mortality risk. All material risks, benefits, and reasonable alternatives including postponing the procedure were discussed. The patient DOES wish to proceed with their procedure at this time. Encounter Diagnoses     ICD-10-CM ICD-9-CM   1. Chronic pain of left knee  M25.562 719.46    G89.29 338.29   2. Primary osteoarthritis of left knee  M17.12 715.16       HPI:  The patient is here with a chief complaint of bilateral knee pain, scheduled for left total knee replacement on 1/26/2023. Stabbing, burning pain, progressively getting worse. Pain is 4/10. No history of blood clots. He had one in 2005, but that was more for a PDA from his heart.     Assessment/Plan:  Plan at this point would be for left total knee replacement, possible semi-constrained, Vicodin, clindamycin, Zofran and aspirin, and we will go from there. As part of continued conservative pain management options the patient was advised to utilize Tylenol or OTC NSAIDS as long as it is not medically contraindicated. Return to Office: Follow-up and Dispositions    Return for already scheduled for surgery. Scribed by Britton Figueroa LPN as dictated by [facility] [place] ADELINE Maher MD.  Documentation, performed by, True and Accepted Magdaleno Maher MD

## 2023-01-23 NOTE — H&P (VIEW-ONLY)
Name: Trudi Ignacio    : 1960     Service Dept: [address] [place] [address] Sports Medicine    Chief Complaint   Patient presents with    Knee Pain    Pre-op Exam        There were no vitals taken for this visit. Allergies   Allergen Reactions    Amoxicillin (Bulk) Unknown (comments)     Rash around collar area    Codeine Other (comments)     Other reaction(s): other/intolerance    Cymbalta [Duloxetine] Other (comments)     Gi upset    Erythromycin Diarrhea    Lexapro [Escitalopram Oxalate] Other (comments)     Gi upset      Lipitor [Atorvastatin] Myalgia    Penicillins Rash and Itching    Pravastatin Myalgia    Septra [Sulfamethoprim Ds] Unknown (comments)    Simvastatin Myalgia        Current Outpatient Medications   Medication Sig Dispense Refill    diazePAM (VALIUM) 10 mg tablet TAKE 1 TABLET BY MOUTH EVERY 12 HOURS AS NEEDED FOR ANXIETY . DO NOT EXCEED 2 PER 24 HOURS  Indications: anxious 30 Tablet 0    levothyroxine (SYNTHROID) 100 mcg tablet TAKE 1 TABLET BY MOUTH ONCE DAILY BEFORE BREAKFAST 90 Tablet 3    rosuvastatin (Crestor) 20 mg tablet Take 1 Tablet by mouth nightly. 90 Tablet 3    acetaminophen (TYLENOL) 500 mg tablet Take 500 mg by mouth every four (4) hours as needed. fluticasone propionate (FLONASE) 50 mcg/actuation nasal spray Use 2 spray(s) in each nostril once daily 48 g 3    lisinopriL (PRINIVIL, ZESTRIL) 2.5 mg tablet TAKE 1 TABLET BY MOUTH ONCE DAILY FOR HIGH BLOOD PRESSURE 90 Tablet 3    aspirin delayed-release 81 mg tablet Take 81 mg by mouth daily.         Patient Active Problem List   Diagnosis Code    CVA Dr. Kristan Cody  I63.9    MARLON (obstructive sleep apnea) G47.33    Anxiety and depression  F41.9    Colon polyps and diverticulosis Dr. Keila Anne  K63.5    Hypogonadism Dr. Ebenezer Santillan 2011 E29.1    Dyslipidemia E78.5    Hypovitaminosis D E55.9    Neuropathy G62.9    Hypothyroidism due to acquired atrophy of thyroid E03.4    Gastroesophageal reflux disease without esophagitis K21.9    IFG (impaired fasting glucose) R73.01    Primary osteoarthritis involving multiple joints Dr Yanelis Rivas M15.9    Essential hypertension I10    Obesity (BMI 30.0-34. 9) E66.9    Complex tear of medial meniscus of left knee as current injury S80.12A      Family History   Problem Relation Age of Onset    Hypertension Mother     Cancer Mother         renal and kisney cancer    Arthritis-rheumatoid Mother     Hypertension Father     Heart Disease Father       Social History     Socioeconomic History    Marital status:     Number of children: 2   Occupational History    Occupation: auto parts worker   Tobacco Use    Smoking status: Never    Smokeless tobacco: Never   Vaping Use    Vaping Use: Never used   Substance and Sexual Activity    Alcohol use: No    Drug use: No    Sexual activity: Yes     Partners: Female      Past Surgical History:   Procedure Laterality Date    HX COLONOSCOPY      Dr. Bekah Meek 2008 polyp; Dr Tavo Rojas 2/3/12 neg; Dr Leslie Armando (11/22) neg    HX HEENT  08/2022     thyroid neg    HX HERNIA REPAIR      HX KNEE ARTHROSCOPY Left 3-4 yrs ago    [address]        Past Medical History:   Diagnosis Date    Allergic rhinitis, seasonal     Anxiety     LOPEZ-7 was 11/21 from 7/11    Colon polyps 2008    Dr. Bekah Meek    CVA (cerebral vascular accident) [facility]) 2006    Dr. Terence Quinteros s/p PFO closure, carotids negative    Degenerative arthritis of cervical spine 2013    on mri    Depression     PHQ-9 was 7/27 from (7/11); 10/27 from (5/15); lexapro intol; effexor briefly 2020    Diverticulosis     Dyslipidemia     intol lipitor, prava, zocor; tolerated mevacor    FHx: heart disease     GERD (gastroesophageal reflux disease)     egd 10/22 Dr Leslie Armando mild reflux changes, neg dysplasia or eoe    H/O cardiovascular stress test     ETT neg (5/16)    H/O echocardiogram     (1/18) nl lv, ef 60%, mild dd, mild/mod LVH, tr AI, aortic root 4cm, well seated pfo closure device Dr Kuldeep Miller    Hearing loss 2016    right sided; s/p myringotomy Dr Jairo Holman; now seeing diff ENT    Hemorrhoid     Hypertension 03/22/2018    component of white coat    Hypogonadism male 2011    Dr Ting Becerril; dc'ed replacement tx 2015    Hypothyroidism     IFG (impaired fasting glucose) 2011    Nephrolithiasis     Obesity (BMI 30-39.9)     peak weight 245 lbs, bmi 34.7 from 5/15; dec julia supp, med sup wt loss, bariatrics; IF 6/18 start weight 251 but did not do; did keto/low carb 6/18    MARLON (obstructive sleep apnea) 2006    Dr. Katina Fajardo    Osteoarthritis of both knees 2017    Peripheral neuropathy 02/2013    on EMG negative serologies    PFO (patent foramen ovale) s/p closure Dr. Darren Zavala 2006         I have reviewed and agree with [facility] and intake form in chart and the record furthermore I have reviewed prior medical record(s) regarding this patients care during this appointment. Review of Systems:   Patient is a pleasant appearing individual, appropriately dressed, well hydrated, well nourished, who is alert, appropriately oriented for age, and in no acute distress with a normal gait and normal affect who does not appear to be in any significant pain. Physical Exam:  Left Knee -Decrease range of motion with flexion, Knee arc of greater than 50 degrees, Some crepitation, Grossly neurovascularly intact, Good cap refill, No skin lesion, Moderate swelling, some gross instability, Some quadriceps weakness, Kellgren and Romulo at least grade 3    Right Knee - Full Range of Motion, No crepitation, Grossly neurovascularly intact, Good cap refill, No skin lesion, No swelling, No gross instability, No quadriceps weakness     Inpatient status: The patient has admitted to severe pain in the affected knee and due to such pain they are unable to complete activities of daily living at home and/or work on a regular basis where conservative treatments have failed.  After extensive discussion with the patient, they have chosen to receive a total knee replacement with the expectation of inpatient procedure. Their dependent functional status (i.e. lack of capable support and safety at home, pain management, comorbities, or difficulty ambulating with assistive walking devices) would deem them a candidate for an inpatient stay. The patient acknowledges and understand the plan. The risks of surgery were explained to the patient which include but not limited to infection, nerve injury, artery injury, tendon injury, poor result, poor wound healing, unforeseen incidence, bleeding, infection, nerve damage, failure to improve, worsening of symptoms, morbidity, and mortality risks were explained. All questions were answered. Patient was told of no guarantees. Patient accepts all risks and benefits. A consent for surgery will be documented and signed by the patient or a legal guardian. All questions were answered. The procedure was explained in detail. The patient was counseled about the risks of tony Covid-19 during their perioperative period and any recovery window from their procedure. The patient was made aware that tony Covid-19 may worsen their prognosis for recovering from their procedure and lend to a higher morbidity and/or mortality risk. All material risks, benefits, and reasonable alternatives including postponing the procedure were discussed. The patient DOES wish to proceed with their procedure at this time. Encounter Diagnoses     ICD-10-CM ICD-9-CM   1. Chronic pain of left knee  M25.562 719.46    G89.29 338.29   2. Primary osteoarthritis of left knee  M17.12 715.16       HPI:  The patient is here with a chief complaint of bilateral knee pain, scheduled for left total knee replacement on 1/26/2023. Stabbing, burning pain, progressively getting worse. Pain is 4/10. No history of blood clots. He had one in 2005, but that was more for a PDA from his heart.     Assessment/Plan:  Plan at this point would be for left total knee replacement, possible semi-constrained, Vicodin, clindamycin, Zofran and aspirin, and we will go from there. As part of continued conservative pain management options the patient was advised to utilize Tylenol or OTC NSAIDS as long as it is not medically contraindicated. Return to Office: Follow-up and Dispositions    Return for already scheduled for surgery. Scribed by Andressa Weinstein LPN as dictated by [facility] [place] ADELINE Lopez MD.  Documentation, performed by, True and Accepted Magdaleno Lopez MD

## 2023-01-23 NOTE — PATIENT INSTRUCTIONS

## 2023-01-23 NOTE — H&P (VIEW-ONLY)
Name: Marie Mayer    : 1960     Service Dept: [address] [place] [address]    Chief Complaint   Patient presents with    Knee Pain    Pre-op Exam        There were no vitals taken for this visit. Allergies   Allergen Reactions    Amoxicillin (Bulk) Unknown (comments)     Rash around collar area    Codeine Other (comments)     Other reaction(s): other/intolerance    Cymbalta [Duloxetine] Other (comments)     Gi upset    Erythromycin Diarrhea    Lexapro [Escitalopram Oxalate] Other (comments)     Gi upset      Lipitor [Atorvastatin] Myalgia    Penicillins Rash and Itching    Pravastatin Myalgia    Septra [Sulfamethoprim Ds] Unknown (comments)    Simvastatin Myalgia        Current Outpatient Medications   Medication Sig Dispense Refill    diazePAM (VALIUM) 10 mg tablet TAKE 1 TABLET BY MOUTH EVERY 12 HOURS AS NEEDED FOR ANXIETY . DO NOT EXCEED 2 PER 24 HOURS  Indications: anxious 30 Tablet 0    levothyroxine (SYNTHROID) 100 mcg tablet TAKE 1 TABLET BY MOUTH ONCE DAILY BEFORE BREAKFAST 90 Tablet 3    rosuvastatin (Crestor) 20 mg tablet Take 1 Tablet by mouth nightly. 90 Tablet 3    acetaminophen (TYLENOL) 500 mg tablet Take 500 mg by mouth every four (4) hours as needed. fluticasone propionate (FLONASE) 50 mcg/actuation nasal spray Use 2 spray(s) in each nostril once daily 48 g 3    lisinopriL (PRINIVIL, ZESTRIL) 2.5 mg tablet TAKE 1 TABLET BY MOUTH ONCE DAILY FOR HIGH BLOOD PRESSURE 90 Tablet 3    aspirin delayed-release 81 mg tablet Take 81 mg by mouth daily.         Patient Active Problem List   Diagnosis Code    CVA Dr. Ruchi Miller  I63.9    MARLON (obstructive sleep apnea) G47.33    Anxiety and depression  F41.9    Colon polyps and diverticulosis Dr. Liya Burgos  K63.5    Hypogonadism Dr. Angelique Mix  E29.1    Dyslipidemia E78.5    Hypovitaminosis D E55.9    Neuropathy G62.9    Hypothyroidism due to acquired atrophy of thyroid E03.4    Gastroesophageal reflux disease without esophagitis K21.9    IFG (impaired fasting glucose) R73.01    Primary osteoarthritis involving multiple joints Dr Tammie Harrison M15.9    Essential hypertension I10    Obesity (BMI 30.0-34. 9) E66.9    Complex tear of medial meniscus of left knee as current injury S80.12A      Family History   Problem Relation Age of Onset    Hypertension Mother     Cancer Mother         renal and kisney cancer    Arthritis-rheumatoid Mother     Hypertension Father     Heart Disease Father       Social History     Socioeconomic History    Marital status:     Number of children: 2   Occupational History    Occupation: auto parts worker   Tobacco Use    Smoking status: Never    Smokeless tobacco: Never   Vaping Use    Vaping Use: Never used   Substance and Sexual Activity    Alcohol use: No    Drug use: No    Sexual activity: Yes     Partners: Female      Past Surgical History:   Procedure Laterality Date    HX COLONOSCOPY      Dr. Alberto Sanchez 2008 polyp; Dr Rito Juares 2/3/12 neg; Dr Gini Beal (11/22) neg    HX HEENT  08/2022     thyroid neg    HX HERNIA REPAIR      HX KNEE ARTHROSCOPY Left 3-4 yrs ago    [address]        Past Medical History:   Diagnosis Date    Allergic rhinitis, seasonal     Anxiety     LOPEZ-7 was 11/21 from 7/11    Colon polyps 2008    Dr. Alberto Sanchez    CVA (cerebral vascular accident) [facility]) 2006    Dr. Delfina Gowers s/p PFO closure, carotids negative    Degenerative arthritis of cervical spine 2013    on mri    Depression     PHQ-9 was 7/27 from (7/11); 10/27 from (5/15); lexapro intol; effexor briefly 2020    Diverticulosis     Dyslipidemia     intol lipitor, prava, zocor; tolerated mevacor    FHx: heart disease     GERD (gastroesophageal reflux disease)     egd 10/22 Dr Gini Beal mild reflux changes, neg dysplasia or eoe    H/O cardiovascular stress test     ETT neg (5/16)    H/O echocardiogram     (1/18) nl lv, ef 60%, mild dd, mild/mod LVH, tr AI, aortic root 4cm, well seated pfo closure device Dr Maddie Rousseau    Hearing loss 2016    right sided; s/p myringotomy Dr Yoshi Thomas; now seeing diff ENT    Hemorrhoid     Hypertension 03/22/2018    component of white coat    Hypogonadism male 2011    Dr Whit Heredia; dc'ed replacement tx 2015    Hypothyroidism     IFG (impaired fasting glucose) 2011    Nephrolithiasis     Obesity (BMI 30-39.9)     peak weight 245 lbs, bmi 34.7 from 5/15; dec julia supp, med sup wt loss, bariatrics; IF 6/18 start weight 251 but did not do; did keto/low carb 6/18    MARLON (obstructive sleep apnea) 2006    Dr. Chaz Manriquez    Osteoarthritis of both knees 2017    Peripheral neuropathy 02/2013    on EMG negative serologies    PFO (patent foramen ovale) s/p closure Dr. Rob Chairez 2006         I have reviewed and agree with [facility] and intake form in chart and the record furthermore I have reviewed prior medical record(s) regarding this patients care during this appointment. Review of Systems:   Patient is a pleasant appearing individual, appropriately dressed, well hydrated, well nourished, who is alert, appropriately oriented for age, and in no acute distress with a normal gait and normal affect who does not appear to be in any significant pain. Physical Exam:  Left Knee -Decrease range of motion with flexion, Knee arc of greater than 50 degrees, Some crepitation, Grossly neurovascularly intact, Good cap refill, No skin lesion, Moderate swelling, some gross instability, Some quadriceps weakness, Kellgren and Romulo at least grade 3    Right Knee - Full Range of Motion, No crepitation, Grossly neurovascularly intact, Good cap refill, No skin lesion, No swelling, No gross instability, No quadriceps weakness     Inpatient status: The patient has admitted to severe pain in the affected knee and due to such pain they are unable to complete activities of daily living at home and/or work on a regular basis where conservative treatments have failed.  After extensive discussion with the patient, they have chosen to receive a total knee replacement with the expectation of inpatient procedure. Their dependent functional status (i.e. lack of capable support and safety at home, pain management, comorbities, or difficulty ambulating with assistive walking devices) would deem them a candidate for an inpatient stay. The patient acknowledges and understand the plan. The risks of surgery were explained to the patient which include but not limited to infection, nerve injury, artery injury, tendon injury, poor result, poor wound healing, unforeseen incidence, bleeding, infection, nerve damage, failure to improve, worsening of symptoms, morbidity, and mortality risks were explained. All questions were answered. Patient was told of no guarantees. Patient accepts all risks and benefits. A consent for surgery will be documented and signed by the patient or a legal guardian. All questions were answered. The procedure was explained in detail. The patient was counseled about the risks of tony Covid-19 during their perioperative period and any recovery window from their procedure. The patient was made aware that tony Covid-19 may worsen their prognosis for recovering from their procedure and lend to a higher morbidity and/or mortality risk. All material risks, benefits, and reasonable alternatives including postponing the procedure were discussed. The patient DOES wish to proceed with their procedure at this time. Encounter Diagnoses     ICD-10-CM ICD-9-CM   1. Chronic pain of left knee  M25.562 719.46    G89.29 338.29   2. Primary osteoarthritis of left knee  M17.12 715.16       HPI:  The patient is here with a chief complaint of bilateral knee pain, scheduled for left total knee replacement on 1/26/2023. Stabbing, burning pain, progressively getting worse. Pain is 4/10. No history of blood clots. He had one in 2005, but that was more for a PDA from his heart.     Assessment/Plan:  Plan at this point would be for left total knee replacement, possible semi-constrained, Vicodin, clindamycin, Zofran and aspirin, and we will go from there. As part of continued conservative pain management options the patient was advised to utilize Tylenol or OTC NSAIDS as long as it is not medically contraindicated. Return to Office: Follow-up and Dispositions    Return for already scheduled for surgery. Scribed by Monica Monterroso LPN as dictated by [facility]PON[facility] [place] ADELINE Alvarez MD.  Documentation, performed by, True and Accepted Magdaleno Alvarez MD

## 2023-01-24 LAB
BACTERIA SPEC CULT: ABNORMAL
BACTERIA SPEC CULT: ABNORMAL
SPECIAL REQUESTS,SREQ: ABNORMAL

## 2023-01-26 ENCOUNTER — HOSPITAL ENCOUNTER (OUTPATIENT)
Age: 63
Discharge: HOME | End: 2023-01-26
Attending: ORTHOPAEDIC SURGERY | Admitting: ORTHOPAEDIC SURGERY
Payer: COMMERCIAL

## 2023-01-26 ENCOUNTER — ANESTHESIA (OUTPATIENT)
Dept: SURGERY | Age: 63
End: 2023-01-26
Payer: COMMERCIAL

## 2023-01-26 VITALS
WEIGHT: 241 LBS | HEART RATE: 71 BPM | TEMPERATURE: 97.6 F | BODY MASS INDEX: 32.64 KG/M2 | RESPIRATION RATE: 18 BRPM | HEIGHT: 72 IN | SYSTOLIC BLOOD PRESSURE: 176 MMHG | DIASTOLIC BLOOD PRESSURE: 85 MMHG | OXYGEN SATURATION: 99 %

## 2023-01-26 DIAGNOSIS — E03.4 HYPOTHYROIDISM DUE TO ACQUIRED ATROPHY OF THYROID: ICD-10-CM

## 2023-01-26 DIAGNOSIS — E78.5 DYSLIPIDEMIA: ICD-10-CM

## 2023-01-26 DIAGNOSIS — E04.9 GOITER: ICD-10-CM

## 2023-01-26 DIAGNOSIS — Z00.00 PHYSICAL EXAM: ICD-10-CM

## 2023-01-26 PROBLEM — M17.9 OA (OSTEOARTHRITIS) OF KNEE: Status: ACTIVE | Noted: 2023-01-26

## 2023-01-26 PROCEDURE — 74011250637 HC RX REV CODE- 250/637: Performed by: NURSE ANESTHETIST, CERTIFIED REGISTERED

## 2023-01-26 PROCEDURE — 74011250636 HC RX REV CODE- 250/636: Performed by: NURSE ANESTHETIST, CERTIFIED REGISTERED

## 2023-01-26 RX ORDER — SENNOSIDES 8.6 MG/1
1 TABLET ORAL 2 TIMES DAILY
Status: CANCELLED | OUTPATIENT
Start: 2023-01-26

## 2023-01-26 RX ORDER — ONDANSETRON 2 MG/ML
4 INJECTION INTRAMUSCULAR; INTRAVENOUS
Status: CANCELLED | OUTPATIENT
Start: 2023-01-26

## 2023-01-26 RX ORDER — SODIUM CHLORIDE 0.9 % (FLUSH) 0.9 %
5-40 SYRINGE (ML) INJECTION AS NEEDED
Status: CANCELLED | OUTPATIENT
Start: 2023-01-26

## 2023-01-26 RX ORDER — SODIUM CHLORIDE, SODIUM LACTATE, POTASSIUM CHLORIDE, CALCIUM CHLORIDE 600; 310; 30; 20 MG/100ML; MG/100ML; MG/100ML; MG/100ML
25 INJECTION, SOLUTION INTRAVENOUS CONTINUOUS
Status: DISCONTINUED | OUTPATIENT
Start: 2023-01-26 | End: 2023-01-26 | Stop reason: HOSPADM

## 2023-01-26 RX ORDER — ACETAMINOPHEN 325 MG/1
650 TABLET ORAL
Status: CANCELLED | OUTPATIENT
Start: 2023-01-26

## 2023-01-26 RX ORDER — ACETAMINOPHEN 500 MG
1000 TABLET ORAL ONCE
Status: COMPLETED | OUTPATIENT
Start: 2023-01-26 | End: 2023-01-26

## 2023-01-26 RX ORDER — SODIUM CHLORIDE 0.9 % (FLUSH) 0.9 %
5-40 SYRINGE (ML) INJECTION EVERY 8 HOURS
Status: CANCELLED | OUTPATIENT
Start: 2023-01-26

## 2023-01-26 RX ORDER — SODIUM CHLORIDE 0.9 % (FLUSH) 0.9 %
5-40 SYRINGE (ML) INJECTION AS NEEDED
Status: DISCONTINUED | OUTPATIENT
Start: 2023-01-26 | End: 2023-01-26 | Stop reason: HOSPADM

## 2023-01-26 RX ORDER — DIPHENHYDRAMINE HYDROCHLORIDE 50 MG/ML
12.5 INJECTION, SOLUTION INTRAMUSCULAR; INTRAVENOUS
Status: CANCELLED | OUTPATIENT
Start: 2023-01-26

## 2023-01-26 RX ORDER — FACIAL-BODY WIPES
10 EACH TOPICAL DAILY PRN
Status: CANCELLED | OUTPATIENT
Start: 2023-01-26

## 2023-01-26 RX ORDER — HYDROCODONE BITARTRATE AND ACETAMINOPHEN 5; 325 MG/1; MG/1
1 TABLET ORAL
Status: CANCELLED | OUTPATIENT
Start: 2023-01-26

## 2023-01-26 RX ORDER — GABAPENTIN 300 MG/1
300 CAPSULE ORAL ONCE
Status: COMPLETED | OUTPATIENT
Start: 2023-01-26 | End: 2023-01-26

## 2023-01-26 RX ORDER — ASPIRIN 325 MG
325 TABLET, DELAYED RELEASE (ENTERIC COATED) ORAL 2 TIMES DAILY
Status: CANCELLED | OUTPATIENT
Start: 2023-01-27

## 2023-01-26 RX ORDER — NALOXONE HYDROCHLORIDE 0.4 MG/ML
0.4 INJECTION, SOLUTION INTRAMUSCULAR; INTRAVENOUS; SUBCUTANEOUS AS NEEDED
Status: CANCELLED | OUTPATIENT
Start: 2023-01-26

## 2023-01-26 RX ORDER — CLINDAMYCIN PHOSPHATE 900 MG/50ML
900 INJECTION, SOLUTION INTRAVENOUS ONCE
Status: DISCONTINUED | OUTPATIENT
Start: 2023-01-26 | End: 2023-01-26 | Stop reason: HOSPADM

## 2023-01-26 RX ADMIN — ACETAMINOPHEN 1000 MG: 500 TABLET ORAL at 07:45

## 2023-01-26 RX ADMIN — SODIUM CHLORIDE, POTASSIUM CHLORIDE, SODIUM LACTATE AND CALCIUM CHLORIDE 25 ML/HR: 600; 310; 30; 20 INJECTION, SOLUTION INTRAVENOUS at 07:42

## 2023-01-26 RX ADMIN — GABAPENTIN 300 MG: 300 CAPSULE ORAL at 07:45

## 2023-01-26 NOTE — INTERVAL H&P NOTE
Update History & Physical    The Patient's History and Physical was reviewed with the patient. The patient was examined. There was no change. The surgical site was confirmed by the patient and me. Patient understands and wants to proceed with the procedure. If applicable, I have discussed with the patient / power of  the rationale for blood component transfusion; its benefits in treating or preventing fatigue, organ damage, or death; and its risk which includes mild transfusion reactions, rare risk of blood borne infection, or more serious but rare reactions. I have discussed the alternatives to transfusion, including the risk and consequences of not receiving transfusion. The patient / Villanueva Jatin of  had an opportunity to ask questions and had agreed to proceed with transfusion of blood components. Plan:  The risk, benefits, expected outcome, and alternative to the recommended procedure have been discussed with the patient.       Electronically signed by TRESA Brown on 1/26/2023 at 7:34 AM

## 2023-01-26 NOTE — DISCHARGE INSTRUCTIONS
TOTAL KNEE REPLACEMENT DISCHARGE INFORMATION    You have undergone a Total Knee Replacement. The following list is to provide you with some expectations over the next week upon your discharge from the hospital.     Please begin Aspirin 325mg every 12 hours (twice daily) starting tomorrow as directed until Dr. Anne Castellanos instructs you to discontinue it. If you are not sure which blood thinner to take please contact Dr. Heydi De Luna office next business day for clarification. Please be sure to continue your thigh-high compression stockings on both sides until instructed to discontinue them. Over the course of the next week, you should continue thigh high stockings on the operative leg, DO NOT GET THE INCISION WET until instructed to do so. Please make sure the stockings on the operative leg are pulled up all the way to the thigh to prevent any creases which may result in abrasions or creases in the skin. If the stockings are creating creases resulting in abrasions or blistering on the operative leg please remove the stockings. You may take the stockings off on the nonoperative leg once you arrive home. You may notice some bruising on your thigh and it may extend all the way to the ankles. That is perfectly normal early on. You may experience a clicking noise in your knee and that is normal because of the artificial knee. It is important to remember if you have any surgical procedure including dental procedures which may result in bleeding that an antibiotic 1 hour before the procedure will be required. Please let the provider performing the procedure know that you have artificial joint. If an antibiotic is not given by them please call our office and give us at least 5 business days to get you the appropriate antibiotics if needed. This rule applies indefinitely. If an Ace wrap is placed on your knee you may remove the Ace wrap only 48 hours after your surgery. We will leave the stockings on.   During the course of your  over the next week, should you experience fevers of 101.5 F, a white drainage from the incision, extreme redness around the incision, or the incision begins to have a pungent smell; Please call our office or page Dr. Apurva Mascorro whose numbers are provided in your discharge paperwork. To Page Dr. Apurva Mascorro please call [phone] and dial [phone]. Have the  page whomever is on call for Orthopedics. These are signs of infection and it should be addressed immediately. Please do not drive until instructed to do so. If you need a refill on pain medication please allow at least 2 business days notice for any refills. Immediate refill request may not be possible. Medication refill requests will not be addressed during non-business hours. Please do not page the on-call provider for pain medication refills after hours. It is very important for you to begin your Outpatient Physical Therapy within a couple days of the day of your discharge and your appointment should have been set up. If your physical therapy has not been set up please call our office the next business day for assistance. Details provided in a separate sheet. Remove ace wrap in 48 hrs after surgery but keep stockings on. You may remove the stocking and keep the stocking off on the nonoperative leg. Finish all antibiotics, start the antibiotics as soon as you go home if you have prescribed antibiotics. 14.  You should perform your daily home exercises at least 4 times a day 30 minutes each time. Perform foot pumps on both feet at least 10 times every 15 minutes while awake. This helps prevent swelling in the leg and can help prevent blood clots in the leg. On the operative leg if you have significant swelling you can also lay down flat and put 3 pillows under the heel so the heel is above the heart level and then perform foot pumps 4 times a day for 10 minutes to help bring the swelling down.   15.  Do not place anything under your knee while sleeping at night. Elevate your heel so your  is straight while sleeping at night. 16.  Perform deep breathing exercises 10 times every hour while awake. 17.  If you had a nerve block and you are not having pain the day of the surgery, at nighttime it is okay to take 1 pain medication before going to sleep to help prevent excruciating pain when the nerve block wears off. 18.  You may be given an ice pack machine use that to help prevent swelling. Do not apply heat to the incision area. 19.  While you are awake at least 10 times every 30 minutes move your foot up and down as if you are pumping gas from both feet to help prevent swelling and to promote blood circulation in the calf. 20.  If you develop sudden onset of shortness of breath or severe calf pain please go to closest emergency room. 21. Your pain medicine is a Narcotic and may cause constipation. You may take an over the counter stool softener while taking pain medicine. 25.  You will get surveys either via text message or email after your surgery on a periodic basis. Please participate in the surveys as it helps to track your progress. ICE THERAPY WRAP:    Keep ice therapy wrap on when resting. DO not wear when moving or walking. Ice packs are reusable. Ice Therapy wrap holds two ice packs at a time. Things to watch for:             Increased swelling of the surgical site             Spreading of redness around the incision site             Drainage of pus from the incision site             Developing a fever of 101.5 °F or higher             If any of these symptoms occur you have any questions please contact our office at [phone]. If you need to talk to Dr. Yo Pickens or his staff after hours please call the office and have the on-call service get in touch with the provider on call that day. Please note pain medications are not refilled after hours or on weekends.   If Dr. Yo Pickens or his staff do not call you back within 30 minutes. Please tell the  to try again. Phone: [phone]  www. [employer]

## 2023-01-26 NOTE — ANESTHESIA PREPROCEDURE EVALUATION
Relevant Problems   No relevant active problems       Anesthetic History   No history of anesthetic complications            Review of Systems / Medical History  Patient summary reviewed, nursing notes reviewed and pertinent labs reviewed    Pulmonary        Sleep apnea           Neuro/Psych       CVA  TIA and psychiatric history     Cardiovascular    Hypertension              Exercise tolerance: >4 METS     GI/Hepatic/Renal     GERD           Endo/Other      Hypothyroidism  Obesity and arthritis     Other Findings              Physical Exam    Airway  Mallampati: II  TM Distance: 4 - 6 cm    Mouth opening: Normal     Cardiovascular  Regular rate and rhythm,  S1 and S2 normal,  no murmur, click, rub, or gallop  Rhythm: regular  Rate: normal         Dental  No notable dental hx       Pulmonary  Breath sounds clear to auscultation               Abdominal  Abdominal exam normal       Other Findings            Anesthetic Plan    ASA: 3  Anesthesia type: general - backup, regional and spinal - saphenous block            Anesthetic plan and risks discussed with: Patient

## 2023-01-27 ENCOUNTER — OFFICE VISIT (OUTPATIENT)
Dept: ORTHOPEDIC SURGERY | Age: 63
End: 2023-01-27

## 2023-01-27 ENCOUNTER — APPOINTMENT (OUTPATIENT)
Dept: GENERAL RADIOLOGY | Age: 63
End: 2023-01-27
Attending: NURSE PRACTITIONER
Payer: COMMERCIAL

## 2023-01-27 ENCOUNTER — APPOINTMENT (OUTPATIENT)
Dept: PHYSICAL THERAPY | Age: 63
End: 2023-01-27
Payer: COMMERCIAL

## 2023-01-27 ENCOUNTER — HOSPITAL ENCOUNTER (OUTPATIENT)
Age: 63
Discharge: HOME | End: 2023-01-27
Attending: ORTHOPAEDIC SURGERY | Admitting: ORTHOPAEDIC SURGERY
Payer: COMMERCIAL

## 2023-01-27 ENCOUNTER — ANESTHESIA (OUTPATIENT)
Dept: SURGERY | Age: 63
End: 2023-01-27
Payer: COMMERCIAL

## 2023-01-27 ENCOUNTER — ANESTHESIA EVENT (OUTPATIENT)
Dept: SURGERY | Age: 63
End: 2023-01-27
Payer: COMMERCIAL

## 2023-01-27 VITALS
SYSTOLIC BLOOD PRESSURE: 126 MMHG | OXYGEN SATURATION: 98 % | BODY MASS INDEX: 32.69 KG/M2 | TEMPERATURE: 97 F | DIASTOLIC BLOOD PRESSURE: 74 MMHG | RESPIRATION RATE: 17 BRPM | HEART RATE: 80 BPM | WEIGHT: 241 LBS

## 2023-01-27 DIAGNOSIS — M17.12 PRIMARY OSTEOARTHRITIS OF LEFT KNEE: Primary | ICD-10-CM

## 2023-01-27 PROCEDURE — 77030040361 HC SLV COMPR DVT MDII -B: Performed by: ORTHOPAEDIC SURGERY

## 2023-01-27 PROCEDURE — 77030041690 HC SYS PINNING KN JNJ -D: Performed by: ORTHOPAEDIC SURGERY

## 2023-01-27 PROCEDURE — 76942 ECHO GUIDE FOR BIOPSY: CPT | Performed by: NURSE ANESTHETIST, CERTIFIED REGISTERED

## 2023-01-27 PROCEDURE — 74011000272 HC RX REV CODE- 272: Performed by: ORTHOPAEDIC SURGERY

## 2023-01-27 PROCEDURE — 2709999900 HC NON-CHARGEABLE SUPPLY: Performed by: ORTHOPAEDIC SURGERY

## 2023-01-27 PROCEDURE — 77030006812 HC BLD SAW RECIP STRY -B: Performed by: ORTHOPAEDIC SURGERY

## 2023-01-27 PROCEDURE — 97161 PT EVAL LOW COMPLEX 20 MIN: CPT

## 2023-01-27 PROCEDURE — 77030029372 HC ADH SKN CLSR PRINEO J&J -C: Performed by: ORTHOPAEDIC SURGERY

## 2023-01-27 PROCEDURE — 64450 NJX AA&/STRD OTHER PN/BRANCH: CPT | Performed by: NURSE ANESTHETIST, CERTIFIED REGISTERED

## 2023-01-27 PROCEDURE — 76210000025 HC REC RM PH II 3 TO 3.5 HR: Performed by: ORTHOPAEDIC SURGERY

## 2023-01-27 PROCEDURE — C1776 JOINT DEVICE (IMPLANTABLE): HCPCS | Performed by: ORTHOPAEDIC SURGERY

## 2023-01-27 PROCEDURE — 74011250636 HC RX REV CODE- 250/636: Performed by: NURSE ANESTHETIST, CERTIFIED REGISTERED

## 2023-01-27 PROCEDURE — 77030006835 HC BLD SAW SAG STRY -B: Performed by: ORTHOPAEDIC SURGERY

## 2023-01-27 PROCEDURE — 74011000250 HC RX REV CODE- 250: Performed by: NURSE ANESTHETIST, CERTIFIED REGISTERED

## 2023-01-27 PROCEDURE — 77030031140 HC SUT VCRL3 J&J -A: Performed by: ORTHOPAEDIC SURGERY

## 2023-01-27 PROCEDURE — 77030031139 HC SUT VCRL2 J&J -A: Performed by: ORTHOPAEDIC SURGERY

## 2023-01-27 PROCEDURE — 74011250637 HC RX REV CODE- 250/637: Performed by: NURSE ANESTHETIST, CERTIFIED REGISTERED

## 2023-01-27 PROCEDURE — 77030011266 HC ELECTRD BLD INSL COVD -A: Performed by: ORTHOPAEDIC SURGERY

## 2023-01-27 PROCEDURE — 77030040393 HC DRSG OPTIFOAM GENT MDII -B: Performed by: ORTHOPAEDIC SURGERY

## 2023-01-27 PROCEDURE — 74011250636 HC RX REV CODE- 250/636: Performed by: NURSE PRACTITIONER

## 2023-01-27 PROCEDURE — C1713 ANCHOR/SCREW BN/BN,TIS/BN: HCPCS | Performed by: ORTHOPAEDIC SURGERY

## 2023-01-27 PROCEDURE — 76010000153 HC OR TIME 1.5 TO 2 HR: Performed by: ORTHOPAEDIC SURGERY

## 2023-01-27 PROCEDURE — 77030013708 HC HNDPC SUC IRR PULS STRY –B: Performed by: ORTHOPAEDIC SURGERY

## 2023-01-27 PROCEDURE — 74011000250 HC RX REV CODE- 250: Performed by: ORTHOPAEDIC SURGERY

## 2023-01-27 PROCEDURE — 76210000063 HC OR PH I REC FIRST 0.5 HR: Performed by: ORTHOPAEDIC SURGERY

## 2023-01-27 PROCEDURE — 77030039147 HC PWDR HEMSTS SURGICEL JNJ -D: Performed by: ORTHOPAEDIC SURGERY

## 2023-01-27 PROCEDURE — 77030038692 HC WND DEB SYS IRMX -B: Performed by: ORTHOPAEDIC SURGERY

## 2023-01-27 PROCEDURE — 77030013079 HC BLNKT BAIR HGGR 3M -A: Performed by: NURSE ANESTHETIST, CERTIFIED REGISTERED

## 2023-01-27 PROCEDURE — 74011250637 HC RX REV CODE- 250/637: Performed by: NURSE PRACTITIONER

## 2023-01-27 PROCEDURE — 77030018673: Performed by: ORTHOPAEDIC SURGERY

## 2023-01-27 PROCEDURE — 76060000034 HC ANESTHESIA 1.5 TO 2 HR: Performed by: ORTHOPAEDIC SURGERY

## 2023-01-27 PROCEDURE — 77030007866 HC KT SPN ANES BBMI -B: Performed by: NURSE ANESTHETIST, CERTIFIED REGISTERED

## 2023-01-27 PROCEDURE — 73560 X-RAY EXAM OF KNEE 1 OR 2: CPT

## 2023-01-27 PROCEDURE — 97116 GAIT TRAINING THERAPY: CPT

## 2023-01-27 DEVICE — KNEE K1 TOT HEMI STD CEM IMPL CAPPED SYNTHES: Type: IMPLANTABLE DEVICE | Site: KNEE | Status: FUNCTIONAL

## 2023-01-27 DEVICE — CEMENT BNE GENTAMICIN 40 GM HI VISC GENTAMICIN PALACOS R+G: Type: IMPLANTABLE DEVICE | Site: KNEE | Status: FUNCTIONAL

## 2023-01-27 DEVICE — ATTUNE PATELLA MEDIALIZED DOME 41MM CEMENTED AOX
Type: IMPLANTABLE DEVICE | Site: KNEE | Status: FUNCTIONAL
Brand: ATTUNE

## 2023-01-27 DEVICE — ATTUNE KNEE SYSTEM TIBIAL INSERT ROTATING PLATFORM POSTERIOR STABILIZED 7 5MM AOX
Type: IMPLANTABLE DEVICE | Site: KNEE | Status: FUNCTIONAL
Brand: ATTUNE

## 2023-01-27 DEVICE — ATTUNE KNEE SYSTEM TIBIAL BASE ROTATING PLATFORM SIZE 7 CEMENTED
Type: IMPLANTABLE DEVICE | Site: KNEE | Status: FUNCTIONAL
Brand: ATTUNE

## 2023-01-27 DEVICE — ATTUNE KNEE SYSTEM FEMORAL POSTERIOR STABILIZED SIZE 7 LEFT CEMENTED
Type: IMPLANTABLE DEVICE | Site: KNEE | Status: FUNCTIONAL
Brand: ATTUNE

## 2023-01-27 RX ORDER — SODIUM CHLORIDE 0.9 % (FLUSH) 0.9 %
5-40 SYRINGE (ML) INJECTION EVERY 8 HOURS
Status: DISCONTINUED | OUTPATIENT
Start: 2023-01-27 | End: 2023-01-27 | Stop reason: HOSPADM

## 2023-01-27 RX ORDER — EPHEDRINE SULFATE/0.9% NACL/PF 50 MG/5 ML
SYRINGE (ML) INTRAVENOUS AS NEEDED
Status: DISCONTINUED | OUTPATIENT
Start: 2023-01-27 | End: 2023-01-27 | Stop reason: HOSPADM

## 2023-01-27 RX ORDER — BUPIVACAINE HYDROCHLORIDE 7.5 MG/ML
INJECTION, SOLUTION EPIDURAL; RETROBULBAR AS NEEDED
Status: DISCONTINUED | OUTPATIENT
Start: 2023-01-27 | End: 2023-01-27 | Stop reason: HOSPADM

## 2023-01-27 RX ORDER — ASPIRIN 325 MG
325 TABLET, DELAYED RELEASE (ENTERIC COATED) ORAL 2 TIMES DAILY
Status: CANCELLED | OUTPATIENT
Start: 2023-01-28

## 2023-01-27 RX ORDER — BUPIVACAINE HYDROCHLORIDE AND EPINEPHRINE 2.5; 5 MG/ML; UG/ML
INJECTION, SOLUTION EPIDURAL; INFILTRATION; INTRACAUDAL; PERINEURAL AS NEEDED
Status: DISCONTINUED | OUTPATIENT
Start: 2023-01-27 | End: 2023-01-27 | Stop reason: HOSPADM

## 2023-01-27 RX ORDER — CLINDAMYCIN PHOSPHATE 900 MG/50ML
900 INJECTION, SOLUTION INTRAVENOUS ONCE
Status: COMPLETED | OUTPATIENT
Start: 2023-01-27 | End: 2023-01-27

## 2023-01-27 RX ORDER — PROPOFOL 10 MG/ML
INJECTION, EMULSION INTRAVENOUS
Status: DISCONTINUED | OUTPATIENT
Start: 2023-01-27 | End: 2023-01-27 | Stop reason: HOSPADM

## 2023-01-27 RX ORDER — ACETAMINOPHEN 500 MG
1000 TABLET ORAL ONCE
Status: COMPLETED | OUTPATIENT
Start: 2023-01-27 | End: 2023-01-27

## 2023-01-27 RX ORDER — MIDAZOLAM HYDROCHLORIDE 1 MG/ML
INJECTION, SOLUTION INTRAMUSCULAR; INTRAVENOUS
Status: SHIPPED | OUTPATIENT
Start: 2023-01-27 | End: 2023-01-27

## 2023-01-27 RX ORDER — NALOXONE HYDROCHLORIDE 0.4 MG/ML
0.4 INJECTION, SOLUTION INTRAMUSCULAR; INTRAVENOUS; SUBCUTANEOUS AS NEEDED
Status: CANCELLED | OUTPATIENT
Start: 2023-01-27

## 2023-01-27 RX ORDER — SENNOSIDES 8.6 MG/1
1 TABLET ORAL 2 TIMES DAILY
Status: CANCELLED | OUTPATIENT
Start: 2023-01-27

## 2023-01-27 RX ORDER — ACETAMINOPHEN 325 MG/1
650 TABLET ORAL
Status: CANCELLED | OUTPATIENT
Start: 2023-01-27

## 2023-01-27 RX ORDER — DEXTROSE MONOHYDRATE 100 MG/ML
125-250 INJECTION, SOLUTION INTRAVENOUS AS NEEDED
Status: DISCONTINUED | OUTPATIENT
Start: 2023-01-27 | End: 2023-01-27 | Stop reason: HOSPADM

## 2023-01-27 RX ORDER — FENTANYL CITRATE 50 UG/ML
50 INJECTION, SOLUTION INTRAMUSCULAR; INTRAVENOUS AS NEEDED
Status: DISCONTINUED | OUTPATIENT
Start: 2023-01-27 | End: 2023-01-27 | Stop reason: HOSPADM

## 2023-01-27 RX ORDER — FACIAL-BODY WIPES
10 EACH TOPICAL DAILY PRN
Status: CANCELLED | OUTPATIENT
Start: 2023-01-27

## 2023-01-27 RX ORDER — PHENYLEPHRINE HCL IN 0.9% NACL 1 MG/10 ML
SYRINGE (ML) INTRAVENOUS AS NEEDED
Status: DISCONTINUED | OUTPATIENT
Start: 2023-01-27 | End: 2023-01-27 | Stop reason: HOSPADM

## 2023-01-27 RX ORDER — SODIUM CHLORIDE 0.9 % (FLUSH) 0.9 %
5-40 SYRINGE (ML) INJECTION EVERY 8 HOURS
Status: CANCELLED | OUTPATIENT
Start: 2023-01-27

## 2023-01-27 RX ORDER — BUPIVACAINE HYDROCHLORIDE 5 MG/ML
INJECTION, SOLUTION EPIDURAL; INTRACAUDAL
Status: SHIPPED | OUTPATIENT
Start: 2023-01-27 | End: 2023-01-27

## 2023-01-27 RX ORDER — KETOROLAC TROMETHAMINE 30 MG/ML
30 INJECTION, SOLUTION INTRAMUSCULAR; INTRAVENOUS
Status: COMPLETED | OUTPATIENT
Start: 2023-01-27 | End: 2023-01-27

## 2023-01-27 RX ORDER — IBUPROFEN 200 MG
4 TABLET ORAL AS NEEDED
Status: DISCONTINUED | OUTPATIENT
Start: 2023-01-27 | End: 2023-01-27 | Stop reason: HOSPADM

## 2023-01-27 RX ORDER — FENTANYL CITRATE 50 UG/ML
50 INJECTION, SOLUTION INTRAMUSCULAR; INTRAVENOUS
Status: COMPLETED | OUTPATIENT
Start: 2023-01-27 | End: 2023-01-27

## 2023-01-27 RX ORDER — DIPHENHYDRAMINE HYDROCHLORIDE 50 MG/ML
12.5 INJECTION, SOLUTION INTRAMUSCULAR; INTRAVENOUS
Status: CANCELLED | OUTPATIENT
Start: 2023-01-27

## 2023-01-27 RX ORDER — HYDROCODONE BITARTRATE AND ACETAMINOPHEN 5; 325 MG/1; MG/1
2 TABLET ORAL
Status: DISCONTINUED | OUTPATIENT
Start: 2023-01-27 | End: 2023-01-27 | Stop reason: HOSPADM

## 2023-01-27 RX ORDER — SODIUM CHLORIDE, SODIUM LACTATE, POTASSIUM CHLORIDE, CALCIUM CHLORIDE 600; 310; 30; 20 MG/100ML; MG/100ML; MG/100ML; MG/100ML
25 INJECTION, SOLUTION INTRAVENOUS CONTINUOUS
Status: DISCONTINUED | OUTPATIENT
Start: 2023-01-27 | End: 2023-01-27 | Stop reason: HOSPADM

## 2023-01-27 RX ORDER — SODIUM CHLORIDE 0.9 % (FLUSH) 0.9 %
5-40 SYRINGE (ML) INJECTION AS NEEDED
Status: CANCELLED | OUTPATIENT
Start: 2023-01-27

## 2023-01-27 RX ORDER — SODIUM CHLORIDE 0.9 % (FLUSH) 0.9 %
5-40 SYRINGE (ML) INJECTION AS NEEDED
Status: DISCONTINUED | OUTPATIENT
Start: 2023-01-27 | End: 2023-01-27 | Stop reason: HOSPADM

## 2023-01-27 RX ORDER — DEXTROSE 50 % IN WATER (D50W) INTRAVENOUS SYRINGE
25-50 AS NEEDED
Status: DISCONTINUED | OUTPATIENT
Start: 2023-01-27 | End: 2023-01-27

## 2023-01-27 RX ORDER — DEXAMETHASONE SODIUM PHOSPHATE 4 MG/ML
INJECTION, SOLUTION INTRA-ARTICULAR; INTRALESIONAL; INTRAMUSCULAR; INTRAVENOUS; SOFT TISSUE
Status: SHIPPED | OUTPATIENT
Start: 2023-01-27 | End: 2023-01-27

## 2023-01-27 RX ORDER — GABAPENTIN 300 MG/1
300 CAPSULE ORAL ONCE
Status: COMPLETED | OUTPATIENT
Start: 2023-01-27 | End: 2023-01-27

## 2023-01-27 RX ORDER — ONDANSETRON 2 MG/ML
4 INJECTION INTRAMUSCULAR; INTRAVENOUS
Status: DISCONTINUED | OUTPATIENT
Start: 2023-01-27 | End: 2023-01-27 | Stop reason: HOSPADM

## 2023-01-27 RX ADMIN — FENTANYL CITRATE 50 MCG: 50 INJECTION, SOLUTION INTRAMUSCULAR; INTRAVENOUS at 12:04

## 2023-01-27 RX ADMIN — KETOROLAC TROMETHAMINE 30 MG: 30 INJECTION, SOLUTION INTRAMUSCULAR at 11:55

## 2023-01-27 RX ADMIN — BUPIVACAINE HYDROCHLORIDE 1.7 ML: 7.5 INJECTION, SOLUTION EPIDURAL; RETROBULBAR at 08:36

## 2023-01-27 RX ADMIN — SODIUM CHLORIDE, POTASSIUM CHLORIDE, SODIUM LACTATE AND CALCIUM CHLORIDE 25 ML/HR: 600; 310; 30; 20 INJECTION, SOLUTION INTRAVENOUS at 07:15

## 2023-01-27 RX ADMIN — Medication 10 MG: at 09:34

## 2023-01-27 RX ADMIN — Medication 10 MG: at 09:01

## 2023-01-27 RX ADMIN — HYDROCODONE BITARTRATE AND ACETAMINOPHEN 2 TABLET: 5; 325 TABLET ORAL at 11:04

## 2023-01-27 RX ADMIN — MIDAZOLAM HYDROCHLORIDE 4 MG: 2 INJECTION, SOLUTION INTRAMUSCULAR; INTRAVENOUS at 07:43

## 2023-01-27 RX ADMIN — DEXAMETHASONE SODIUM PHOSPHATE 4 MG: 4 INJECTION, SOLUTION INTRAMUSCULAR; INTRAVENOUS at 07:50

## 2023-01-27 RX ADMIN — ACETAMINOPHEN 1000 MG: 500 TABLET ORAL at 07:14

## 2023-01-27 RX ADMIN — GABAPENTIN 300 MG: 300 CAPSULE ORAL at 07:14

## 2023-01-27 RX ADMIN — BUPIVACAINE HYDROCHLORIDE 20 ML: 5 INJECTION, SOLUTION EPIDURAL; INTRACAUDAL; PERINEURAL at 07:50

## 2023-01-27 RX ADMIN — PROPOFOL 20 MG: 10 INJECTION, EMULSION INTRAVENOUS at 08:56

## 2023-01-27 RX ADMIN — Medication 10 MG: at 09:23

## 2023-01-27 RX ADMIN — PROPOFOL 50 MCG/KG/MIN: 10 INJECTION, EMULSION INTRAVENOUS at 08:37

## 2023-01-27 RX ADMIN — Medication 100 MCG: at 09:00

## 2023-01-27 RX ADMIN — CLINDAMYCIN PHOSPHATE 900 MG: 900 INJECTION, SOLUTION INTRAVENOUS at 08:47

## 2023-01-27 RX ADMIN — FENTANYL CITRATE 50 MCG: 50 INJECTION, SOLUTION INTRAMUSCULAR; INTRAVENOUS at 11:59

## 2023-01-27 RX ADMIN — Medication 10 MG: at 09:32

## 2023-01-27 RX ADMIN — Medication 100 MCG: at 08:52

## 2023-01-27 RX ADMIN — Medication 100 MCG: at 09:01

## 2023-01-27 RX ADMIN — PROPOFOL 20 MG: 10 INJECTION, EMULSION INTRAVENOUS at 08:43

## 2023-01-27 NOTE — PERIOP NOTES
Pt educated on [employer] with verbalized understanding. No questions voiced. Return demonstration done by pt, pt got up to 2000 ml on IS.

## 2023-01-27 NOTE — PERIOP NOTES
1150 Pt complaining of pain to left knee rated a 9/10. Pt restless in bed. CRNA made aware. Orders received for Toradol and fentanyl.      1155 Toradol provided as ordered    1159 50 mcg of fentanyl administered as ordered for pain 9/10 to left knee

## 2023-01-27 NOTE — ANESTHESIA PROCEDURE NOTES
Spinal Block    Start time: 1/27/2023 8:20 AM  End time: 1/27/2023 8:36 AM  Performed by: Shawanda Espana CRNA  Authorized by: Shawanda Espana CRNA     Pre-procedure:   Indications: primary anesthetic  Preanesthetic Checklist: patient identified, risks and benefits discussed, anesthesia consent, site marked, patient being monitored, timeout performed and fire risk safety assessment completed and verbalized    Timeout Time: 08:20 EST      Spinal Block:   Patient Position:  Seated    Prep: Betadine      Location:  L3-4  Technique:  Single shot      Med Admin Time: 1/27/2023 8:36 AM    Needle:   Needle Type:  Pencan  Needle Gauge:  25 G  Attempts:  3      Events: CSF confirmed, no blood with aspiration and no paresthesia        Assessment:  Insertion:  Uncomplicated  Patient tolerance:  Patient tolerated the procedure well with no immediate complications

## 2023-01-27 NOTE — ANESTHESIA PREPROCEDURE EVALUATION
Relevant Problems   No relevant active problems       Anesthetic History   No history of anesthetic complications            Review of Systems / Medical History  Patient summary reviewed, nursing notes reviewed and pertinent labs reviewed    Pulmonary        Sleep apnea           Neuro/Psych       CVA  TIA and psychiatric history     Cardiovascular    Hypertension                   GI/Hepatic/Renal     GERD           Endo/Other      Hypothyroidism  Obesity and arthritis     Other Findings              Physical Exam    Airway  Mallampati: II  TM Distance: 4 - 6 cm         Cardiovascular  Regular rate and rhythm,  S1 and S2 normal,  no murmur, click, rub, or gallop  Rhythm: regular  Rate: normal         Dental  No notable dental hx       Pulmonary  Breath sounds clear to auscultation               Abdominal  Abdominal exam normal       Other Findings            Anesthetic Plan    ASA: 3  Anesthesia type: regional, spinal, MAC and general - backup            Anesthetic plan and risks discussed with: Patient

## 2023-01-27 NOTE — DISCHARGE INSTRUCTIONS
TOTAL KNEE REPLACEMENT DISCHARGE INFORMATION    You have undergone a Total Knee Replacement. The following list is to provide you with some expectations over the next week upon your discharge from the hospital.     Please begin Aspirin 325mg every 12 hours (twice daily) starting tomorrow as directed until Dr. Марина Ramon instructs you to discontinue it. If you are not sure which blood thinner to take please contact Dr. Primo Justice office next business day for clarification. Please be sure to continue your thigh-high compression stockings on both sides until instructed to discontinue them. Over the course of the next week, you should continue thigh high stockings on the operative leg, DO NOT GET THE INCISION WET until instructed to do so. Please make sure the stockings on the operative leg are pulled up all the way to the thigh to prevent any creases which may result in abrasions or creases in the skin. If the stockings are creating creases resulting in abrasions or blistering on the operative leg please remove the stockings. You may take the stockings off on the nonoperative leg once you arrive home. You may notice some bruising on your thigh and it may extend all the way to the ankles. That is perfectly normal early on. You may experience a clicking noise in your knee and that is normal because of the artificial knee. It is important to remember if you have any surgical procedure including dental procedures which may result in bleeding that an antibiotic 1 hour before the procedure will be required. Please let the provider performing the procedure know that you have artificial joint. If an antibiotic is not given by them please call our office and give us at least 5 business days to get you the appropriate antibiotics if needed. This rule applies indefinitely. If an Ace wrap is placed on your knee you may remove the Ace wrap only 48 hours after your surgery. We will leave the stockings on.   During the course of your  over the next week, should you experience fevers of 101.5 F, a white drainage from the incision, extreme redness around the incision, or the incision begins to have a pungent smell; Please call our office or page Dr. Bernadette Lopez whose numbers are provided in your discharge paperwork. To Page Dr. Bernadette Lopez please call [phone] and dial [phone]. Have the  page whomever is on call for Orthopedics. These are signs of infection and it should be addressed immediately. Please do not drive until instructed to do so. If you need a refill on pain medication please allow at least 2 business days notice for any refills. Immediate refill request may not be possible. Medication refill requests will not be addressed during non-business hours. Please do not page the on-call provider for pain medication refills after hours. It is very important for you to begin your Outpatient Physical Therapy within a couple days of the day of your discharge and your appointment should have been set up. If your physical therapy has not been set up please call our office the next business day for assistance. Details provided in a separate sheet. Remove ace wrap in 48 hrs after surgery but keep stockings on. You may remove the stocking and keep the stocking off on the nonoperative leg. Finish all antibiotics, start the antibiotics as soon as you go home if you have prescribed antibiotics. 14.  You should perform your daily home exercises at least 4 times a day 30 minutes each time. Perform foot pumps on both feet at least 10 times every 15 minutes while awake. This helps prevent swelling in the leg and can help prevent blood clots in the leg. On the operative leg if you have significant swelling you can also lay down flat and put 3 pillows under the heel so the heel is above the heart level and then perform foot pumps 4 times a day for 10 minutes to help bring the swelling down.   15.  Do not place anything under your knee while sleeping at night. Elevate your heel so your  is straight while sleeping at night. 16.  Perform deep breathing exercises 10 times every hour while awake. 17.  If you had a nerve block and you are not having pain the day of the surgery, at nighttime it is okay to take 1 pain medication before going to sleep to help prevent excruciating pain when the nerve block wears off. 18.  You may be given an ice pack machine use that to help prevent swelling. Do not apply heat to the incision area. 19.  While you are awake at least 10 times every 30 minutes move your foot up and down as if you are pumping gas from both feet to help prevent swelling and to promote blood circulation in the calf. 20.  If you develop sudden onset of shortness of breath or severe calf pain please go to closest emergency room. 21. Your pain medicine is a Narcotic and may cause constipation. You may take an over the counter stool softener while taking pain medicine. 25.  You will get surveys either via text message or email after your surgery on a periodic basis. Please participate in the surveys as it helps to track your progress. ICE THERAPY WRAP:    Keep ice therapy wrap on when resting. DO not wear when moving or walking. Ice packs are reusable. Ice Therapy wrap holds two ice packs at a time. Things to watch for:             Increased swelling of the surgical site             Spreading of redness around the incision site             Drainage of pus from the incision site             Developing a fever of 101.5 °F or higher             If any of these symptoms occur you have any questions please contact our office at [phone]. If you need to talk to Dr. Yo Pickens or his staff after hours please call the office and have the on-call service get in touch with the provider on call that day. Please note pain medications are not refilled after hours or on weekends.   If Dr. Yo Pickens or his staff do not call you back within 30 minutes. Please tell the  to try again. Phone: [phone]  www. [employer]

## 2023-01-27 NOTE — ANESTHESIA POSTPROCEDURE EVALUATION
Procedure(s):  LEFT TKA.     regional, spinal, MAC    Anesthesia Post Evaluation      Multimodal analgesia: multimodal analgesia used between 6 hours prior to anesthesia start to PACU discharge  Patient location during evaluation: bedside  Patient participation: complete - patient participated  Level of consciousness: awake and alert  Pain score: 0  Airway patency: patent  Anesthetic complications: no  Cardiovascular status: acceptable  Respiratory status: acceptable  Hydration status: acceptable  Post anesthesia nausea and vomiting:  none  Final Post Anesthesia Temperature Assessment:  Normothermia (36.0-37.5 degrees C)      INITIAL Post-op Vital signs:   Vitals Value Taken Time   /73 01/27/23 1015   Temp 36.3 °C (97.4 °F) 01/27/23 1015   Pulse 77 01/27/23 1015   Resp 16 01/27/23 1015   SpO2 96 % 01/27/23 1015

## 2023-01-27 NOTE — PROGRESS NOTES
Problem: Mobility Impaired (Adult and Pediatric)  Goal: *Acute Goals and Plan of Care (Insert Text)  Description: Pt ambulates with MOD (I) and navigates stairs with MOD (I) . PLOF: Community ambulator, no AD, (I) ADLs    Outcome: Resolved/Met     Problem: Patient Education: Go to Patient Education Activity  Goal: Patient/Family Education  Outcome: Resolved/Met   PHYSICAL THERAPY EVALUATION AND DISCHARGE    Patient: Nuris Mascorro (5 y.o. male)  Date: 2023   Start Time:    Stop Time: 5383  $$ Initial PT Evaluation: Low Complex 20 Min  $$ Gait Trainin-22 mins    Primary Diagnosis: Osteoarthritis of left knee, unspecified osteoarthritis type [M17.12]  OA (osteoarthritis) of knee [M17.9]  Procedure(s) (LRB):  LEFT TKA (Left) Day of Surgery   Precautions:  WBAT    ASSESSMENT :  Based on the objective data described below, the patient presents s/p L TKA and he is WBAT. He is able to ambulate with a RW with MOD (I) and he is able to navigate stairs with MOD (I). HE is educated on a HEP and tolerates well. He can return home with outpatient P.T. Patient does not require further skilled intervention at this level of care. PLAN :  Recommendations and Planned Interventions:   No formal PT needs identified at this time. Discharge Recommendations: Outpatient  AM-PAC:   Further Equipment Recommendations for Discharge: RW     SUBJECTIVE:   Patient states my knee is still hurting pretty bad.     OBJECTIVE DATA SUMMARY:     Past Medical History:   Diagnosis Date    Allergic rhinitis, seasonal     Anxiety     LOPEZ-7 was  from     Colon polyps     Dr. Dario Fleming    CVA (cerebral vascular accident) [facility])     Dr. Bojorquez Neither s/p PFO closure, carotids negative    Degenerative arthritis of cervical spine     on mri    Depression     PHQ-9 was  from (); 10/27 from (5/15); lexapro intol; effexor briefly 2020    Diverticulosis     Dyslipidemia     intol lipitor, prava, zocor; tolerated mevacor    FHx: heart disease     GERD (gastroesophageal reflux disease)     egd 10/22 Dr John Vinson mild reflux changes, neg dysplasia or eoe    H/O cardiovascular stress test     ETT neg (5/16)    H/O echocardiogram     (1/18) nl lv, ef 60%, mild dd, mild/mod LVH, tr AI, aortic root 4cm, well seated pfo closure device Dr Dhara Estrada    Hearing loss 2016    right sided; s/p myringotomy Dr Eugene Elena; now seeing diff ENT    Hemorrhoid     Hypertension 03/22/2018    component of white coat    Hypogonadism male 2011    Dr Ebenezer Santillan; dc'ed replacement tx 2015    Hypothyroidism     IFG (impaired fasting glucose) 2011    Nephrolithiasis     Obesity (BMI 30-39.9)     peak weight 245 lbs, bmi 34.7 from 5/15; dec julia supp, med sup wt loss, bariatrics; IF 6/18 start weight 251 but did not do; did keto/low carb 6/18    MARLON (obstructive sleep apnea) 2006    Dr. Ld Garcia    Osteoarthritis of both knees 2017    Peripheral neuropathy 02/2013    on EMG negative serologies    PFO (patent foramen ovale) s/p closure Dr. Antonella Lopez 2006      Past Surgical History:   Procedure Laterality Date    HX COLONOSCOPY      Dr. Keila Anne 2008 polyp; Dr Reid Marine 2/3/12 neg; Dr John Vinson (11/22) neg    HX HEENT  08/2022     thyroid neg    HX HERNIA REPAIR      HX KNEE ARTHROSCOPY Left 3-4 yrs ago    [facility]    HX TONSILLECTOMY       Barriers to Learning/Limitations: None  Compensate with: N/A  Home Situation:   Home Situation  Home Environment: Private residence  Wheelchair Ramp: Yes  One/Two Story Residence: One story  Living Alone: No  Support Systems: Spouse/Significant Other  Patient Expects to be Discharged to[de-identified] Home with outpatient services  Current DME Used/Available at Home: Cane, straight  Critical Behavior:  Neurologic State: Alert  Orientation Level: Oriented X4     Skin Integrity: Incision (comment) (L knee)  Skin Integumentary  Skin Integrity: Incision (comment) (L knee)     Strength:    Strength: Generally decreased, functional (L knee 4/5, SLR 1100, 2.5 hours after spinal)  Tone & Sensation:   Tone: Normal  Sensation: Intact  Coordination:  Coordination: Within functional limits  Range Of Motion:   AROM: Generally decreased, functional (L knee 19-49)    PROM: Generally decreased, functional (L knee 16-51)  Posture:  Posture (WDL): Within defined limits     Functional Mobility:  Bed Mobility:  Rolling: Modified independent  Supine to Sit: Modified independent  Sit to Supine: Modified independent  Scooting: Modified independent  Transfers:  Sit to Stand: Modified independent  Stand to Sit: Modified independent  Balance:   Sitting: Intact  Standing: Intact  Ambulation/Gait Training:  Distance (ft): 220 Feet (ft)  Assistive Device: Walker, rolling  Ambulation - Level of Assistance: Modified independent     Gait Description (WDL): Exceptions to WDL  Gait Abnormalities: Antalgic; Other (decreased speed)     Left Side Weight Bearing: As tolerated  Stairs:  Number of Stairs Trained: 5 (reciprocating)  Stairs - Level of Assistance: Modified independent  Rail Use: Both      AM-PAC:  24/24; Current research shows that an AM-PAC score of 17 or less is typically not associated with a discharge to the patient's home setting, whereas a score of 18 or greater is typically associated with a discharge to the patient's home setting. Today's TX:   Pt is able to ambulate with a RW with MOD (I). HE is able to navigate stairs with MOD (I). He is educated on a HEP and states understanding. Pain:  Pain level pre-treatment: 3-4/10   Pain level post-treatment: 6/10  Pain Location: L knee  Pain Intervention(s): Medication (see MAR); Rest, Ice, Repositioning   Response to intervention: Nurse notified, See doc flow    Activity Tolerance:   Good  Please refer to the flowsheet for vital signs taken during this treatment.   After treatment:   []         Patient left in no apparent distress sitting up in chair  [x]         Patient left in no apparent distress in bed  []         Call bell left within reach  [x]         Nursing notified  []         Caregiver present  []         Bed alarm activated  []         SCDs applied    COMMUNICATION/EDUCATION:   [x]         Role of Physical Therapy in the acute care setting. [x]         Fall prevention education was provided and the patient/caregiver indicated understanding. [x]         Patient/family have participated as able in goal setting and plan of care. [x]         Patient/family agree to work toward stated goals and plan of care. []         Patient understands intent and goals of therapy, but is neutral about his/her participation. []         Patient is unable to participate in goal setting/plan of care: ongoing with therapy staff.  []         Other:     Thank you for this referral.  Joseph Samson, PT, DPT   Time Calculation: 29 mins

## 2023-01-27 NOTE — INTERVAL H&P NOTE
Update History & Physical    The Patient's History and Physical was reviewed with the patient. The patient was examined. There was no change. The surgical site was confirmed by the patient and me. Patient understands and wants to proceed with the procedure. If applicable, I have discussed with the patient / power of  the rationale for blood component transfusion; its benefits in treating or preventing fatigue, organ damage, or death; and its risk which includes mild transfusion reactions, rare risk of blood borne infection, or more serious but rare reactions. I have discussed the alternatives to transfusion, including the risk and consequences of not receiving transfusion. The patient / Kuldip Marts of  had an opportunity to ask questions and had agreed to proceed with transfusion of blood components. Plan:  The risk, benefits, expected outcome, and alternative to the recommended procedure have been discussed with the patient.       Electronically signed by TRESA Zuleta on 1/27/2023 at 7:20 AM

## 2023-01-27 NOTE — ANESTHESIA PROCEDURE NOTES
Peripheral Block    Start time: 1/27/2023 7:42 AM  End time: 1/27/2023 7:51 AM  Performed by: Alok Fajardo CRNA  Authorized by: Alok Fajardo CRNA       Pre-procedure: Indications: post-op pain management    Preanesthetic Checklist: patient identified, risks and benefits discussed, site marked, timeout performed, anesthesia consent given and patient being monitored    Timeout Time: 07:42 MARYELLEN Alfaro RN)      Block Type:   Block Type:   Adductor canal block  Laterality:  Left  Monitoring:  Standard ASA monitoring, responsive to questions, oxygen, continuous pulse ox, frequent vital sign checks and heart rate  Injection Technique:  Single shot  Procedures: ultrasound guided    Patient Position: supine  Prep: chlorhexidine    Location:  Mid thigh  Needle Type:  Ultraplex  Needle Gauge:  20 G  Needle Localization:  Ultrasound guidance  Medication Injected:  Midazolam (VERSED) injection - IntraVENous   4 mg - 1/27/2023 7:43:00 AM  bupivacaine (PF) (MARCAINE) 0.5% injection - Peripheral Nerve Block   20 mL - 1/27/2023 7:50:00 AM  dexamethasone (DECADRON) 4 mg/mL injection - Peripheral Nerve Block   4 mg - 1/27/2023 7:50:00 AM  Med Admin Time: 1/27/2023 7:50 AM    Assessment:  Number of attempts:  1  Injection Assessment:  Incremental injection every 5 mL, no paresthesia, ultrasound image on chart, local visualized surrounding nerve on ultrasound, negative aspiration for blood and no intravascular symptoms  Patient tolerance:  Patient tolerated the procedure well with no immediate complications

## 2023-01-27 NOTE — OP NOTES
Operative Note    Patient: Kiley Johnson MRN: [id number]  Surgery Date: 1/27/2023  [unfilled]          Procedure  Primary Surgeon    LEFT TKA  Parisa Tesfaye MD    * Panel 2 does not exist *  * Panel 2 does not exist *    * Panel 3 does not exist *  * Panel 3 does not exist *     Surgeon(s) and Role:     * Parisa Tesfaye MD - Primary    Other OR Staff/Assistants:  Circ-1: Jeremy Sosa RN  Scrub Tech-1: Zofia Lepe  Scrub Tech-2: Rodolfo Delay  Surg Asst-1: Dilia Beach  Nurse Practitioner: Almarie Bence, ARNP    1st Assistant Tasks:  Closing    Pre-operative Diagnosis:  Osteoarthritis of left knee, unspecified osteoarthritis type [M17.12]    Post-operative Diagnosis: same as preop diagnosis    Anesthesia Type: Spinal     Findings: djd    Complications: No    EBL: 50 cc    Body mass index is 32.69 kg/m².     Specimens: None    Implants       Type Not Specified    Cement Bne Gentamicin 40 Gm Hi Visc Gentamicin [place] R+G - [id number] - Implanted   (Left) Knee      Inventory item: CEMENT BNE GENTAMICIN 40 GM HI VISC GENTAMICIN PALACOS R+G Model/Cat number: [id number]    : [employer] Lot number: [id number]      As of 1/27/2023       Status: Implanted                      Base Tib Sz 7 Abraham Pkt Attune Rp - [id number] - Implanted   (Left) Knee      Inventory item: BASE TIB SZ 7 ABRAHAM PKT ATTUNE RP Model/Cat number: [id number]    : [employer] Lot number: [id number]    Device identifier: [id number] Device identifier type: GS1      GUKENAND Information       Request status Successful        Brand name: ATTUNE Version/Model: 1506-    Company name: Cal Burgess ([place]) MRI safety info as of 1/27/23: Labeling does not contain MRI Safety Information    Contains dry or latex rubber: No      GMDN P.T. name: Uncoated knee tibia prosthesis, metallic                As of 1/27/2023       Status: Implanted                      Component Pat Zzd50es Polyeth Dome Abraham Medialized Attune - [id number] - Implanted   (Left) Knee      Inventory item: COMPONENT PAT BRX15UV POLYETH DOME ABRAHAM MEDIALIZED ATTUNE Model/Cat number: [id number]    : [employer] Lot number: [id number]    Device identifier: [id number] Device identifier type: GS1      GUDID Information       Request status Successful        Brand name: [employer] Version/Model: 1518-    Company name: [employer]) MRI safety info as of 1/27/23: Labeling does not contain MRI Safety Information    Contains dry or latex rubber: No      GMDN P.T. name: Polyethylene patella prosthesis                As of 1/27/2023       Status: Implanted                      Component Fem Sz 7 L Knee Post Stbl Abraham Attune - [id number] - Implanted   (Left) Knee      Inventory item: COMPONENT FEM SZ 7 L KNEE POST STBL ABRAHAM ATTUNE Model/Cat number: [id number]    : [employer] Lot number: [id number]    Device identifier: [id number] Device identifier type: GS1      GUDID Information       Request status Successful        Brand name: [employer] Version/Model: 1504-    Company name: [employer]) MRI safety info as of 1/27/23: Labeling does not contain MRI Safety Information    Contains dry or latex rubber: No      GMDN P.T. name: Uncoated knee femur prosthesis, metallic                As of 1/27/2023       Status: Implanted                      Insert Tib Sz 7 Thk5mm Knee Post Stbl Rot Platfrm Attune - [id number] - Implanted   (Left) Knee      Inventory item: INSERT TIB SZ 7 THK5MM KNEE POST STBL ROT PLATFRM ATTUNE Model/Cat number: [id number]    : [employer] Lot number: [id number]    Device identifier: [id number] Device identifier type: GS1      GUDID Information       Request status Successful        Brand name: [employer] Version/Model: 6857-    Company name: [employer]) MRI safety info as of 1/27/23: Labeling does not contain MRI Safety Information    Contains dry or latex rubber: No      GMDN P.T. name: Tibial insert                As of 1/27/2023       Status: Implanted                               Operative procedure: Total knee replacement    OPERATIVE PROCEDURE:  Please note the first assistant role was to help in patient positioning and draping of the extremity in a sterile fashion. Also during the surgery the assistant's responsibilities included but not limited to extremity positioning during critical portions of the surgery. Assisting in using and placement of retractors during surgery. Lower extremity was prepped and draped in a sterile fashion. After adequate anesthesia was given, the patient was placed in a well-padded supine position. Subvastus arthrotomy from the tibial tubercle to the superior pole of the patella was made. Knee was hyperflexed. Intramedullary reaming of distal femur and proximal tibia was performed. 10 mm of distal femur was cut. Anterior-posterior sizing guide was used. Anterior, posterior, chamfer cuts, and box cuts were made next. Proximal tibial cut and preparation performed. Posterior osteophyte meniscal remnants were removed, and also patella was everted. Free-hand cut of the patella was made. Trial components were placed. The patient was found to have excellent range of motion and stability with all trial components. All the trial components removed. Copious irrigation performed. Distal femur, proximal tibia, and patella were impacted in place. Excessive cement was removed. After the cement was hard, Subvastus arthrotomy closed with Vicryl stitch. Compressive dressing was applied. The patient was taken to PACU in stable condition. Please note due to the patient's BMI of greater than 30 significant surgical effort was required compared to the standard patient with a BMI lower than 30.   Surgical time increased approximately 30% from the normal surgical time due to the patient's high BMI.  Because of the high BMI patient's knee would be considered a complex total knee replacement rather than a standard total knee replacement.       Pavel Montes MD

## 2023-01-27 NOTE — PERIOP NOTES
Pt and wife given discharge instructions with verbalized understanding. No questions voiced. Pt resting now and waiting for PT to come and get him up.

## 2023-01-27 NOTE — PERIOP NOTES
Hilary Ruiz, DPT in to eval pt, teach home exercises, and ambulate pt in hallway. Will continue to monitor. Pt stable.

## 2023-01-28 DIAGNOSIS — Z00.00 PHYSICAL EXAM: ICD-10-CM

## 2023-01-28 DIAGNOSIS — E04.9 GOITER: ICD-10-CM

## 2023-01-28 DIAGNOSIS — E55.9 HYPOVITAMINOSIS D: ICD-10-CM

## 2023-01-28 DIAGNOSIS — E03.4 HYPOTHYROIDISM DUE TO ACQUIRED ATROPHY OF THYROID: ICD-10-CM

## 2023-01-28 RX ORDER — HYDROMORPHONE HYDROCHLORIDE 4 MG/1
4 TABLET ORAL
Qty: 30 TABLET | Refills: 0 | Status: SHIPPED | OUTPATIENT
Start: 2023-01-28 | End: 2023-01-28 | Stop reason: ALTCHOICE

## 2023-01-28 RX ORDER — HYDROMORPHONE HYDROCHLORIDE 2 MG/1
2 TABLET ORAL
Qty: 30 TABLET | Refills: 0 | Status: SHIPPED | OUTPATIENT
Start: 2023-01-28 | End: 2023-02-11

## 2023-01-29 DIAGNOSIS — Z00.00 PHYSICAL EXAM: ICD-10-CM

## 2023-01-30 ENCOUNTER — HOSPITAL ENCOUNTER (OUTPATIENT)
Dept: PHYSICAL THERAPY | Age: 63
Discharge: HOME | End: 2023-01-30
Payer: COMMERCIAL

## 2023-01-30 PROCEDURE — 97162 PT EVAL MOD COMPLEX 30 MIN: CPT

## 2023-01-30 NOTE — PROGRESS NOTES
[facility]  [address] [facility] [address]  [place], [address]  [phone] fax    Plan of Care/ Statement of Necessity for Physical Therapy Services    Patient name: Adore Boland Start of Care: 2023   Referral source: Santino Almazan MD : 1960    Medical Diagnosis: Pain in left knee [M25.562]  Payor: Marita Lin / Plan: Kristie Cruz / Product Type: HMO /  Onset Date:2023    Treatment Diagnosis: Left knee pain s/p TKA   Prior Hospitalization: see medical history Provider#: [id number]   Medications: Verified on Patient summary List    Comorbidities: Anxiety, OA, BMI >30, HTN   Prior Level of Function: Ambulating without device bilateral knee pain     The Plan of Care and following information is based on the information from the initial evaluation. Assessment/ key information: Patient is 57year old male s/p left TKA on 2023. Patient presents to clinic in   transport wheelchair and walker, expressing discouragement over pain levels and perceived severity of functional impairment. TKA for his right knee is scheduled for 3/23/2023. On evaluation patient showing reduced left knee AROM (-12-68 deg) / PROM and strength. Gait is step to with poor left LE weight acceptance using rolling walker but corrects to reciprocal with cues. There is significant edema of the left LE; patient and wife also endorsing pre-morbid swelling. Patient is an excellent candidate for physical therapy to improve upon the aforementioned deficits for return to PLOF. Evaluation Complexity History MEDIUM  Complexity : 1-2 comorbidities / personal factors will impact the outcome/ POC ; Examination MEDIUM Complexity : 3 Standardized tests and measures addressing body structure, function, activity limitation and / or participation in recreation  ;Presentation MEDIUM Complexity : Evolving with changing characteristics  ; Clinical Decision Making MEDIUM Complexity : FOTO score of 26-74  Overall Complexity Rating: MEDIUM  Problem List: pain affecting function, decrease ROM, decrease strength, edema affecting function, impaired gait/ balance, decrease ADL/ functional abilitiies, decrease activity tolerance, decrease flexibility/ joint mobility, and decrease transfer abilities   Treatment Plan may include any combination of the following: Therapeutic exercise, Neuromuscular reeducation, Manual therapy, Therapeutic activity, Self care/home management, and Vasopneumatic device  Patient / Family readiness to learn indicated by: asking questions and interest  Persons(s) to be included in education: patient (P)  Barriers to Learning/Limitations: None  Patient Goal (s): Walk with less pain  Patient Self Reported Health Status: good  Rehabilitation Potential: good    Short Term Goals: To be accomplished in 2 weeks:  Patient to be independent in HEP to maximize therapeutic effect of care plan. Patient to improve left knee flexion to 90 deg for greater ease of functional transfers. Long Term Goals: To be accomplished in 4 weeks:  Patient to improve left knee flexion to 115 deg and extension to 0 deg for greater ease of sit to stand and ambulation. Patient to improve left knee strength MMT to 4+/5 extension and 4/5 flexion for greater ease of stair negotiation. Patient to demonstrate and report ambulation with absent-mild gait deviations in home and community settings without assistive device to facilitate community re-integration. Patient to improve FOTO score to 47 to indicate progression towards PLOF. Frequency / Duration: Patient to be seen 2-3 times per week for 4 weeks.     Patient/ Caregiver education and instruction: Diagnosis, prognosis, self care, activity modification, and exercises   [x]  Plan of care has been reviewed with PTA    Jonathan Caballero, PT 1/30/2023 9:56 AM    ________________________________________________________________________    I certify that the above Therapy Services are being furnished while the patient is under my care. I agree with the treatment plan and certify that this therapy is necessary.     [de-identified] Signature:____________Date:_________TIME:________     Jeannine Almeida MD  ** Signature, Date and Time must be completed for valid certification **    Please sign and return to [facility] [address] [place] [address] & [facility]  [address]  [place], [address]  [phone] fax

## 2023-01-30 NOTE — PROGRESS NOTES
PT DAILY TREATMENT NOTE     Patient Name: Kalen Fya  CKIE:  : 1960  [x]  Patient  Verified  Payor: Sonia Walsh / Plan: Janee Pallas / Product Type: HMO /    In time:10:08  Out time:11:00  Total Treatment Time (min): 52  Visit #: 1 of     Treatment Area: Pain in left knee [M25.562]    SUBJECTIVE  Pain Level (0-10 scale): 8  Any medication changes, allergies to medications, adverse drug reactions, diagnosis change, or new procedure performed?: [x] No    [] Yes (see summary sheet for update)  Subjective functional status/changes:   [] No changes reported  Agreeable to initial evaluation. OBJECTIVE    20 min [x]Eval                  []Re-Eval     22 min Therapeutic Exercise:  HEP instruction   Rationale: increase ROM and increase strength to improve the patients ability to perform functional transfers and ADLs. 10 min Therapeutic Activity:  Education and practice of technique for negotiating single step into den    Rationale: increase strength, improve coordination, and improve balance  to improve the patients ability to safely access all areas of his home. With   [x] TE   [x] TA   [] neuro   [] other: Patient Education: [x] Review HEP    [] Progressed/Changed HEP based on:   [] positioning   [] body mechanics   [] transfers   [] heat/ice application    [] other:      Other Objective/Functional Measures: See Evaluation     Pain Level (0-10 scale) post treatment: 9    ASSESSMENT/Changes in Function: Patient is 57year old male s/p left TKA on 2023. Patient presents to clinic in transport wheelchair and walker, expressing discouragement over pain levels and perceived severity of functional impairment. TKA for his right knee is scheduled for 3/23/2023. On evaluation patient showing reduced left knee AROM (-12-68 deg) / PROM and strength. Gait is step to with poor left LE weight acceptance using rolling walker but corrects to reciprocal with cues.  There is significant edema of the left LE; patient and wife also endorsing pre-morbid swelling. Patient is an excellent candidate for physical therapy to improve upon the aforementioned deficits for return to PLOF. [x]  See Plan of Care  []  See progress note/recertification  []  See Discharge Summary         Progress towards goals / Updated goals:     Short Term Goals: To be accomplished in 2 weeks:  Patient to be independent in HEP to maximize therapeutic effect of care plan. Evaluation: issued and instructed    Patient to improve left knee flexion to 90 deg for greater ease of functional transfers. Evaluation: 68 deg flexion      Long Term Goals: To be accomplished in 4 weeks:  Patient to improve left knee flexion to 115 deg and extension to 0 deg for greater ease of sit to stand and ambulation. Evaluation: -12 deg extension, 68 deg flexion    Patient to improve left knee strength MMT to 4+/5 extension and 4/5 flexion for greater ease of stair negotiation. Evaluation: 2+/5 extension, 2/5 flexion    Patient to demonstrate and report ambulation with absent-mild gait deviations in home and community settings without assistive device to facilitate community re-integration. Evaluation: currently ambulating with RW step to pattern, absent heel strike, minimal WB on left LE    Patient to improve FOTO score to 47 to indicate progression towards PLOF.    Evaluation: 32    PLAN  []  Upgrade activities as tolerated     []  Continue plan of care  []  Update interventions per flow sheet       []  Discharge due to:_  []  Other:_      Araseli Arellano, PT 1/30/2023  9:57 AM    Future Appointments   Date Time Provider Jake Heck   1/30/2023 10:00 AM Lacey Nath PT [facility]   2/2/2023 11:00 AM TRESA Gonzales BS AMB

## 2023-02-01 DIAGNOSIS — Z96.652 STATUS POST TOTAL LEFT KNEE REPLACEMENT: Primary | ICD-10-CM

## 2023-02-01 DIAGNOSIS — F41.9 ANXIETY: ICD-10-CM

## 2023-02-02 ENCOUNTER — OFFICE VISIT (OUTPATIENT)
Dept: ORTHOPEDIC SURGERY | Age: 63
End: 2023-02-02
Payer: COMMERCIAL

## 2023-02-02 DIAGNOSIS — M25.562 LEFT KNEE PAIN, UNSPECIFIED CHRONICITY: Primary | ICD-10-CM

## 2023-02-02 RX ORDER — NALOXONE HYDROCHLORIDE 4 MG/.1ML
SPRAY NASAL
COMMUNITY
Start: 2023-01-24

## 2023-02-02 RX ORDER — FACIAL-BODY WIPES
10 EACH TOPICAL
Qty: 10 EACH | Refills: 1 | Status: SHIPPED | OUTPATIENT
Start: 2023-02-02

## 2023-02-02 RX ORDER — TRIAMCINOLONE ACETONIDE 1 MG/G
CREAM TOPICAL
Qty: 15 G | Refills: 0 | Status: SHIPPED | OUTPATIENT
Start: 2023-02-02

## 2023-02-02 NOTE — PATIENT INSTRUCTIONS
Knee Pain or Injury: Care Instructions  Overview     Injuries are a common cause of knee problems. Sudden (acute) injuries may be caused by a direct blow to the knee. They can also be caused by abnormal twisting, bending, or falling on the knee. Pain, bruising, or swelling may be severe, and may start within minutes of the injury. Overuse is another cause of knee pain. Other causes are climbing stairs, kneeling, and other activities that use the knee. Everyday wear and tear, especially as you get older, also can cause knee pain. Rest, along with home treatment, often relieves pain and allows your knee to heal. If you have a serious knee injury, you may need tests and treatment. Follow-up care is a key part of your treatment and safety. Be sure to make and go to all appointments, and call your doctor if you are having problems. It's also a good idea to know your test results and keep a list of the medicines you take. How can you care for yourself at home? Be safe with medicines. Read and follow all instructions on the label. If the doctor gave you a prescription medicine for pain, take it as prescribed. If you are not taking a prescription pain medicine, ask your doctor if you can take an over-the-counter medicine. Rest and protect your knee. Take a break from any activity that may cause pain. Put ice or a cold pack on your knee for 10 to 20 minutes at a time. Put a thin cloth between the ice and your skin. Prop up a sore knee on a pillow when you ice it or anytime you sit or lie down for the next 3 days. Try to keep it above the level of your heart. This will help reduce swelling. If your knee is not swollen, you can put moist heat, a heating pad, or a warm cloth on your knee. If your doctor recommends an elastic bandage, sleeve, or other type of support for your knee, wear it as directed. Follow your doctor's instructions about how much weight you can put on your leg.  Use a cane, crutches, or a walker as instructed. Follow your doctor's instructions about activity during your healing process. If you can do mild exercise, slowly increase your activity. Stay at a healthy weight. Extra weight can strain the joints, especially the knees and hips, and make the pain worse. Losing a few pounds may help. When should you call for help? Call 911 anytime you think you may need emergency care. For example, call if:    You have symptoms of a blood clot in your lung (called a pulmonary embolism). These may include:  Sudden chest pain. Trouble breathing. Coughing up blood. Call your doctor now or seek immediate medical care if:    You have severe or increasing pain. Your leg or foot turns cold or changes color. You cannot stand or put weight on your knee. Your knee looks twisted or bent out of shape. You cannot move your knee. You have signs of infection, such as: Increased pain, swelling, warmth, or redness. Red streaks leading from the knee. Pus draining from a place on your knee. A fever. You have signs of a blood clot in your leg (called a deep vein thrombosis), such as:  Pain in your calf, back of the knee, thigh, or groin. Redness and swelling in your leg or groin. Watch closely for changes in your health, and be sure to contact your doctor if:    You have tingling, weakness, or numbness in your knee. You have any new symptoms, such as swelling. You have bruises from a knee injury that last longer than 2 weeks. You do not get better as expected. Where can you learn more? Go to http://www.gray.com/  Enter K195 in the search box to learn more about \"Knee Pain or Injury: Care Instructions. \"  Current as of: March 9, 2022               Content Version: 13.4  © 8566-8422 [employer]. Care instructions adapted under license by [employer] (which disclaims liability or warranty for this information).  If you have questions about a medical condition or this instruction, always ask your healthcare professional. [place] [address] any warranty or liability for your use of this information.

## 2023-02-02 NOTE — PROGRESS NOTES
Name: Cale Blas    : 1960     Service Dept: [facility]    Chief Complaint   Patient presents with    Knee Pain        There were no vitals taken for this visit. Allergies   Allergen Reactions    Amoxicillin (Bulk) Unknown (comments)     Rash around collar area    Codeine Other (comments)     Other reaction(s): other/intolerance    Cymbalta [Duloxetine] Other (comments)     Gi upset    Erythromycin Diarrhea    Lexapro [Escitalopram Oxalate] Other (comments)     Gi upset      Lipitor [Atorvastatin] Myalgia    Penicillins Rash and Itching    Pravastatin Myalgia    Septra [Sulfamethoprim Ds] Unknown (comments)    Simvastatin Myalgia        Current Outpatient Medications   Medication Sig Dispense Refill    naloxone (NARCAN) 4 mg/actuation nasal spray Give 1 spray into 1 nostril. Give additional doses using a new nasal spray with each dose, if patient does not respond or relapses into respiratory depression. Call 911. Additional doses may be given every 2 to 3 minutes until medical assistance arrives. lisinopriL (PRINIVIL, ZESTRIL) 2.5 mg tablet TAKE 1 TABLET BY MOUTH ONCE DAILY FOR HIGH BLOOD PRESSURE 90 Tablet 3    HYDROmorphone (Dilaudid) 2 mg tablet Take 1 Tablet by mouth every four to six (4-6) hours as needed for Pain for up to 14 days. Max Daily Amount: 12 mg. This Rx is to only be used after surgical procedure 30 Tablet 0    ondansetron (ZOFRAN ODT) 4 mg disintegrating tablet Take 1 Tablet by mouth every eight (8) hours as needed for Nausea, Vomiting or Nausea or Vomiting for up to 20 doses. 20 Tablet 0    diazePAM (VALIUM) 10 mg tablet TAKE 1 TABLET BY MOUTH EVERY 12 HOURS AS NEEDED FOR ANXIETY . DO NOT EXCEED 2 PER 24 HOURS  Indications: anxious 30 Tablet 0    levothyroxine (SYNTHROID) 100 mcg tablet TAKE 1 TABLET BY MOUTH ONCE DAILY BEFORE BREAKFAST 90 Tablet 3    rosuvastatin (Crestor) 20 mg tablet Take 1 Tablet by mouth nightly.  90 Tablet 3 acetaminophen (TYLENOL) 500 mg tablet Take 500 mg by mouth every four (4) hours as needed. fluticasone propionate (FLONASE) 50 mcg/actuation nasal spray Use 2 spray(s) in each nostril once daily 48 g 3    aspirin delayed-release 81 mg tablet Take 81 mg by mouth daily. Patient Active Problem List   Diagnosis Code    CVA Dr. Sandie Cross 2006 I63.9    MARLON (obstructive sleep apnea) G47.33    Anxiety and depression 2011 F41.9    Colon polyps and diverticulosis Dr. Bertha Granados 2008 K63.5    Hypogonadism Dr. Laura Caldera 2011 E29.1    Dyslipidemia E78.5    Hypovitaminosis D E55.9    Neuropathy G62.9    Hypothyroidism due to acquired atrophy of thyroid E03.4    Gastroesophageal reflux disease without esophagitis K21.9    IFG (impaired fasting glucose) R73.01    Primary osteoarthritis involving multiple joints Dr Pete Cheng M15.9    Essential hypertension I10    Obesity (BMI 30.0-34. 9) E66.9    Complex tear of medial meniscus of left knee as current injury S80.12A    OA (osteoarthritis) of knee M17.9      Family History   Problem Relation Age of Onset    Hypertension Mother     Cancer Mother         renal and kisney cancer    Arthritis-rheumatoid Mother     Hypertension Father     Heart Disease Father       Social History     Socioeconomic History    Marital status:     Number of children: 2   Occupational History    Occupation: auto parts worker   Tobacco Use    Smoking status: Never    Smokeless tobacco: Never   Vaping Use    Vaping Use: Never used   Substance and Sexual Activity    Alcohol use: No    Drug use: No    Sexual activity: Yes     Partners: Female      Past Surgical History:   Procedure Laterality Date    HX COLONOSCOPY      Dr. Bertha Granados 2008 polyp; Dr Beatriz Mcgregor 2/3/12 neg; Dr Kayla Yi (11/22) neg    HX HEENT  08/2022     thyroid neg    HX HERNIA REPAIR      HX KNEE ARTHROSCOPY Left 3-4 yrs ago    [address]        Past Medical History:   Diagnosis Date    Allergic rhinitis, seasonal     Anxiety     LOPEZ-7 was 11/21 from 7/11    Colon polyps 2008    Dr. Suri Bo    CVA (cerebral vascular accident) [facility]) 2006    Dr. Gutierrez Prchuck s/p PFO closure, carotids negative    Degenerative arthritis of cervical spine 2013    on mri    Depression     PHQ-9 was 7/27 from (7/11); 10/27 from (5/15); lexapro intol; effexor briefly 2020    Diverticulosis     Dyslipidemia     intol lipitor, prava, zocor; tolerated mevacor    FHx: heart disease     GERD (gastroesophageal reflux disease)     egd 10/22 Dr Marlo Sanchez mild reflux changes, neg dysplasia or eoe    H/O cardiovascular stress test     ETT neg (5/16)    H/O echocardiogram     (1/18) nl lv, ef 60%, mild dd, mild/mod LVH, tr AI, aortic root 4cm, well seated pfo closure device Dr Mati Rivera    Hearing loss 2016    right sided; s/p myringotomy Dr Bryant Farnsworth; now seeing diff ENT    Hemorrhoid     Hypertension 03/22/2018    component of white coat    Hypogonadism male 2011    Dr Maryann Roberto; dc'ed replacement tx 2015    Hypothyroidism     IFG (impaired fasting glucose) 2011    Nephrolithiasis     Obesity (BMI 30-39.9)     peak weight 245 lbs, bmi 34.7 from 5/15; dec julia supp, med sup wt loss, bariatrics; IF 6/18 start weight 251 but did not do; did keto/low carb 6/18    MARLON (obstructive sleep apnea) 2006    Dr. Willem Moser    Osteoarthritis of both knees 2017    Peripheral neuropathy 02/2013    on EMG negative serologies    PFO (patent foramen ovale) s/p closure Dr. Frutoso Gowers 2006         I have reviewed and agree with [facility] and intake form in chart and the record furthermore I have reviewed prior medical record(s) regarding this patients care during this appointment. Review of Systems:   Patient is a pleasant appearing individual, appropriately dressed, well hydrated, well nourished, who is alert, appropriately oriented for age, and in no acute distress with a wheelchair bound gait and normal affect who does not appear to be in any significant pain.     Physical Exam: Right Knee -Decrease range of motion with flexion, Knee arc of greater than 50 degrees, Some crepitation, Grossly neurovascularly intact, Good cap refill, No skin lesion, Moderate swelling, some gross instability, Some quadriceps weakness, Kellgren and Romulo at least grade 3      Encounter Diagnoses     ICD-10-CM ICD-9-CM   1. Left knee pain, unspecified chronicity  M25.562 719.46       HPI:  The patient is here status post left total knee replacement. His surgery was on 1/27/2023. He has been having some difficulty with swelling. Ultrasound study of the left leg was unremarkable. He has only gone to therapy one time. He has not followed through on trying to get the second therapy session. I was extremely cautious with him in letting him know that being in a wheelchair is not ideal situation. He needs to ambulate, he needs to do his exercises, and he needs to continue physical therapy. Assessment/Plan: We will give him Dulcolax suppository, Dilaudid refill only next week, and rash cream in the meantime. We will go from there. As part of continued conservative pain management options the patient was advised to utilize Tylenol or OTC NSAIDS as long as it is not medically contraindicated. Return to Office:          Scribed by Rob Castellon LPN as dictated by [facility] ADELINE Orellana MD.  Documentation, performed by, True and Accepted Magdaleno Orellana MD

## 2023-02-03 ENCOUNTER — HOSPITAL ENCOUNTER (OUTPATIENT)
Dept: PHYSICAL THERAPY | Age: 63
Discharge: HOME | End: 2023-02-03
Payer: COMMERCIAL

## 2023-02-03 DIAGNOSIS — Z96.652 STATUS POST TOTAL LEFT KNEE REPLACEMENT: ICD-10-CM

## 2023-02-03 PROCEDURE — 97112 NEUROMUSCULAR REEDUCATION: CPT

## 2023-02-03 PROCEDURE — 97110 THERAPEUTIC EXERCISES: CPT

## 2023-02-03 PROCEDURE — 97530 THERAPEUTIC ACTIVITIES: CPT

## 2023-02-03 NOTE — PROGRESS NOTES
PT DAILY TREATMENT NOTE     Patient Name: Kye Huber  [id number]  : 1960  [x]  Patient  Verified  Payor: Neela Hernandez / Plan: [employer] / Product Type: HMO /    In time:10:53A  Out time:11:37A  Total Treatment Time (min): 44  Visit #: 2 of     Medicare/BCBS Only   Total Timed Codes (min):  44 1:1 Treatment Time:  39       Treatment Area: Left knee pain [M25.562]    SUBJECTIVE  Pain Level (0-10 scale): 5  Any medication changes, allergies to medications, adverse drug reactions, diagnosis change, or new procedure performed?: [x] No    [] Yes (see summary sheet for update)  Subjective functional status/changes:   [] No changes reported  Pt reports having MD follow up yesterday, in which MD not pleased with progress so far. Reports not being compliant with HEP, however anticipates beginning after today's session.      OBJECTIVE     20 min Therapeutic Exercise:  [x] See flow sheet :   Rationale: increase ROM and increase strength to improve the patients ability to perform ADLs with greater ease    10 min Therapeutic Activity:  [x]  See flow sheet :   Rationale: increase ROM, increase strength, and improve coordination  to improve the patients ability to perform functional transfers with greater ease and safety      14 min Neuromuscular Re-education:  [x]  See flow sheet :   Rationale: increase strength, improve coordination, improve balance, and increase proprioception  to improve the patients ability to perform IADLs with greater ease          With   [x] TE   [x] TA   [x] neuro   [] other: Patient Education: [x] Review HEP    [] Progressed/Changed HEP based on:   [] positioning   [] body mechanics   [] transfers   [] heat/ice application    [x] other: Ed on importance of completing HEP and icing following today's session - pt verbalizes knowledge and understanding      Other Objective/Functional Measures:   Introduced ex program per flow sheet    Observation: Increased leftt knee swelling, skin temp WNL, no signs of infection  Left Knee AROM: - 8 - 68 deg    Pain Level (0-10 scale) post treatment: 4    ASSESSMENT/Changes in Function:   Introduced activity program per POC w/ emphasis on mobility ex secondary to significant AROM deficits at this time. Pt w/ good tolerance and very motivated, completing all anticipated activities w/ positive response evident through observable improvements in mobility and gait pattern post tx. Pt ed (see above) provided. Will continue to progress activity program as able and tolerated. Patient will continue to benefit from skilled PT services to modify and progress therapeutic interventions, address functional mobility deficits, address ROM deficits, address strength deficits, analyze and address soft tissue restrictions, analyze and cue movement patterns, analyze and modify body mechanics/ergonomics, assess and modify postural abnormalities, and instruct in home and community integration to attain remaining goals. [x]  See Plan of Care  []  See progress note/recertification  []  See Discharge Summary         Progress towards goals / Updated goals:  Short Term Goals: To be accomplished in 2 weeks:  Patient to be independent in HEP to maximize therapeutic effect of care plan. Current: Has not initiated yet, 02/03/23  Patient to improve left knee flexion to 90 deg for greater ease of functional transfers. Current: Left Knee AROM: - 8 - 68 deg, 02/03/23     Long Term Goals: To be accomplished in 4 weeks:  Patient to improve left knee flexion to 115 deg and extension to 0 deg for greater ease of sit to stand and ambulation. Patient to improve left knee strength MMT to 4+/5 extension and 4/5 flexion for greater ease of stair negotiation. Patient to demonstrate and report ambulation with absent-mild gait deviations in home and community settings without assistive device to facilitate community re-integration.   Patient to improve FOTO score to 47 to indicate progression towards PLOF.      PLAN  [x]  Upgrade activities as tolerated     [x]  Continue plan of care  [x]  Update interventions per flow sheet       []  Discharge due to:_  []  Other:_      Elías Leal DPT 2/3/2023  11:16 AM    Future Appointments   Date Time Provider Jake Heck   2/9/2023  9:15 AM TRESA Gilliam AMB Oriented - self; Oriented - place; Oriented - time

## 2023-02-05 RX ORDER — DIAZEPAM 10 MG/1
TABLET ORAL
Qty: 30 TABLET | Refills: 0 | Status: SHIPPED | OUTPATIENT
Start: 2023-02-05

## 2023-02-07 ENCOUNTER — HOSPITAL ENCOUNTER (OUTPATIENT)
Dept: PHYSICAL THERAPY | Age: 63
Discharge: HOME | End: 2023-02-07
Payer: COMMERCIAL

## 2023-02-07 PROCEDURE — 97110 THERAPEUTIC EXERCISES: CPT

## 2023-02-07 PROCEDURE — 97112 NEUROMUSCULAR REEDUCATION: CPT

## 2023-02-07 PROCEDURE — 97530 THERAPEUTIC ACTIVITIES: CPT

## 2023-02-07 NOTE — PROGRESS NOTES
PT DAILY TREATMENT NOTE     Patient Name: Drake Chaves  CLUQ:  : 1960  [x]  Patient  Verified  Payor: Damari Montana / Plan: Val Siddiqui / Product Type: HMO /    In time:740  Out time:835  Total Treatment Time (min): 55  Visit #: 3 of 12    Medicare/BCBS Only   Total Timed Codes (min):  45 1:1 Treatment Time:  45       Treatment Area: Left knee pain [M25.562]    SUBJECTIVE  Pain Level (0-10 scale): 4  Any medication changes, allergies to medications, adverse drug reactions, diagnosis change, or new procedure performed?: [x] No    [] Yes (see summary sheet for update)  Subjective functional status/changes:   [] No changes reported  \"It's about a 4 after walking in across the parking lot. \"  Reports he still has a lot of edema in the left leg/calf which limits his ability to perform heel slide and get increased knee ROM.   OBJECTIVE    Modality rationale: decrease edema, decrease inflammation, decrease pain, and increase tissue extensibility to improve the patients ability to tolerate ADLS and activities   Min Type Additional Details    [] Estim:  []Unatt       []IFC  []Premod                        []Other:  []w/ice   []w/heat  Position:  Location:    [] Estim: []Att    []TENS instruct  []NMES                    []Other:  []w/US   []w/ice   []w/heat  Position:  Location:    []  Traction: [] Cervical       []Lumbar                       [] Prone          []Supine                       []Intermittent   []Continuous Lbs:  [] before manual  [] after manual    []  Ultrasound: []Continuous   [] Pulsed                           []1MHz   []3MHz W/cm2:  Location:    []  Iontophoresis with dexamethasone         Location: [] Take home patch   [] In clinic   10 [x]  Ice   post  []  heat  []  Ice massage  []  Laser   []  Anodyne Position: reclined  Location:left knee    []  Laser with stim  []  Other:  Position:  Location:    []  Vasopneumatic Device    []  Right     []  Left  Pre-treatment girth:  Post-treatment girth:  Measured at (location):  Pressure:       [] lo [] med [] hi   Temperature: [] lo [] med [] hi   [] Skin assessment post-treatment:  []intact []redness- no adverse reaction    []redness - adverse reaction:          24 min Therapeutic Exercise:  [x] See flow sheet :   Rationale: increase ROM, increase strength, and improve coordination to improve the patients ability to tolerate ADLS and activities    8 min Therapeutic Activity:  [x]  See flow sheet :   Rationale: increase ROM, increase strength, and improve coordination  to improve the patients ability to tolerate ADLs and activities      13 min Neuromuscular Re-education:  []  See flow sheet :   Rationale: increase ROM, increase strength, and improve coordination  to improve the patients ability to tolerate ADLs and activities      PT assist scar massage, patellar mobs, light effleurage, ROM F/E  in supine with hip at 90 degrees obtained 90 degrees F           With   [x] TE   [x] TA   [x] neuro   [] other: Patient Education: [x] Review HEP    [] Progressed/Changed HEP based on:   [x] positioning   [] body mechanics   [] transfers   [x] heat/ice application    [] other:      Other Objective/Functional Measures: VC exercises and technique   Nustep increased to level 2 X 6 minutes  Heelslide 80 degrees     Sit to stands 10x with hand on thighs (from low plinth)      Pain Level (0-10 scale) post treatment: 4 \"with ice\"    ASSESSMENT/Changes in Function: tolerated well. Continues with edema left knee. ROM F increased to 80 with heel slide and 90 PT assist ROM in supine. Continues with RW for safety and due to reports of LOM and edema.      Patient will continue to benefit from skilled PT services to modify and progress therapeutic interventions, address functional mobility deficits, address ROM deficits, address strength deficits, analyze and address soft tissue restrictions, analyze and cue movement patterns, analyze and modify body mechanics/ergonomics, assess and modify postural abnormalities, and instruct in home and community integration to attain remaining goals. [x]  See Plan of Care  []  See progress note/recertification  []  See Discharge Summary         Progress towards goals / Updated goals:  Short Term Goals: To be accomplished in 2 weeks:  Patient to be independent in HEP to maximize therapeutic effect of care plan. Current: Has initiated 2x day 2/7/23  Patient to improve left knee flexion to 90 deg for greater ease of functional transfers. Current: Left Knee AROM: - 8 - 68 deg, 02/03/23     heel slide 80, with PT Assist F 90 2/7/23     Long Term Goals: To be accomplished in 4 weeks:  Patient to improve left knee flexion to 115 deg and extension to 0 deg for greater ease of sit to stand and ambulation. CURRENT     heel slide 80, with PT Assist F 90 2/7/23     Patient to improve left knee strength MMT to 4+/5 extension and 4/5 flexion for greater ease of stair negotiation. CURRENT ongoing NA 2/7/23  Patient to demonstrate and report ambulation with absent-mild gait deviations in home and community settings without assistive device to facilitate community re-integration. CURRENT continues with RW mod I for safety and relates due to decrease ROM  from edema  2/7/23  Patient to improve FOTO score to 47 to indicate progression towards PLOF.    CURRENT ongoing NA 2/7/23       PLAN  [x]  Upgrade activities as tolerated     [x]  Continue plan of care  []  Update interventions per flow sheet       []  Discharge due to:_  [x]  Other:_ virtual follow up on Thursday      Celi Jensen PT 2/7/2023  7:43 AM    Future Appointments   Date Time Provider Jake Heck   2/7/2023  8:00 AM Laura Thakur, PT [facility]   2/9/2023  9:15 AM TRESA Abebe BS AMB   2/10/2023  3:30 PM Mindy Samayoa PTA [facility]

## 2023-02-09 ENCOUNTER — VIRTUAL VISIT (OUTPATIENT)
Dept: ORTHOPEDIC SURGERY | Age: 63
End: 2023-02-09
Payer: COMMERCIAL

## 2023-02-09 DIAGNOSIS — M17.12 PRIMARY OSTEOARTHRITIS OF LEFT KNEE: ICD-10-CM

## 2023-02-09 DIAGNOSIS — Z96.652 STATUS POST LEFT KNEE REPLACEMENT: Primary | ICD-10-CM

## 2023-02-09 PROCEDURE — 99024 POSTOP FOLLOW-UP VISIT: CPT | Performed by: NURSE PRACTITIONER

## 2023-02-09 RX ORDER — HYDROMORPHONE HYDROCHLORIDE 2 MG/1
2 TABLET ORAL
Qty: 30 TABLET | Refills: 0 | Status: SHIPPED | OUTPATIENT
Start: 2023-02-09 | End: 2023-02-17

## 2023-02-09 NOTE — PROGRESS NOTES
Subjective:      Patient presents for postop care following left TKA. Surgery was on 1/27/2023. Ambulating with a walker/cane. Pain is controlled with current analgesics. Medication(s) being used: Dilaudid. Has begun PT and states that he doing the home exercises. Still complains of swelling, but overall better. Objective: There were no vitals taken for this visit. General:  alert, cooperative, no distress, appears stated age   ROM: -10/90   Incision:   healing well, no drainage, no erythema, incision well approximated, moderate swelling     Assessment:     Postoperative course complicated by delay in PT. Plan:     1. Continue PT/home exercises; will get him a nighttime extension splint. 2. Wound care/showering discussed. 3. Continue DVT prophylaxis as directed. 4. Follow up in 4 weeks to reassess ROM. Lindwood Castleman, who was evaluated through a synchronous (real-time) audio-video encounter, and/or his healthcare decision maker, is aware that it is a billable service, with coverage as determined by his insurance carrier. He provided verbal consent to proceed: Yes, and patient identification was verified. It was conducted pursuant to the emergency declaration under the [facility], [address] [place] [address] authority and the [employer] Act. A caregiver was present when appropriate. Ability to conduct physical exam was limited. I was in the office. The patient was at home.

## 2023-02-10 ENCOUNTER — HOSPITAL ENCOUNTER (OUTPATIENT)
Dept: PHYSICAL THERAPY | Age: 63
Discharge: HOME | End: 2023-02-10
Payer: COMMERCIAL

## 2023-02-10 PROCEDURE — 97535 SELF CARE MNGMENT TRAINING: CPT

## 2023-02-10 PROCEDURE — 97530 THERAPEUTIC ACTIVITIES: CPT

## 2023-02-10 PROCEDURE — 97110 THERAPEUTIC EXERCISES: CPT

## 2023-02-10 PROCEDURE — 97112 NEUROMUSCULAR REEDUCATION: CPT

## 2023-02-10 NOTE — PROGRESS NOTES
PT DAILY TREATMENT NOTE     Patient Name: Rhett Richards  VCCX:  : 1960  [x]  Patient  Verified  Payor: Melanie Narvaez / Plan: Nakul Current / Product Type: HMO /    In time:330 pm  Out time:430 pm   Total Treatment Time (min): 60   Visit #: 4 of 12    Medicare/BCBS Only   Total Timed Codes (min):  50  1:1 Treatment Time:  50        Treatment Area: Left knee pain [M25.562]    SUBJECTIVE  Pain Level (0-10 scale): 2/10   Any medication changes, allergies to medications, adverse drug reactions, diagnosis change, or new procedure performed?: [x] No    [] Yes (see summary sheet for update)  Subjective functional status/changes:   [] No changes reported  Patient reports soreness and swelling in his knee today. Patient states that he has a hard time sleeping because of both knees. OBJECTIVE    Modality rationale: decrease edema, decrease inflammation, decrease pain, and increase tissue extensibility to improve the patients ability to assist with performing ADL's and functional tasks without limitations.     Min Type Additional Details    [] Estim:  []Unatt       []IFC  []Premod                        []Other:  []w/ice   []w/heat  Position:  Location:    [] Estim: []Att    []TENS instruct  []NMES                    []Other:  []w/US   []w/ice   []w/heat  Position:  Location:    []  Traction: [] Cervical       []Lumbar                       [] Prone          []Supine                       []Intermittent   []Continuous Lbs:  [] before manual  [] after manual    []  Ultrasound: []Continuous   [] Pulsed                           []1MHz   []3MHz W/cm2:  Location:    []  Iontophoresis with dexamethasone         Location: [] Take home patch   [] In clinic   10 [x]  Ice     []  heat  []  Ice massage  []  Laser   []  Anodyne Position: supine  Location:anterior and posterior left knee    []  Laser with stim  []  Other:  Position:  Location:    []  Vasopneumatic Device    []  Right     []  Left  Pre-treatment girth:  Post-treatment girth:  Measured at (location):  Pressure:       [] lo [] med [] hi   Temperature: [] lo [] med [] hi   [] Skin assessment post-treatment:  []intact []redness- no adverse reaction  []redness - adverse reaction:       12 min Therapeutic Exercise:  [] See flow sheet :   Rationale: increase ROM and increase strength to improve the patients overall muscle endurance and activity tolerance for ADL's and functional tasks. 10 min Therapeutic Activity:  []  See flow sheet :   Rationale: increase strength and improve coordination  to improve the patients ability to safely perform dynamic activities and to improve functional performance of  ADL's. 20 min Neuromuscular Re-education:  []  See flow sheet :   Rationale: increase strength, improve coordination, and improve balance  to improve the patients ability to perform activities with good form, stability and improve proprioception. Effleurage/STM to left knee and patellar mobs. 8 min Self Care/Home Management: Proper sequence with SPC   Rationale: increase ROM, increase strength, improve coordination, and improve balance  to improve the patients ability to ambulate safely in the community and at home. With   [] TE   [] TA   [] neuro   [] other: Patient Education: [x] Review HEP    [] Progressed/Changed HEP based on:   [] positioning   [] body mechanics   [] transfers   [] heat/ice application    [] other:      Other Objective/Functional Measures: Added ball at wall, march, LAQ, hamstring curl with TB and HR/TR    Left knee AROM: -2-85 degrees    Left knee PROM: 0-95 degrees     Pain Level (0-10 scale) post treatment: 4/10     ASSESSMENT/Changes in Function:   Patient presented to PT with SPC. Patient's SPC required an adjustment and patient was instructed on proper sequencing for safety. Patient demonstrated understanding. Progressed exercises, as per flow sheet, to assist with improving left knee ROM and strength.  Patient tolerated today's progressed exercises well. Increased swelling and ecchymosis noted throughout patient's left LE. Increased left knee pain was reported at the end of today's session. Will continue to progress patient as tolerated. Patient will continue to benefit from skilled PT services to modify and progress therapeutic interventions, address functional mobility deficits, address ROM deficits, address strength deficits, analyze and address soft tissue restrictions, analyze and cue movement patterns, analyze and modify body mechanics/ergonomics, assess and modify postural abnormalities, address imbalance/dizziness, and instruct in home and community integration to attain remaining goals. []  See Plan of Care  []  See progress note/recertification  []  See Discharge Summary         Progress towards goals / Updated goals:  Short Term Goals: To be accomplished in 2 weeks:  Patient to be independent in HEP to maximize therapeutic effect of care plan. Current: Has initiated 2x day 2/7/23  Patient to improve left knee flexion to 90 deg for greater ease of functional transfers. Current:Left knee AROM: -2-85 degrees; Left knee PROM: 0-95 degrees. 2/10/23      Long Term Goals: To be accomplished in 4 weeks:  Patient to improve left knee flexion to 115 deg and extension to 0 deg for greater ease of sit to stand and ambulation. Current:Left knee AROM: -2-85 degrees; Left knee PROM: 0-95 degrees. 2/10/23      Patient to improve left knee strength MMT to 4+/5 extension and 4/5 flexion for greater ease of stair negotiation. CURRENT ongoing NA 2/7/23  Patient to demonstrate and report ambulation with absent-mild gait deviations in home and community settings without assistive device to facilitate community re-integration. CURRENT- Patient presented to PT with SPC. Patient's SPC required an adjustment and patient was instructed on proper sequencing for safety.  2/10/2023  Patient to improve FOTO score to 47 to indicate progression towards PLOF.    CURRENT ongoing NA 2/10/23    PLAN  [x]  Upgrade activities as tolerated     [x]  Continue plan of care  []  Update interventions per flow sheet       []  Discharge due to:_  []  Other:_      Ariadne Muñiz PTA 2/10/2023  12:53 PM    Future Appointments   Date Time Provider Jake Heck   2/10/2023  3:30 PM Elana Craven PTA [facility]

## 2023-02-14 ENCOUNTER — HOSPITAL ENCOUNTER (OUTPATIENT)
Facility: HOSPITAL | Age: 63
Setting detail: RECURRING SERIES
Discharge: HOME | End: 2023-02-17
Payer: COMMERCIAL

## 2023-02-14 PROCEDURE — 97112 NEUROMUSCULAR REEDUCATION: CPT

## 2023-02-14 PROCEDURE — 97530 THERAPEUTIC ACTIVITIES: CPT

## 2023-02-14 PROCEDURE — 97110 THERAPEUTIC EXERCISES: CPT

## 2023-02-17 ENCOUNTER — HOSPITAL ENCOUNTER (OUTPATIENT)
Facility: HOSPITAL | Age: 63
Setting detail: RECURRING SERIES
Discharge: HOME | End: 2023-02-20
Payer: COMMERCIAL

## 2023-02-17 PROCEDURE — 97530 THERAPEUTIC ACTIVITIES: CPT

## 2023-02-17 PROCEDURE — 97110 THERAPEUTIC EXERCISES: CPT

## 2023-02-17 NOTE — PROGRESS NOTES
[address] [address] THERAPY  [address] [place] [address]  FC:[phone] WX:[phone]  PHYSICAL THERAPY PROGRESS NOTE  Patient Name: Joe Eaton : 1960   Treatment/Medical Diagnosis: No admission diagnoses are documented for this encounter. Referral Source: Ricardo Madsen MD     Date of Initial Visit: 2023 Attended Visits: 6 Missed Visits: 0     SUMMARY OF TREATMENT  PT intervention has consisted of therapeutic exercise, functional activities, manual therapy, neuromuscular re-education and HEP to improve left knee ROM and strength to improve patient's ability to perform ADL's, job related tasks and household duties. Cold pack is utilized for pain management. CURRENT STATUS  Patient has been seen for all 6 authorized treatments and has shown good progress with PT. Patient reports 50%  improvement with PT. Pain level on average is 4/10 on pain scale. FOTO assessment score has improved since last assessed. Patient's walking tolerance, overall left knee pain and swelling has improved since the start of PT. Patient continues to have increased difficulty with standing for long periods of time, decreased functional strength and decreased left knee ROM . Functional Gains: Ambulation, overall pain, swelling  Functional Deficits: Standing tolerance, strength and mobility   % improvement: 50% improvement   Pain   Average: 4/10                  Best: 0/10                Worst: 7-8/10  Patient Goal: \"To improve range of motion and swelling. \"      Left knee ROM: -3- 98 degrees     Short Term Goals: To be accomplished in 2 weeks:  Patient to be independent in HEP to maximize therapeutic effect of care plan. Current: Patient reports compliance 3-4 times a day. MET      Patient to improve left knee flexion to 90 deg for greater ease of functional transfers. Current: Left knee ROM: -3-98 degrees. MET      Long Term Goals:  To be accomplished in 4 weeks:  Patient to improve left knee flexion to 115 deg and extension to 0 deg for greater ease of sit to stand and ambulation. Current: Left knee ROM: -3-98 degrees. Progressing     Patient to improve left knee strength MMT to 4+/5 extension and 4/5 flexion for greater ease of stair negotiation. CURRENT: Left knee strength MMT to 4+/5 extension and 4/5 flexion. MET      Patient to demonstrate and report ambulation with absent-mild gait deviations in home and community settings without assistive device to facilitate community re-integration. CURRENT- Patient able to ambulate with both no AD and SPC with no gait deviations. MET      Patient to improve FOTO score to 47 to indicate progression towards PLOF. CURRENT- 54 points. MET     New Goals to be achieved in __5__   1. Patient to improve left knee flexion to 115 deg and extension to 0 deg for greater ease of sit to stand and ambulation. PN: Left knee ROM: -3-98 degrees. 2. Patient will be able negotiate 6\" stairs with no HR and reciprocal pattern in order to improve left quad functional strength in the community and at home. PN: Not initiated due to increase swelling. 3. Patient will tolerate walking a mile with no increase in left knee pain in order to return patient to recreational activities before surgery. PN: Patient able to walk approx 100 yards with no AD or increase in left knee pain. RECOMMENDATIONS  PT recommending to continue with current POC 2/week for an additional 5 weeks to improve left knee strength, ROM and to decreased swelling in order to tolerate ADL's, job related tasks and household duties with ease. If you have any questions/comments please contact us directly at [phone]. Thank you for allowing us to assist in the care of your patient.     MARICEL WONG, PTA       2/17/2023       8:51 AM

## 2023-02-17 NOTE — PROGRESS NOTES
PHYSICAL / OCCUPATIONAL THERAPY - DAILY TREATMENT NOTE (updated )    Patient Name: Lili Mcfarland    Date: 2023    : 1960  Insurance: Payor: Gisselle Edmonds 150 / Plan: Tiara Wynne / Product Type: *No Product type* /      Patient  verified Yes     Visit #   Current / Total 6 12   Time   In / Out 1033 am 1115 am    Pain   In / Out 0/10 3/10    Subjective Functional Status/Changes: Patient reports that his knee is feeling better overall. Patient states thst his knee was hurting this morning but feel better after taking medicine this morning. \"I walked down to the stop sign by my house and I have been stretching my knee on the tailgate of my truck. \"    Changes to:  Meds, Allergies, Med Hx, Sx Hx? If yes, update Summary List no       TREATMENT AREA =  No admission diagnoses are documented for this encounter. OBJECTIVE    Modalities Rationale:     decrease edema, decrease inflammation, and decrease pain to improve patient's ability to progress to PLOF and address remaining functional goals. min [] Estim Unattended, type/location:                                      []  w/ice    []  w/heat    min [] Estim Attended, type/location:                                     []  w/US     []  w/ice    []  w/heat    []  TENS insruct      min []  Mechanical Traction: type/lbs                   []  pro   []  sup   []  int   []  cont    []  before manual    []  after manual    min []  Ultrasound, settings/location:      min []  Iontophoresis w/ dexamethasone, location:                                               []  take home patch       []  in clinic   10 min  unbilled [x]  Ice     []  Heat    location/position: Left knee with patient in the reclined position. min []  Paraffin,  details:     min []  Vasopneumatic Device, press/temp:     min []  Adis Harmon / Shanique Chery:     If using vaso (only need to measure limb vaso being performed on)      pre-treatment girth :       post-treatment girth :       measured at (landmark location) :      min []  Other:    Skin assessment post-treatment (if applicable):    []  intact    []  redness- no adverse reaction                 []redness - adverse reaction:         Therapeutic Procedures: Tx Min Billable or 1:1 Min (if diff from Tx Min) Procedure, Rationale, Specifics    10 [id number] Therapeutic Exercise (timed):  increase ROM, strength, coordination, balance, and proprioception to improve patient's ability to progress to PLOF and address remaining functional goals. (see flow sheet as applicable)     Details if applicable:        22 [id number] Therapeutic Activity (timed):  use of dynamic activities replicating functional movements to increase ROM, strength, coordination, balance, and proprioception in order to improve patient's ability to progress to PLOF and address remaining functional goals. (see flow sheet as applicable)     Details if applicable:            Details if applicable:            Details if applicable:      28 [facility] Totals Reminder: bill using total billable min of TIMED therapeutic procedures (example: do not include dry needle or estim unattended, both untimed codes, in totals to left)  8-22 min = 1 unit; 23-37 min = 2 units; 38-52 min = 3 units; 53-67 min = 4 units; 68-82 min = 5 units   Total Total     [x]  Patient Education billed concurrently with other procedures   [x] Review HEP    [] Progressed/Changed HEP, detail:    [] Other detail:       Objective Information/Functional Measures/Assessment  Functional Gains: Ambulation, overall pain, swelling  Functional Deficits: Standing tolerance, strength and mobility   % improvement: 50% improvement   Pain   Average: 4/10       Best: 0/10     Worst: 7-8/10  Patient Goal: \"To improve range of motion and swelling. \"     Left knee ROM: -3- 98 degrees     Patient will continue to benefit from skilled PT / OT services to modify and progress therapeutic interventions, analyze and address functional mobility deficits, analyze and address ROM deficits, analyze and address strength deficits, analyze and address soft tissue restrictions, analyze and cue for proper movement patterns, analyze and address imbalance/dizziness, and instruct in home and community integration to address functional deficits and attain remaining goals. Progress toward goals / Updated goals:  []  See Progress Note/Recertification  Short Term Goals: To be accomplished in 2 weeks:  Patient to be independent in HEP to maximize therapeutic effect of care plan. Current: Patient reports compliance 3-4 times a day. MET     Patient to improve left knee flexion to 90 deg for greater ease of functional transfers. Current: Left knee ROM: -3-98 degrees. Progressing     Long Term Goals: To be accomplished in 4 weeks:  Patient to improve left knee flexion to 115 deg and extension to 0 deg for greater ease of sit to stand and ambulation. Current: Left knee ROM: -3-98 degrees. Progressing     Patient to improve left knee strength MMT to 4+/5 extension and 4/5 flexion for greater ease of stair negotiation. CURRENT: Left knee strength MMT to 4+/5 extension and 4/5 flexion. MET       Patient to demonstrate and report ambulation with absent-mild gait deviations in home and community settings without assistive device to facilitate community re-integration. CURRENT- Patient able to ambulate with both no AD and SPC with no gait deviations. MET     Patient to improve FOTO score to 47 to indicate progression towards PLOF. CURRENT- 54 points.  MET        PLAN  Yes  Continue plan of care  []  Upgrade activities as tolerated  []  Discharge due to :  []  Other:     Adam Au, PTA    2/17/2023    1:27 PM    Future Appointments   Date Time Provider Jaimie Jci   3/3/2023  9:45 AM RO Ashford BS AMB   3/16/2023  3:20 PM MD CRISTOPHER Ramires AMB   3/23/2023  7:00 AM MD CRISTOPHER Ramires AMB   7/25/2023  8:05 AM IO LAB VISIT [facility] MARILYNN AMB   8/1/2023  8:00 AM Joey Joyce MD [facility] BS AMB

## 2023-02-21 ENCOUNTER — TELEPHONE (OUTPATIENT)
Age: 63
End: 2023-02-21

## 2023-02-21 DIAGNOSIS — Z96.651 STATUS POST RIGHT KNEE REPLACEMENT: Primary | ICD-10-CM

## 2023-02-21 NOTE — TELEPHONE ENCOUNTER
Patient said his physical therapy ended Friday and they sent off an authorization to his insurance requesting to add ten more visits. He is asking is there anyway we can push this too? Therapy has not sent us a letter requesting more visits. .. Teresa Kunz he had a TKR on 1/27/23.

## 2023-02-24 RX ORDER — DIAZEPAM 5 MG/1
5 TABLET ORAL EVERY 6 HOURS PRN
COMMUNITY

## 2023-02-24 RX ORDER — ROSUVASTATIN CALCIUM 10 MG/1
10 TABLET, COATED ORAL DAILY
COMMUNITY
End: 2023-03-03

## 2023-02-24 RX ORDER — LEVOTHYROXINE SODIUM 0.1 MG/1
100 TABLET ORAL DAILY
COMMUNITY
End: 2023-03-03

## 2023-02-24 RX ORDER — LISINOPRIL 20 MG/1
20 TABLET ORAL DAILY
COMMUNITY
End: 2023-03-03

## 2023-02-24 RX ORDER — ASPIRIN 81 MG/1
81 TABLET ORAL DAILY
COMMUNITY
End: 2023-03-03

## 2023-02-28 ENCOUNTER — HOSPITAL ENCOUNTER (OUTPATIENT)
Facility: HOSPITAL | Age: 63
Setting detail: RECURRING SERIES
Discharge: HOME | End: 2023-03-03
Payer: COMMERCIAL

## 2023-02-28 DIAGNOSIS — F41.9 ANXIETY: Primary | ICD-10-CM

## 2023-02-28 PROCEDURE — 97110 THERAPEUTIC EXERCISES: CPT

## 2023-02-28 PROCEDURE — 97112 NEUROMUSCULAR REEDUCATION: CPT

## 2023-02-28 PROCEDURE — 97530 THERAPEUTIC ACTIVITIES: CPT

## 2023-02-28 NOTE — PROGRESS NOTES
PHYSICAL / OCCUPATIONAL THERAPY - DAILY TREATMENT NOTE (updated )    Patient Name: Lucrecia Hankins    Date: 2023    : 1960  Insurance: Payor: Landon Pod / Plan: Ashlyn Siemens / Product Type: *No Product type* /      Patient  verified Yes     Visit #   Current / Total 1 10   Time   In / Out 1055 am 1154 am    Pain   In / Out 1/10 0/10    Subjective Functional Status/Changes: Patient explains that he was issued a brace to assist with improving his left knee extension. Patient states that he has used it the last three nights and his knee was hurting this morning from it. Patient reports that he took some meds this morning before coming to PT. Changes to:  Meds, Allergies, Med Hx, Sx Hx? If yes, update Summary List no       TREATMENT AREA =  Pain in left knee [M25.562]    OBJECTIVE    Modalities Rationale:     decrease edema, decrease inflammation, decrease pain, and increase tissue extensibility to improve patient's ability to progress to PLOF and address remaining functional goals. min [] Estim Unattended, type/location:                                      []  w/ice    []  w/heat    min [] Estim Attended, type/location:                                     []  w/US     []  w/ice    []  w/heat    []  TENS insruct      min []  Mechanical Traction: type/lbs                   []  pro   []  sup   []  int   []  cont    []  before manual    []  after manual    min []  Ultrasound, settings/location:      min []  Iontophoresis w/ dexamethasone, location:                                               []  take home patch       []  in clinic   10 min  unbill [x]  Ice     []  Heat    location/position: Reclined; anterior and posterior knee    min []  Paraffin,  details:     min []  Vasopneumatic Device, press/temp:     min []  Marcy Muckle / Salina Pack:     If using vaso (only need to measure limb vaso being performed on)      pre-treatment girth :       post-treatment girth :       measured at (landmark location) :      min []  Other:    Skin assessment post-treatment (if applicable):    []  intact    []  redness- no adverse reaction                 []redness - adverse reaction:         Therapeutic Procedures: Tx Min Billable or 1:1 Min (if diff from Tx Min) Procedure, Rationale, Specifics   20 20 97110 Therapeutic Exercise (timed):  increase ROM, strength, coordination, balance, and proprioception to improve patient's ability to progress to PLOF and address remaining functional goals. (see flow sheet as applicable)     Details if applicable:       14 14 97530 Therapeutic Activity (timed):  use of dynamic activities replicating functional movements to increase ROM, strength, coordination, balance, and proprioception in order to improve patient's ability to progress to PLOF and address remaining functional goals. (see flow sheet as applicable)     Details if applicable:     15 15 62029 Neuromuscular Re-Education (timed):  improve balance, coordination, kinesthetic sense, posture, core stability and proprioception to improve patient's ability to develop conscious control of individual muscles and awareness of position of extremities in order to progress to PLOF and address remaining functional goals.  (see flow sheet as applicable)     Details if applicable:  DTM/STM to left knee, patellar mobs, PROM into extension           Details if applicable:            Details if applicable:     52 47 [facility] Totals Reminder: bill using total billable min of TIMED therapeutic procedures (example: do not include dry needle or estim unattended, both untimed codes, in totals to left)  8-22 min = 1 unit; 23-37 min = 2 units; 38-52 min = 3 units; 53-67 min = 4 units; 68-82 min = 5 units   Total Total     [x]  Patient Education billed concurrently with other procedures   [x] Review HEP    [] Progressed/Changed HEP, detail:    [] Other detail:       Objective Information/Functional Measures/Assessment   Added 6\" stairs, hip 3 way Progressed reps of marches, HR     Left knee PROM: 0-101 degrees    Patient presented with decreased left knee pain today. Progressed exercises, as per flow sheet, to assist with increasing left knee strength and stability. Patient tolerated today's progressed exercise well. Patient's left knee ROM continues to improve with PT. Discussed with patient to ways to improve left knee extension lag. Patient verbalized understanding. Decreased left knee pain was reported at the end of today's session. Will continue to progress patient as tolerated. Patient will continue to benefit from skilled PT / OT services to modify and progress therapeutic interventions, analyze and address functional mobility deficits, analyze and address ROM deficits, analyze and address strength deficits, analyze and address soft tissue restrictions, analyze and cue for proper movement patterns, analyze and address imbalance/dizziness, and instruct in home and community integration to address functional deficits and attain remaining goals. Progress toward goals / Updated goals:  []  See Progress Note/Recertification  1. Patient to improve left knee flexion to 115 deg and extension to 0 deg for greater ease of sit to stand and ambulation. PN: Left knee ROM: -3-98 degrees. Current: Left knee PROM: 0-101 degrees. 2/28/2023     2. Patient will be able negotiate 6\" stairs with no HR and reciprocal pattern in order to improve left quad functional strength in the community and at home. PN: Not initiated due to increase swelling. Current: Initiated 6\" stairs x 2, (B) HR and reciprocal pattern. 2/28/2023     3. Patient will tolerate walking a mile with no increase in left knee pain in order to return patient to recreational activities before surgery. PN: Patient able to walk approx 100 yards with no AD or increase in left knee pain.        PLAN  Yes  Continue plan of care  []  Upgrade activities as tolerated  []  Discharge due to :  [] Other:    Tonja Daubs, PTA    2/28/2023    11:14 AM    Future Appointments   Date Time Provider Jaimie Dunbar   3/3/2023  9:45 AM RO Gudino BS AMB   3/3/2023 12:00 PM Tonja Daubs, PTA [facility]   3/7/2023  2:00 PM Tonja Daubs, PTA [facility]   3/9/2023 10:00 AM Tonja Daubs, PTA [facility]   3/14/2023 10:00 AM Tonja Daubs, PTA [facility]   3/16/2023  8:30 AM Tonja Daubs, PTA [facility]   3/16/2023  2:45 PM [facility] PAT ONE CLICK [facility]   9/76/4161  3:20 PM Sahra Mcdonald MD SOS BS AMB   3/23/2023  7:00 AM Shravan Lamas MD SOS[facility]RISTOPHER BS AMB   7/25/2023  8:05 AM [facility] LAB VISIT IO BS AMB   8/1/2023  8:00 AM Pretty Hancock MD IO BS AMB

## 2023-03-03 ENCOUNTER — TELEMEDICINE (OUTPATIENT)
Age: 63
End: 2023-03-03

## 2023-03-03 ENCOUNTER — ANESTHESIA EVENT (OUTPATIENT)
Age: 63
End: 2023-03-03
Payer: COMMERCIAL

## 2023-03-03 ENCOUNTER — HOSPITAL ENCOUNTER (OUTPATIENT)
Facility: HOSPITAL | Age: 63
Setting detail: RECURRING SERIES
Discharge: HOME | End: 2023-03-06
Payer: COMMERCIAL

## 2023-03-03 DIAGNOSIS — Z96.652 STATUS POST LEFT KNEE REPLACEMENT: Primary | ICD-10-CM

## 2023-03-03 PROCEDURE — 97112 NEUROMUSCULAR REEDUCATION: CPT

## 2023-03-03 PROCEDURE — 99024 POSTOP FOLLOW-UP VISIT: CPT | Performed by: NURSE PRACTITIONER

## 2023-03-03 PROCEDURE — 97110 THERAPEUTIC EXERCISES: CPT

## 2023-03-03 PROCEDURE — 97530 THERAPEUTIC ACTIVITIES: CPT

## 2023-03-03 RX ORDER — DIAZEPAM 10 MG/1
TABLET ORAL
Qty: 30 TABLET | Refills: 0 | Status: SHIPPED | OUTPATIENT
Start: 2023-03-03 | End: 2023-04-02

## 2023-03-03 RX ORDER — HYDROMORPHONE HYDROCHLORIDE 2 MG/1
TABLET ORAL
COMMUNITY
Start: 2023-02-10

## 2023-03-03 RX ORDER — GABAPENTIN 300 MG/1
300 CAPSULE ORAL
Qty: 30 CAPSULE | Refills: 0 | Status: SHIPPED | OUTPATIENT
Start: 2023-03-03 | End: 2023-04-02

## 2023-03-03 NOTE — PROGRESS NOTES
PHYSICAL / OCCUPATIONAL THERAPY - DAILY TREATMENT NOTE (updated )    Patient Name: Damien Thomas    Date: 3/3/2023    : 1960  Insurance: Payor: Gisselle Edmonds 150 / Plan: Noble Calles / Product Type: *No Product type* /      Patient  verified Yes     Visit #   Current / Total 2 10   Time   In / Out 1203 pm  1258 pm     Pain   In / Out 0/10  0/10    Subjective Functional Status/Changes: Patient reports that his knee feels good today. Patient explains that he had a virtual  follow up appointment with the PA this morning. Changes to:  Meds, Allergies, Med Hx, Sx Hx? If yes, update Summary List no       TREATMENT AREA =  Pain in left knee [M25.562]    OBJECTIVE    Modalities Rationale:     decrease edema, decrease inflammation, decrease pain, and increase tissue extensibility to improve patient's ability to progress to PLOF and address remaining functional goals. min [] Estim Unattended, type/location:                                      []  w/ice    []  w/heat    min [] Estim Attended, type/location:                                     []  w/US     []  w/ice    []  w/heat    []  TENS insruct      min []  Mechanical Traction: type/lbs                   []  pro   []  sup   []  int   []  cont    []  before manual    []  after manual    min []  Ultrasound, settings/location:      min []  Iontophoresis w/ dexamethasone, location:                                               []  take home patch       []  in clinic   10 min  unbill [x]  Ice     []  Heat    location/position: Reclined; anterior and posterior knee    min []  Paraffin,  details:     min []  Vasopneumatic Device, press/temp:     min []  Rosa Han / Armando Zaidi:     If using vaso (only need to measure limb vaso being performed on)      pre-treatment girth :       post-treatment girth :       measured at (landmark location) :      min []  Other:    Skin assessment post-treatment (if applicable):    []  intact    []  redness- no adverse reaction []redness - adverse reaction:         Therapeutic Procedures: Tx Min Billable or 1:1 Min (if diff from Tx Min) Procedure, Rationale, Specifics   20 20 [id number] Therapeutic Exercise (timed):  increase ROM, strength, coordination, balance, and proprioception to improve patient's ability to progress to PLOF and address remaining functional goals. (see flow sheet as applicable)     Details if applicable:       10 10 04679 Therapeutic Activity (timed):  use of dynamic activities replicating functional movements to increase ROM, strength, coordination, balance, and proprioception in order to improve patient's ability to progress to PLOF and address remaining functional goals. (see flow sheet as applicable)     Details if applicable:     15 15 63311 Neuromuscular Re-Education (timed):  improve balance, coordination, kinesthetic sense, posture, core stability and proprioception to improve patient's ability to develop conscious control of individual muscles and awareness of position of extremities in order to progress to PLOF and address remaining functional goals. (see flow sheet as applicable)     Details if applicable:  DTM/STM to left knee, patellar mobs, PROM into extension           Details if applicable:            Details if applicable:     41 45 [facility] Totals Reminder: bill using total billable min of TIMED therapeutic procedures (example: do not include dry needle or estim unattended, both untimed codes, in totals to left)  8-22 min = 1 unit; 23-37 min = 2 units; 38-52 min = 3 units; 53-67 min = 4 units; 68-82 min = 5 units   Total Total     [x]  Patient Education billed concurrently with other procedures   [x] Review HEP    [] Progressed/Changed HEP, detail:    [] Other detail:       Objective Information/Functional Measures/Assessment   Added TKE with BTB    Left knee ROM: -2-0 degs; 105 -108 degs    Patient presented to PT with no increase in left knee pain.  Added exercises, as per flow sheet, to assist with increasing left knee strength and ROM. Increased muscle fatigue was experienced with today's exercises. Decreased left knee swelling present today. Left knee ROM continues to improve with PT. Will continue to progress patient as tolerated. Patient will continue to benefit from skilled PT / OT services to modify and progress therapeutic interventions, analyze and address functional mobility deficits, analyze and address ROM deficits, analyze and address strength deficits, analyze and address soft tissue restrictions, analyze and cue for proper movement patterns, analyze and address imbalance/dizziness, and instruct in home and community integration to address functional deficits and attain remaining goals. Progress toward goals / Updated goals:  []  See Progress Note/Recertification  1. Patient to improve left knee flexion to 115 deg and extension to 0 deg for greater ease of sit to stand and ambulation. PN: Left knee ROM: -3-98 degrees. Current: Left knee PROM: 0-108 degrees. 3/3/2023     2. Patient will be able negotiate 6\" stairs with no HR and reciprocal pattern in order to improve left quad functional strength in the community and at home. PN: Not initiated due to increase swelling. Current: 6\" stairs x 2, (B) HR and reciprocal pattern. 3/3/2023     3. Patient will tolerate walking a mile with no increase in left knee pain in order to return patient to recreational activities before surgery. PN: Patient able to walk approx 100 yards with no AD or increase in left knee pain.        PLAN  Yes  Continue plan of care  []  Upgrade activities as tolerated  []  Discharge due to :  []  Other:    Natalio Dodd PTA    3/3/2023    9:06 AM    Future Appointments   Date Time Provider Jaimie Dunbar   3/3/2023  9:45 AM RO Frias BS AMB   3/3/2023 12:00 PM Natalio Dodd PTA [facility]   3/7/2023  2:00 PM Natalio Dodd PTA [facility]   3/9/2023 10:00 AM Natalio Dodd PTA [facility] [address]   3/14/2023 10:00 AM Laura Landing, PTA [facility] [address]   3/16/2023  8:30 AM Laura Landing, PTA [facility] [address]   3/16/2023  2:45 PM [facility] PAT ONE CLICK [facility]   9/74/5009  3:20 PM MD CRISTOPHER Gunter BS AMB   3/23/2023  7:00 AM MD CRISTOPHER Parker BS AMB   7/25/2023  8:05 AM IO LAB VISIT [facility] BS AMB   8/1/2023  8:00 AM Gifty Ortiz MD [facility] BS AMB

## 2023-03-03 NOTE — PROGRESS NOTES
Subjective:      Patient presents for postop care following left TKA. Surgery was on 1/27/2023. Ambulating  independently . Pain is controlled with current analgesics. Medication(s) being used: acetaminophen. .    Objective: There were no vitals taken for this visit. General:  alert, cooperative, no distress, appears stated age   ROM: -1/100 (per PT)   Incision:   no erythema, well-healed, mild swelling     Assessment:     Doing well postoperatively. Postoperative course complicated by delayed PT    Plan:     1. Continue PT. 2. May discontinue DVT prophylaxis; begin  mg TID prn.  3. Right TKA preop visit in 2 weeks. Phylicia Hunt, who was evaluated through a synchronous (real-time) audio-video encounter, and/or his healthcare decision maker, is aware that it is a billable service, with coverage as determined by his insurance carrier. He provided verbal consent to proceed: Yes, and patient identification was verified. It was conducted pursuant to the emergency declaration under the [address] [place] [address] authority and the [employer] Act. A caregiver was present when appropriate. Ability to conduct physical exam was limited. I was in the office. The patient was at home.

## 2023-03-06 DIAGNOSIS — Z96.651 STATUS POST TOTAL RIGHT KNEE REPLACEMENT: Primary | ICD-10-CM

## 2023-03-07 ENCOUNTER — HOSPITAL ENCOUNTER (OUTPATIENT)
Facility: HOSPITAL | Age: 63
Setting detail: RECURRING SERIES
Discharge: HOME | End: 2023-03-10
Payer: COMMERCIAL

## 2023-03-07 PROCEDURE — 97112 NEUROMUSCULAR REEDUCATION: CPT

## 2023-03-07 PROCEDURE — 97530 THERAPEUTIC ACTIVITIES: CPT

## 2023-03-07 PROCEDURE — 97110 THERAPEUTIC EXERCISES: CPT

## 2023-03-07 NOTE — PROGRESS NOTES
PHYSICAL / OCCUPATIONAL THERAPY - DAILY TREATMENT NOTE (updated )    Patient Name: Bridgette Mcgregor    Date: 3/7/2023    : 1960  Insurance: Payor: Lauren White / Plan: Prateek Stallings / Product Type: *No Product type* /      Patient  verified Yes     Visit #   Current / Total 3 10   Time   In / Out 151 pm  249   Pain   In / Out 2/10   0/10   Subjective Functional Status/Changes: \"I went to convention on Saturday and did a lot of walking. My knee was sore afterwards\" Patient reports that he has been working on the range of motion and he has had a hard time sleeping at night because of it. Changes to:  Meds, Allergies, Med Hx, Sx Hx? If yes, update Summary List no       TREATMENT AREA =  Pain in left knee [M25.562]    OBJECTIVE    Modalities Rationale:     decrease edema, decrease inflammation, decrease pain, and increase tissue extensibility to improve patient's ability to progress to PLOF and address remaining functional goals. min [] Estim Unattended, type/location:                                      []  w/ice    []  w/heat    min [] Estim Attended, type/location:                                     []  w/US     []  w/ice    []  w/heat    []  TENS insruct      min []  Mechanical Traction: type/lbs                   []  pro   []  sup   []  int   []  cont    []  before manual    []  after manual    min []  Ultrasound, settings/location:      min []  Iontophoresis w/ dexamethasone, location:                                               []  take home patch       []  in clinic   10 min  unbill [x]  Ice     [x]  Heat    location/position: Reclined; anterior CP and posterior knee MHP    min []  Paraffin,  details:     min []  Vasopneumatic Device, press/temp:     min []  Nicole Davis / Dawson Late:     If using vaso (only need to measure limb vaso being performed on)      pre-treatment girth :       post-treatment girth :       measured at (landmark location) :      min []  Other:    Skin assessment post-treatment (if applicable):    []  intact    []  redness- no adverse reaction                 []redness - adverse reaction:         Therapeutic Procedures: Tx Min Billable or 1:1 Min (if diff from Tx Min) Procedure, Rationale, Specifics   20 20 04840 Therapeutic Exercise (timed):  increase ROM, strength, coordination, balance, and proprioception to improve patient's ability to progress to PLOF and address remaining functional goals. (see flow sheet as applicable)     Details if applicable:       13 13 14406 Therapeutic Activity (timed):  use of dynamic activities replicating functional movements to increase ROM, strength, coordination, balance, and proprioception in order to improve patient's ability to progress to PLOF and address remaining functional goals. (see flow sheet as applicable)     Details if applicable:     15 15 12512 Neuromuscular Re-Education (timed):  improve balance, coordination, kinesthetic sense, posture, core stability and proprioception to improve patient's ability to develop conscious control of individual muscles and awareness of position of extremities in order to progress to PLOF and address remaining functional goals.  (see flow sheet as applicable)     Details if applicable:  DTM/STM to left hamstring and gastroc with patient in prone          Details if applicable:            Details if applicable:     50 48 [facility] Totals Reminder: bill using total billable min of TIMED therapeutic procedures (example: do not include dry needle or estim unattended, both untimed codes, in totals to left)  8-22 min = 1 unit; 23-37 min = 2 units; 38-52 min = 3 units; 53-67 min = 4 units; 68-82 min = 5 units   Total Total     [x]  Patient Education billed concurrently with other procedures   [x] Review HEP    [] Progressed/Changed HEP, detail:    [] Other detail:       Objective Information/Functional Measures/Assessment   Added S/L leg press     Progressed reps of 6\" stairs    Muscle spasms present at left hamstring and gastroc    Left knee ROM:Left knee AROM: 2-108 degrees; PROM 0-110 degs    Patient presented to PT with increased left knee stiffness. Mild antalgic gait noted today. Introduced exercises, as per flow sheet, to assist with increasing left knee strength. Patient able to negotiate stairs with no HHA today. Left knee ROM continue to improve with PT. Muscle spasms decreased following manual therapy. Decreased pain was reported at the end of today's session. Will continue to progress patient as tolerated. Patient will continue to benefit from skilled PT / OT services to modify and progress therapeutic interventions, analyze and address functional mobility deficits, analyze and address ROM deficits, analyze and address strength deficits, analyze and address soft tissue restrictions, analyze and cue for proper movement patterns, analyze and address imbalance/dizziness, and instruct in home and community integration to address functional deficits and attain remaining goals. Progress toward goals / Updated goals:  []  See Progress Note/Recertification  1. Patient to improve left knee flexion to 115 deg and extension to 0 deg for greater ease of sit to stand and ambulation. PN: Left knee ROM: -3-98 degrees. Current: Left knee AROM: 2-108 degrees; PROM 0-110 degs. 3/7/2023     2. Patient will be able negotiate 6\" stairs with no HR and reciprocal pattern in order to improve left quad functional strength in the community and at home. PN: Not initiated due to increase swelling. Current: 6\" stairs x 3 with reciprocal pattern. 3/7/2023     3. Patient will tolerate walking a mile with no increase in left knee pain in order to return patient to recreational activities before surgery. PN: Patient able to walk approx 100 yards with no AD or increase in left knee pain.        PLAN  Yes  Continue plan of care  []  Upgrade activities as tolerated  []  Discharge due to :  []  Other:    Rafael Taylor PTA    3/7/2023    1:39 PM    Future Appointments   Date Time Provider Jaimie Dunbar   3/7/2023  2:00 PM Laura Home, [place] [facility]   3/9/2023 10:00 AM Laura Home, PTA [facility]   3/14/2023 10:00 AM Laura Home, PTA [facility]   3/16/2023  8:30 AM Laura Home, PTA [facility]   3/16/2023  2:45 PM [facility] PAT ONE CLICK [facility]   2/84/0044  3:20 PM [facility]   3/23/2023  7:00 AM MD CRISTOPHER Marsh BS AMB   7/25/2023  8:05 AM [facility] LAB VISIT [facility] BS AMB   8/1/2023  8:00 AM Eugenie Merchant MD IO BS AMB

## 2023-03-09 ENCOUNTER — HOSPITAL ENCOUNTER (OUTPATIENT)
Facility: HOSPITAL | Age: 63
Setting detail: RECURRING SERIES
Discharge: HOME | End: 2023-03-12
Payer: COMMERCIAL

## 2023-03-09 PROCEDURE — 97112 NEUROMUSCULAR REEDUCATION: CPT

## 2023-03-09 PROCEDURE — 97110 THERAPEUTIC EXERCISES: CPT

## 2023-03-09 PROCEDURE — 97530 THERAPEUTIC ACTIVITIES: CPT

## 2023-03-09 NOTE — PROGRESS NOTES
PHYSICAL / OCCUPATIONAL THERAPY - DAILY TREATMENT NOTE (updated )    Patient Name: Fredderick Goodell    Date: 3/9/2023    : 1960  Insurance: Payor: Raman Chi / Plan: Chey Souza / Product Type: *No Product type* /      Patient  verified Yes     Visit #   Current / Total 4 10   Time   In / Out 1000 am   1049 am    Pain   In / Out 0/10  0/10    Subjective Functional Status/Changes: Patient reports that his knee feels really good today. Patient denies any pain after last visit. Changes to:  Meds, Allergies, Med Hx, Sx Hx? If yes, update Summary List no       TREATMENT AREA =  Pain in left knee [M25.562]    OBJECTIVE    Modalities Rationale:     decrease edema, decrease inflammation, decrease pain, and increase tissue extensibility to improve patient's ability to progress to PLOF and address remaining functional goals. min [] Estim Unattended, type/location:                                      []  w/ice    []  w/heat    min [] Estim Attended, type/location:                                     []  w/US     []  w/ice    []  w/heat    []  TENS insruct      min []  Mechanical Traction: type/lbs                   []  pro   []  sup   []  int   []  cont    []  before manual    []  after manual    min []  Ultrasound, settings/location:      min []  Iontophoresis w/ dexamethasone, location:                                               []  take home patch       []  in clinic   10 min  unbill [x]  Ice     [x]  Heat    location/position: Reclined; anterior CP and posterior knee MHP    min []  Paraffin,  details:     min []  Vasopneumatic Device, press/temp:     min []  Simon Red / Giuliana Roch:     If using vaso (only need to measure limb vaso being performed on)      pre-treatment girth :       post-treatment girth :       measured at (landmark location) :      min []  Other:    Skin assessment post-treatment (if applicable):    []  intact    []  redness- no adverse reaction                 []redness - adverse reaction:         Therapeutic Procedures: Tx Min Billable or 1:1 Min (if diff from Tx Min) Procedure, Rationale, Specifics   19 19 [id number] Therapeutic Exercise (timed):  increase ROM, strength, coordination, balance, and proprioception to improve patient's ability to progress to PLOF and address remaining functional goals. (see flow sheet as applicable)     Details if applicable:       10 10 33104 Therapeutic Activity (timed):  use of dynamic activities replicating functional movements to increase ROM, strength, coordination, balance, and proprioception in order to improve patient's ability to progress to PLOF and address remaining functional goals. (see flow sheet as applicable)     Details if applicable:     10 10 48592 Neuromuscular Re-Education (timed):  improve balance, coordination, kinesthetic sense, posture, core stability and proprioception to improve patient's ability to develop conscious control of individual muscles and awareness of position of extremities in order to progress to PLOF and address remaining functional goals.  (see flow sheet as applicable)     Details if applicable:  DTM/STM to left hamstring and gastroc with patient in prone          Details if applicable:            Details if applicable:     44 39 [facility] Totals Reminder: bill using total billable min of TIMED therapeutic procedures (example: do not include dry needle or estim unattended, both untimed codes, in totals to left)  8-22 min = 1 unit; 23-37 min = 2 units; 38-52 min = 3 units; 53-67 min = 4 units; 68-82 min = 5 units   Total Total     [x]  Patient Education billed concurrently with other procedures   [x] Review HEP    [] Progressed/Changed HEP, detail:    [] Other detail:       Objective Information/Functional Measures/Assessment   Added SLS     Progressed weight of SL and DL leg press    Muscle spasms present at left hamstring and gastroc    Left knee ROM:Left knee AROM: 2-110 degs; PROM 0-113 degs    Patient presented to PT with no increase in left knee pain. Progressed and added exercises, as per flow sheet, to further assist with increasing left knee ROM. Patient tolerated today's progressed exercises well with no increase in pain. Left knee ROM continues to improve with PT. No increase in pain was reported at the end of today's session. Will continue to progress patient as tolerated. Patient will continue to benefit from skilled PT / OT services to modify and progress therapeutic interventions, analyze and address functional mobility deficits, analyze and address ROM deficits, analyze and address strength deficits, analyze and address soft tissue restrictions, analyze and cue for proper movement patterns, analyze and address imbalance/dizziness, and instruct in home and community integration to address functional deficits and attain remaining goals. Progress toward goals / Updated goals:  []  See Progress Note/Recertification  1. Patient to improve left knee flexion to 115 deg and extension to 0 deg for greater ease of sit to stand and ambulation. PN: Left knee ROM: -3-98 degrees. Current: Left knee AROM: Left knee AROM: 2-110 degs; PROM 0-113 degs. 3/9/2023     2. Patient will be able negotiate 6\" stairs with no HR and reciprocal pattern in order to improve left quad functional strength in the community and at home. PN: Not initiated due to increase swelling. Current: 6\" stairs x 3 with reciprocal pattern. 3/9/2023     3. Patient will tolerate walking a mile with no increase in left knee pain in order to return patient to recreational activities before surgery. PN: Patient able to walk approx 100 yards with no AD or increase in left knee pain.        PLAN  Yes  Continue plan of care  []  Upgrade activities as tolerated  []  Discharge due to :  []  Other:    Cat Nicholas PTA    3/9/2023    7:55 AM    Future Appointments   Date Time Provider Jaimie Dunbar   3/9/2023 10:00 AM Cat Nicholas PTA [facility]   3/14/2023 10:00 AM Amanda Castellon PTA [facility]   3/16/2023  8:30 AM Amanda Castellon PTA [facility]   3/16/2023  2:45 PM [facility]   7/36/8161  3:20 PM Cecil Agustin MD [facility] AM[facility]   3/23/2023  7:00 AM [facility] BS AMB   7/25/2023  8:05 AM [facility] LAB VISIT [facility] BS AMB   8/1/2023  8:00 AM Jessy Hanks MD [facility] BS AMB

## 2023-03-14 ENCOUNTER — HOSPITAL ENCOUNTER (OUTPATIENT)
Facility: HOSPITAL | Age: 63
Setting detail: RECURRING SERIES
Discharge: HOME | End: 2023-03-17
Payer: COMMERCIAL

## 2023-03-14 PROCEDURE — 97110 THERAPEUTIC EXERCISES: CPT

## 2023-03-14 PROCEDURE — 97112 NEUROMUSCULAR REEDUCATION: CPT

## 2023-03-14 PROCEDURE — 97530 THERAPEUTIC ACTIVITIES: CPT

## 2023-03-14 NOTE — PROGRESS NOTES
PHYSICAL / OCCUPATIONAL THERAPY - DAILY TREATMENT NOTE (updated )    Patient Name: Rafa Winkler    Date: 3/14/2023    : 1960  Insurance: Payor: Mitch Route / Plan: Shahida Montana / Product Type: *No Product type* /      Patient  verified Yes     Visit #   Current / Total 5 10   Time   In / Out 1000 am  1051 am   Pain   In / Out 0/10  0/10    Subjective Functional Status/Changes: Patient report some soreness and stiffness in his leg after last visit and this morning. Changes to:  Meds, Allergies, Med Hx, Sx Hx? If yes, update Summary List no       TREATMENT AREA =  Pain in left knee [M25.562]    OBJECTIVE    Modalities Rationale:     decrease edema, decrease inflammation, decrease pain, and increase tissue extensibility to improve patient's ability to progress to PLOF and address remaining functional goals. min [] Estim Unattended, type/location:                                      []  w/ice    []  w/heat    min [] Estim Attended, type/location:                                     []  w/US     []  w/ice    []  w/heat    []  TENS insruct      min []  Mechanical Traction: type/lbs                   []  pro   []  sup   []  int   []  cont    []  before manual    []  after manual    min []  Ultrasound, settings/location:      min []  Iontophoresis w/ dexamethasone, location:                                               []  take home patch       []  in clinic   10 min  unbill [x]  Ice     []  Heat    location/position: Reclined; anterior and posterior left knee    min []  Paraffin,  details:     min []  Vasopneumatic Device, press/temp:     min []  Kirby Gill / Aki Agrawal:     If using vaso (only need to measure limb vaso being performed on)      pre-treatment girth :       post-treatment girth :       measured at (landmark location) :      min []  Other:    Skin assessment post-treatment (if applicable):    []  intact    []  redness- no adverse reaction                 []redness - adverse reaction:         Therapeutic Procedures: Tx Min Billable or 1:1 Min (if diff from Tx Min) Procedure, Rationale, Specifics   15 15 04045 Therapeutic Exercise (timed):  increase ROM, strength, coordination, balance, and proprioception to improve patient's ability to progress to PLOF and address remaining functional goals. (see flow sheet as applicable)     Details if applicable:       15 15 69782 Therapeutic Activity (timed):  use of dynamic activities replicating functional movements to increase ROM, strength, coordination, balance, and proprioception in order to improve patient's ability to progress to PLOF and address remaining functional goals. (see flow sheet as applicable)     Details if applicable:     11 11 53866 Neuromuscular Re-Education (timed):  improve balance, coordination, kinesthetic sense, posture, core stability and proprioception to improve patient's ability to develop conscious control of individual muscles and awareness of position of extremities in order to progress to PLOF and address remaining functional goals.  (see flow sheet as applicable)     Details if applicable:  DTM/STM to left hamstring and gastroc with patient in prone          Details if applicable:            Details if applicable:     39 41 [facility] Totals Reminder: bill using total billable min of TIMED therapeutic procedures (example: do not include dry needle or estim unattended, both untimed codes, in totals to left)  8-22 min = 1 unit; 23-37 min = 2 units; 38-52 min = 3 units; 53-67 min = 4 units; 68-82 min = 5 units   Total Total     [x]  Patient Education billed concurrently with other procedures   [x] Review HEP    [] Progressed/Changed HEP, detail:    [] Other detail:       Objective Information/Functional Measures/Assessment     Muscle spasms present at left hamstring and gastroc    Left knee ROM:Left knee AROM: 2-110 degs; PROM 0-112 degs    Patient presented to PT with no increase in left knee pain and increased stiffness. Patient performed exercises, as per flow sheet, to assist with left knee strength. Patient's left knee flexion decreased compared to last visit. No increase in pain was reported at the end of today's session. Patient's last authorized PT visit is next visit and then patient is scheduled for a TKR on his right knee. Patient will continue to benefit from skilled PT / OT services to modify and progress therapeutic interventions, analyze and address functional mobility deficits, analyze and address ROM deficits, analyze and address strength deficits, analyze and address soft tissue restrictions, analyze and cue for proper movement patterns, analyze and address imbalance/dizziness, and instruct in home and community integration to address functional deficits and attain remaining goals. Progress toward goals / Updated goals:  []  See Progress Note/Recertification  1. Patient to improve left knee flexion to 115 deg and extension to 0 deg for greater ease of sit to stand and ambulation. PN: Left knee ROM: -3-98 degrees. Current: Left knee AROM: Left knee AROM: 2-110 degs; PROM 0-112 degs. 3/14/2023     2. Patient will be able negotiate 6\" stairs with no HR and reciprocal pattern in order to improve left quad functional strength in the community and at home. PN: Not initiated due to increase swelling. Current: 6\" stairs x 3 with reciprocal pattern. 3/14/2023     3. Patient will tolerate walking a mile with no increase in left knee pain in order to return patient to recreational activities before surgery. PN: Patient able to walk approx 100 yards with no AD or increase in left knee pain.        PLAN  Yes  Continue plan of care  []  Upgrade activities as tolerated  []  Discharge due to :  []  Other:    Cat Nicholas PTA    3/14/2023    7:53 AM    Future Appointments   Date Time Provider Jaimie Dunbar   3/14/2023 10:00 AM Cat Nicholas PTA [facility]   3/16/2023  8:30 AM Cat Nicholas PTA [facility]   3/16/2023  2:45 PM [facility] PAT ONE CLICK [facility]   1/13/4486  3:20 PM Vickie Mena MD [facility] BS AMB   3/23/2023  7:00 AM Shravan Hernadez MD [facility] BS AMB   3/24/2023 10:30 AM Kirsten Martinez, PT [facility]   7/25/2023  8:05 AM [facility] LAB VISIT [facility] BS AMB   8/1/2023  8:00 AM Xiomara Solomon MD [facility] BS AMB

## 2023-03-16 ENCOUNTER — OFFICE VISIT (OUTPATIENT)
Age: 63
End: 2023-03-16

## 2023-03-16 ENCOUNTER — HOSPITAL ENCOUNTER (OUTPATIENT)
Facility: HOSPITAL | Age: 63
Setting detail: RECURRING SERIES
Discharge: HOME | End: 2023-03-19
Payer: COMMERCIAL

## 2023-03-16 ENCOUNTER — HOSPITAL ENCOUNTER (OUTPATIENT)
Age: 63
Discharge: HOME | End: 2023-03-19

## 2023-03-16 VITALS — HEIGHT: 72 IN | WEIGHT: 240 LBS | BODY MASS INDEX: 32.51 KG/M2

## 2023-03-16 DIAGNOSIS — G89.29 CHRONIC PAIN OF RIGHT KNEE: Primary | ICD-10-CM

## 2023-03-16 DIAGNOSIS — M17.11 PRIMARY OSTEOARTHRITIS OF RIGHT KNEE: ICD-10-CM

## 2023-03-16 DIAGNOSIS — M25.561 CHRONIC PAIN OF RIGHT KNEE: Primary | ICD-10-CM

## 2023-03-16 PROCEDURE — 97112 NEUROMUSCULAR REEDUCATION: CPT

## 2023-03-16 PROCEDURE — 97110 THERAPEUTIC EXERCISES: CPT

## 2023-03-16 PROCEDURE — 97530 THERAPEUTIC ACTIVITIES: CPT

## 2023-03-16 RX ORDER — CEPHALEXIN 500 MG/1
500 CAPSULE ORAL 4 TIMES DAILY
Qty: 40 CAPSULE | Refills: 0 | Status: SHIPPED | OUTPATIENT
Start: 2023-03-16 | End: 2023-03-26

## 2023-03-16 RX ORDER — HYDROMORPHONE HYDROCHLORIDE 4 MG/1
4 TABLET ORAL EVERY 6 HOURS PRN
Qty: 30 TABLET | Refills: 0 | Status: SHIPPED | OUTPATIENT
Start: 2023-03-16 | End: 2023-03-24

## 2023-03-16 NOTE — PROGRESS NOTES
Name: Lashawn Handy    : 1960     [address] [employer] [address] [place] [address]  Dept: [phone]  Dept Fax: [phone]     Chief Complaint   Patient presents with    Pre-op Exam    Knee Pain        Ht 6' (1.829 m)   Wt 240 lb (108.9 kg)   BMI 32.55 kg/m²      Allergies   Allergen Reactions    Atorvastatin Myalgia    Duloxetine Other (See Comments)     Gi upset    Erythromycin Diarrhea    Escitalopram Oxalate Other (See Comments)     Gi upset    Pravastatin Myalgia    Simvastatin Myalgia    Codeine Other (See Comments) and Nausea And Vomiting     Other reaction(s): other/intolerance      Penicillins Itching and Rash     Rash around collar area      Sulfa Antibiotics Rash        Current Outpatient Medications   Medication Sig Dispense Refill    diazePAM (VALIUM) 10 MG tablet TAKE 1 TABLET BY MOUTH EVERY 12 HOURS AS NEEDED FOR ANXIETY . DO NOT EXCEED 2 PER 24 HOURS 30 tablet 0    HYDROmorphone (DILAUDID) 2 MG tablet TAKE 1 TABLET BY MOUTH EVERY 6 HOURS AS NEEDED FOR PAIN FOR UP TO 8 DAYS. MAX DAILY AMOUNT: 8 MG. THIS RX IS TO ONLY BE USED AFTER SURGICAL PROCEDURE      gabapentin (NEURONTIN) 300 MG capsule Take 1 capsule by mouth nightly as needed (take 1 tablet at bedtime for pain) for up to 30 days. Max Daily Amount: 300 mg 30 capsule 0    acetaminophen (TYLENOL) 500 MG tablet Take 500 mg by mouth every 4 hours as needed      aspirin 81 MG EC tablet Take 81 mg by mouth daily      bisacodyl (DULCOLAX) 10 MG suppository Place 10 mg rectally daily as needed      diazePAM (VALIUM) 10 MG tablet TAKE 1 TABLET BY MOUTH EVERY 12 HOURS AS NEEDED FOR ANXIETY .  DO NOT EXCEED 2 PER 24 HOURS      fluticasone (FLONASE) 50 MCG/ACT nasal spray Use 2 spray(s) in each nostril once daily      levothyroxine (SYNTHROID) 100 MCG tablet TAKE 1 TABLET BY MOUTH ONCE DAILY BEFORE BREAKFAST      lisinopril (PRINIVIL;ZESTRIL) 2.5 MG tablet TAKE 1 TABLET BY MOUTH ONCE DAILY FOR HIGH BLOOD PRESSURE      naloxone 4 MG/0.1ML LIQD nasal spray Give 1 spray into 1 nostril. Give additional doses using a new nasal spray with each dose, if patient does not respond or relapses into respiratory depression. Call 911. Additional doses may be given every 2 to 3 minutes until medical assistance arrives. ondansetron (ZOFRAN-ODT) 4 MG disintegrating tablet Take 4 mg by mouth every 8 hours as needed      rosuvastatin (CRESTOR) 20 MG tablet Take 20 mg by mouth      diazePAM (VALIUM) 5 MG tablet Take 5 mg by mouth every 6 hours as needed for Anxiety. No current facility-administered medications for this visit. Patient Active Problem List   Diagnosis    Hypothyroidism due to acquired atrophy of thyroid    Dyslipidemia    Hypovitaminosis D    Colon polyps    IFG (impaired fasting glucose)    Neuropathy    Hypogonadism male    Anxiety    SHWETA (obstructive sleep apnea)    Gastroesophageal reflux disease without esophagitis    Primary osteoarthritis involving multiple joints    Obesity (BMI 30.0-34. 9)    CVA (cerebral vascular accident) ([facility] [address]    Essential hypertension    Complex tear of medial meniscus of left knee as current injury    OA (osteoarthritis) of knee      Family History   Problem Relation Age of Onset    Hypertension Father     Heart Disease Father     Rheum Arthritis Mother     Cancer Mother         renal and kisney cancer    Hypertension Mother       Social History     Socioeconomic History    Marital status:      Spouse name: None    Number of children: None    Years of education: None    Highest education level: None   Tobacco Use    Smoking status: Never    Smokeless tobacco: Never   Substance and Sexual Activity    Alcohol use: No    Drug use: No      Past Surgical History:   Procedure Laterality Date    COLONOSCOPY      Dr. Jose Garcia 2008 polyp; Dr Lynne Steve 2/3/12 neg; Dr Wild Cruz (11/22) neg    HEENT  08/2022    US thyroid neg    HERNIA REPAIR      JOINT REPLACEMENT Left 01/27/2023    lt tka    KNEE ARTHROSCOPY Left 3-4 yrs ago    Gisselle Gonzalez 36        Past Medical History:   Diagnosis Date    Allergic rhinitis, seasonal     Anxiety     JUDD-7 was 11/21 from 7/11    Colon polyps 2008    Dr. Williams Miller    CVA (cerebral vascular accident) [facility]) 2006    Dr. Hal Allen s/p PFO closure, carotids negative    Degenerative arthritis of cervical spine 2013    on mri    Depression     PHQ-9 was 7/27 from (7/11); 10/27 from (5/15); lexapro intol; effexor briefly 2020    Diverticulosis     Dyslipidemia     intol lipitor, prava, zocor; tolerated mevacor    FHx: heart disease     GERD (gastroesophageal reflux disease)     egd 10/22 Dr Jb Carvalho mild reflux changes, neg dysplasia or eoe    H/O cardiovascular stress test     ETT neg (5/16)    H/O echocardiogram     (1/18) nl lv, ef 60%, mild dd, mild/mod LVH, tr AI, aortic root 4cm, well seated pfo closure device Dr Ofelia Duarte    Hearing loss 2016    right sided; s/p myringotomy Dr Hoa Stokes; now seeing diff ENT    Hemorrhoid     Hypertension 03/22/2018    component of white coat    Hypogonadism male 2011    Dr Chanelle Brown; dc'ed replacement tx 2015    Hypothyroidism     IFG (impaired fasting glucose) 2011    Nephrolithiasis     Obesity (BMI 30-39.9)     peak weight 245 lbs, bmi 34.7 from 5/15; dec neida supp, med sup wt loss, bariatrics; IF 6/18 start weight 251 but did not do; did keto/low carb 6/18    SHWETA (obstructive sleep apnea) 2006    Dr. Mando Joseph    Osteoarthritis of both knees 2017    Peripheral neuropathy 02/2013    on EMG negative serologies    PFO (patent foramen ovale)         I have reviewed and agree with PFSH and ROS and intake form in chart and the record furthermore I have reviewed prior medical record(s) regarding this patients care during this appointment.      Review of Systems:   Patient is a pleasant appearing individual, appropriately dressed, well hydrated, well nourished, who is alert, appropriately oriented for age, and in no acute distress with a normal gait and normal affect who does not appear to be in any significant pain. Physical Exam:  Right Knee -Decrease range of motion with flexion, Knee arc of greater than 50 degrees, Some crepitation, Grossly neurovascularly intact, Good cap refill, No skin lesion, Moderate swelling, some gross instability, Some quadriceps weakness, Kellgren and Villa at least grade 3    Left Knee - Full Range of Motion, No crepitation, Grossly neurovascularly intact, Good cap refill, No skin lesion, No swelling, No gross instability, No quadriceps weakness     Encounter Diagnoses   Name Primary? Chronic pain of right knee Yes    Primary osteoarthritis of right knee      Inpatient status: The patient has admitted to severe pain in the affected knee and due to such pain they are unable to complete activities of daily living at home and/or work on a regular basis where conservative treatments have failed. After extensive discussion with the patient, they have chosen to receive a total knee replacement with the expectation of inpatient procedure. Their dependent functional status (i.e. lack of capable support and safety at home, pain management, comorbities, or difficulty ambulating with assistive walking devices) would deem them a candidate for an inpatient stay. The patient acknowledges and understand the plan. The risks of surgery were explained to the patient which include but not limited to infection, nerve injury, artery injury, tendon injury, poor result, poor wound healing, unforeseen incidence, bleeding, infection, nerve damage, failure to improve, worsening of symptoms, morbidity, and mortality risks were explained. All questions were answered. Patient was told of no guarantees. Patient accepts all risks and benefits. A consent for surgery will be documented and signed by the patient or a legal guardian. All questions were answered.  The procedure was explained in detail. The patient was counseled about the risks of raisa Covid-19 during their perioperative period and any recovery window from their procedure. The patient was made aware that raisa Covid-19 may worsen their prognosis for recovering from their procedure and lend to a higher morbidity and/or mortality risk. All material risks, benefits, and reasonable alternatives including postponing the procedure were discussed. The patient DOES wish to proceed with their procedure at this time. HPI:  The patient is here with a chief complaint of right knee pain, throbbing, burning pain. Diagnosed with osteoarthritis. Preop appointment. Assessment/Plan:  Plan will be for right total knee replacement. We will not use tourniquet since he has got a history of blood clots, especially on the right side. He has got a little bit of serosanguinous drainage on his left knee. So he will use Keflex for 10 days, Dilaudid, and aspirin. He has Zofran. Possible semi-constrained. No tourniquet. As part of continued conservative pain management options the patient was advised to utilize Tylenol or OTC NSAIDS as long as it is not medically contraindicated. Return to Office: Follow-up and Dispositions    Return for ALREADY SCHEDULED FOR SURGERY. Scribed by Melodie Nissen, MA as dictated by [facility] [place] RODNEY De Leon MD.  Documentation, performed by, True and Accepted Shravan De Leon MD

## 2023-03-16 NOTE — H&P (VIEW-ONLY)
Name: Gabe Wallace    : 1960     [facility] [address] [place] [address] AND SPORTS [address] [place] [address]  Dept: [phone]  Dept Fax: [phone]     Chief Complaint   Patient presents with    Pre-op Exam    Knee Pain        Ht 6' (1.829 m)   Wt 240 lb (108.9 kg)   BMI 32.55 kg/m²      Allergies   Allergen Reactions    Atorvastatin Myalgia    Duloxetine Other (See Comments)     Gi upset    Erythromycin Diarrhea    Escitalopram Oxalate Other (See Comments)     Gi upset    Pravastatin Myalgia    Simvastatin Myalgia    Codeine Other (See Comments) and Nausea And Vomiting     Other reaction(s): other/intolerance      Penicillins Itching and Rash     Rash around collar area      Sulfa Antibiotics Rash        Current Outpatient Medications   Medication Sig Dispense Refill    diazePAM (VALIUM) 10 MG tablet TAKE 1 TABLET BY MOUTH EVERY 12 HOURS AS NEEDED FOR ANXIETY . DO NOT EXCEED 2 PER 24 HOURS 30 tablet 0    HYDROmorphone (DILAUDID) 2 MG tablet TAKE 1 TABLET BY MOUTH EVERY 6 HOURS AS NEEDED FOR PAIN FOR UP TO 8 DAYS. MAX DAILY AMOUNT: 8 MG. THIS RX IS TO ONLY BE USED AFTER SURGICAL PROCEDURE      gabapentin (NEURONTIN) 300 MG capsule Take 1 capsule by mouth nightly as needed (take 1 tablet at bedtime for pain) for up to 30 days. Max Daily Amount: 300 mg 30 capsule 0    acetaminophen (TYLENOL) 500 MG tablet Take 500 mg by mouth every 4 hours as needed      aspirin 81 MG EC tablet Take 81 mg by mouth daily      bisacodyl (DULCOLAX) 10 MG suppository Place 10 mg rectally daily as needed      diazePAM (VALIUM) 10 MG tablet TAKE 1 TABLET BY MOUTH EVERY 12 HOURS AS NEEDED FOR ANXIETY .  DO NOT EXCEED 2 PER 24 HOURS      fluticasone (FLONASE) 50 MCG/ACT nasal spray Use 2 spray(s) in each nostril once daily      levothyroxine (SYNTHROID) 100 MCG tablet TAKE 1 TABLET BY MOUTH ONCE DAILY BEFORE BREAKFAST      lisinopril (PRINIVIL;ZESTRIL) 2.5 MG appropriately oriented for age, and in no acute distress with a normal gait and normal affect who does not appear to be in any significant pain. Physical Exam:  Right Knee -Decrease range of motion with flexion, Knee arc of greater than 50 degrees, Some crepitation, Grossly neurovascularly intact, Good cap refill, No skin lesion, Moderate swelling, some gross instability, Some quadriceps weakness, Kellgren and Villa at least grade 3    Left Knee - Full Range of Motion, No crepitation, Grossly neurovascularly intact, Good cap refill, No skin lesion, No swelling, No gross instability, No quadriceps weakness     Encounter Diagnoses   Name Primary? Chronic pain of right knee Yes    Primary osteoarthritis of right knee      Inpatient status: The patient has admitted to severe pain in the affected knee and due to such pain they are unable to complete activities of daily living at home and/or work on a regular basis where conservative treatments have failed. After extensive discussion with the patient, they have chosen to receive a total knee replacement with the expectation of inpatient procedure. Their dependent functional status (i.e. lack of capable support and safety at home, pain management, comorbities, or difficulty ambulating with assistive walking devices) would deem them a candidate for an inpatient stay. The patient acknowledges and understand the plan. The risks of surgery were explained to the patient which include but not limited to infection, nerve injury, artery injury, tendon injury, poor result, poor wound healing, unforeseen incidence, bleeding, infection, nerve damage, failure to improve, worsening of symptoms, morbidity, and mortality risks were explained. All questions were answered. Patient was told of no guarantees. Patient accepts all risks and benefits. A consent for surgery will be documented and signed by the patient or a legal guardian. All questions were answered.  The procedure

## 2023-03-16 NOTE — PROGRESS NOTES
min = 1 unit; 23-37 min = 2 units; 38-52 min = 3 units; 53-67 min = 4 units; 68-82 min = 5 units   Total Total        [x]  Patient Education billed concurrently with other procedures   [x] Review HEP    [] Progressed/Changed HEP, detail:    [] Other detail:       Objective Information/Functional Measures/Assessment    FOTO assessment score: 66 points     Left knee ROM:0-114 degs    Patient continues to present to PT with no increase in left knee pain. Progressed exercises, as per flow sheet, to further assist with increasing left knee strength and stability. Patient tolerated all exercises well with no increase in left knee pain. Swelling continues to improve and surgical site is healing well. Today is patient's last authorized PT appointment. Patient is scheduled to have a TKR on his right knee next week. Will continue as suggested by MD after surgery. GOALS  1. Patient to improve left knee flexion to 115 deg and extension to 0 deg for greater ease of sit to stand and ambulation. PN: Left knee ROM: -3-98 degrees. Current: Left knee ROM: 0-114 degs. Progressing     2. Patient will be able negotiate 6\" stairs with no HR and reciprocal pattern in order to improve left quad functional strength in the community and at home. PN: Not initiated due to increase swelling. Current: 6\" stairs x 3 with reciprocal pattern, no HR and no increase in left knee pain. MET      3. Patient will tolerate walking a mile with no increase in left knee pain in order to return patient to recreational activities before surgery. PN: Patient able to walk approx 100 yards with no AD or increase in left knee pain. Current:Patient reports being able to tolerate a half of a mile. Progressing    RECOMMENDATIONS  Patient to be discharged today due to being scheduled for a TKR on his right knee next week.      Ruddy Whitehead, PTA       3/16/2023       1:38 PM    Thank you for this referral!

## 2023-03-20 DIAGNOSIS — M17.11 PRIMARY OSTEOARTHRITIS OF RIGHT KNEE: Primary | ICD-10-CM

## 2023-03-20 RX ORDER — HYDROMORPHONE HYDROCHLORIDE 2 MG/1
4 TABLET ORAL EVERY 6 HOURS PRN
Qty: 30 TABLET | Refills: 0 | Status: SHIPPED | OUTPATIENT
Start: 2023-03-20 | End: 2023-03-28

## 2023-03-23 ENCOUNTER — APPOINTMENT (OUTPATIENT)
Age: 63
End: 2023-03-23
Attending: ORTHOPAEDIC SURGERY
Payer: COMMERCIAL

## 2023-03-23 ENCOUNTER — HOSPITAL ENCOUNTER (OUTPATIENT)
Age: 63
Setting detail: OBSERVATION
Discharge: HOME | End: 2023-03-24
Attending: ORTHOPAEDIC SURGERY | Admitting: INTERNAL MEDICINE
Payer: COMMERCIAL

## 2023-03-23 ENCOUNTER — ANESTHESIA (OUTPATIENT)
Age: 63
End: 2023-03-23
Payer: COMMERCIAL

## 2023-03-23 PROBLEM — Z96.651 S/P TKR (TOTAL KNEE REPLACEMENT), RIGHT: Status: ACTIVE | Noted: 2023-03-23

## 2023-03-23 PROCEDURE — 7100000001 HC PACU RECOVERY - ADDTL 15 MIN: Performed by: ORTHOPAEDIC SURGERY

## 2023-03-23 PROCEDURE — 2580000003 HC RX 258: Performed by: ORTHOPAEDIC SURGERY

## 2023-03-23 PROCEDURE — 6370000000 HC RX 637 (ALT 250 FOR IP): Performed by: NURSE PRACTITIONER

## 2023-03-23 PROCEDURE — 96375 TX/PRO/DX INJ NEW DRUG ADDON: CPT

## 2023-03-23 PROCEDURE — 64447 NJX AA&/STRD FEMORAL NRV IMG: CPT | Performed by: NURSE ANESTHETIST, CERTIFIED REGISTERED

## 2023-03-23 PROCEDURE — 2500000003 HC RX 250 WO HCPCS: Performed by: NURSE ANESTHETIST, CERTIFIED REGISTERED

## 2023-03-23 PROCEDURE — 6360000002 HC RX W HCPCS: Performed by: NURSE PRACTITIONER

## 2023-03-23 PROCEDURE — 2580000003 HC RX 258: Performed by: NURSE ANESTHETIST, CERTIFIED REGISTERED

## 2023-03-23 PROCEDURE — 6370000000 HC RX 637 (ALT 250 FOR IP): Performed by: NURSE ANESTHETIST, CERTIFIED REGISTERED

## 2023-03-23 PROCEDURE — 51798 US URINE CAPACITY MEASURE: CPT

## 2023-03-23 PROCEDURE — G0378 HOSPITAL OBSERVATION PER HR: HCPCS

## 2023-03-23 PROCEDURE — 3700000000 HC ANESTHESIA ATTENDED CARE: Performed by: ORTHOPAEDIC SURGERY

## 2023-03-23 PROCEDURE — 6360000002 HC RX W HCPCS: Performed by: NURSE ANESTHETIST, CERTIFIED REGISTERED

## 2023-03-23 PROCEDURE — 2720000010 HC SURG SUPPLY STERILE: Performed by: ORTHOPAEDIC SURGERY

## 2023-03-23 PROCEDURE — 3700000001 HC ADD 15 MINUTES (ANESTHESIA): Performed by: ORTHOPAEDIC SURGERY

## 2023-03-23 PROCEDURE — 96365 THER/PROPH/DIAG IV INF INIT: CPT

## 2023-03-23 PROCEDURE — C1713 ANCHOR/SCREW BN/BN,TIS/BN: HCPCS | Performed by: ORTHOPAEDIC SURGERY

## 2023-03-23 PROCEDURE — 7100000011 HC PHASE II RECOVERY - ADDTL 15 MIN: Performed by: ORTHOPAEDIC SURGERY

## 2023-03-23 PROCEDURE — 97162 PT EVAL MOD COMPLEX 30 MIN: CPT

## 2023-03-23 PROCEDURE — 96376 TX/PRO/DX INJ SAME DRUG ADON: CPT

## 2023-03-23 PROCEDURE — 73560 X-RAY EXAM OF KNEE 1 OR 2: CPT

## 2023-03-23 PROCEDURE — C1776 JOINT DEVICE (IMPLANTABLE): HCPCS | Performed by: ORTHOPAEDIC SURGERY

## 2023-03-23 PROCEDURE — 3600000005 HC SURGERY LEVEL 5 BASE: Performed by: ORTHOPAEDIC SURGERY

## 2023-03-23 PROCEDURE — 6360000002 HC RX W HCPCS

## 2023-03-23 PROCEDURE — 7100000010 HC PHASE II RECOVERY - FIRST 15 MIN: Performed by: ORTHOPAEDIC SURGERY

## 2023-03-23 PROCEDURE — 2709999900 HC NON-CHARGEABLE SUPPLY: Performed by: ORTHOPAEDIC SURGERY

## 2023-03-23 PROCEDURE — 3600000015 HC SURGERY LEVEL 5 ADDTL 15MIN: Performed by: ORTHOPAEDIC SURGERY

## 2023-03-23 PROCEDURE — 2580000003 HC RX 258: Performed by: NURSE PRACTITIONER

## 2023-03-23 PROCEDURE — 2500000003 HC RX 250 WO HCPCS: Performed by: ORTHOPAEDIC SURGERY

## 2023-03-23 PROCEDURE — 7100000000 HC PACU RECOVERY - FIRST 15 MIN: Performed by: ORTHOPAEDIC SURGERY

## 2023-03-23 PROCEDURE — 2500000003 HC RX 250 WO HCPCS: Performed by: NURSE PRACTITIONER

## 2023-03-23 DEVICE — ATTUNE PATELLA MEDIALIZED DOME 41MM CEMENTED AOX
Type: IMPLANTABLE DEVICE | Site: KNEE | Status: FUNCTIONAL
Brand: ATTUNE

## 2023-03-23 DEVICE — CEMENT BONE 40 GM W/ GENTMYCN HI VISC PALACOS R+G: Type: IMPLANTABLE DEVICE | Site: KNEE | Status: FUNCTIONAL

## 2023-03-23 DEVICE — KNEE K1 TOT HEMI STD CEM IMPL CAPPED SYNTHES: Type: IMPLANTABLE DEVICE | Site: KNEE | Status: FUNCTIONAL

## 2023-03-23 DEVICE — ATTUNE KNEE SYSTEM FEMORAL POSTERIOR STABILIZED SIZE 7 RIGHT CEMENTED
Type: IMPLANTABLE DEVICE | Site: KNEE | Status: FUNCTIONAL
Brand: ATTUNE

## 2023-03-23 DEVICE — ATTUNE KNEE SYSTEM TIBIAL BASE ROTATING PLATFORM SIZE 7 CEMENTED
Type: IMPLANTABLE DEVICE | Site: KNEE | Status: FUNCTIONAL
Brand: ATTUNE

## 2023-03-23 DEVICE — ATTUNE KNEE SYSTEM TIBIAL INSERT ROTATING PLATFORM POSTERIOR STABILIZED 7 5MM AOX
Type: IMPLANTABLE DEVICE | Site: KNEE | Status: FUNCTIONAL
Brand: ATTUNE

## 2023-03-23 RX ORDER — ACETAMINOPHEN 500 MG
1000 TABLET ORAL ONCE
Status: COMPLETED | OUTPATIENT
Start: 2023-03-23 | End: 2023-03-23

## 2023-03-23 RX ORDER — ACETAMINOPHEN 325 MG/1
650 TABLET ORAL EVERY 6 HOURS
Status: DISCONTINUED | OUTPATIENT
Start: 2023-03-23 | End: 2023-03-24 | Stop reason: HOSPADM

## 2023-03-23 RX ORDER — SODIUM CHLORIDE, SODIUM LACTATE, POTASSIUM CHLORIDE, CALCIUM CHLORIDE 600; 310; 30; 20 MG/100ML; MG/100ML; MG/100ML; MG/100ML
INJECTION, SOLUTION INTRAVENOUS CONTINUOUS
Status: DISCONTINUED | OUTPATIENT
Start: 2023-03-23 | End: 2023-03-23

## 2023-03-23 RX ORDER — PROPOFOL 10 MG/ML
INJECTION, EMULSION INTRAVENOUS CONTINUOUS PRN
Status: DISCONTINUED | OUTPATIENT
Start: 2023-03-23 | End: 2023-03-23 | Stop reason: SDUPTHER

## 2023-03-23 RX ORDER — MIDAZOLAM HYDROCHLORIDE 1 MG/ML
INJECTION INTRAMUSCULAR; INTRAVENOUS PRN
Status: DISCONTINUED | OUTPATIENT
Start: 2023-03-23 | End: 2023-03-23 | Stop reason: SDUPTHER

## 2023-03-23 RX ORDER — ONDANSETRON 4 MG/1
4 TABLET, ORALLY DISINTEGRATING ORAL EVERY 8 HOURS PRN
Status: DISCONTINUED | OUTPATIENT
Start: 2023-03-23 | End: 2023-03-24 | Stop reason: HOSPADM

## 2023-03-23 RX ORDER — SODIUM CHLORIDE 0.9 % (FLUSH) 0.9 %
5-40 SYRINGE (ML) INJECTION EVERY 12 HOURS SCHEDULED
Status: DISCONTINUED | OUTPATIENT
Start: 2023-03-23 | End: 2023-03-23 | Stop reason: HOSPADM

## 2023-03-23 RX ORDER — SODIUM CHLORIDE 0.9 % (FLUSH) 0.9 %
5-40 SYRINGE (ML) INJECTION PRN
Status: DISCONTINUED | OUTPATIENT
Start: 2023-03-23 | End: 2023-03-23 | Stop reason: HOSPADM

## 2023-03-23 RX ORDER — TRANEXAMIC ACID 100 MG/ML
INJECTION, SOLUTION INTRAVENOUS PRN
Status: DISCONTINUED | OUTPATIENT
Start: 2023-03-23 | End: 2023-03-23 | Stop reason: SDUPTHER

## 2023-03-23 RX ORDER — EPHEDRINE SULFATE/0.9% NACL/PF 50 MG/5 ML
SYRINGE (ML) INTRAVENOUS PRN
Status: DISCONTINUED | OUTPATIENT
Start: 2023-03-23 | End: 2023-03-23 | Stop reason: SDUPTHER

## 2023-03-23 RX ORDER — FENTANYL CITRATE 50 UG/ML
50 INJECTION, SOLUTION INTRAMUSCULAR; INTRAVENOUS EVERY 5 MIN PRN
Status: DISCONTINUED | OUTPATIENT
Start: 2023-03-23 | End: 2023-03-23

## 2023-03-23 RX ORDER — DIPHENHYDRAMINE HCL 25 MG
25 TABLET ORAL EVERY 6 HOURS PRN
Status: DISCONTINUED | OUTPATIENT
Start: 2023-03-23 | End: 2023-03-24 | Stop reason: HOSPADM

## 2023-03-23 RX ORDER — ONDANSETRON 2 MG/ML
4 INJECTION INTRAMUSCULAR; INTRAVENOUS
Status: ACTIVE | OUTPATIENT
Start: 2023-03-23 | End: 2023-03-24

## 2023-03-23 RX ORDER — SODIUM CHLORIDE 0.9 % (FLUSH) 0.9 %
5-40 SYRINGE (ML) INJECTION EVERY 12 HOURS SCHEDULED
Status: DISCONTINUED | OUTPATIENT
Start: 2023-03-23 | End: 2023-03-24 | Stop reason: HOSPADM

## 2023-03-23 RX ORDER — BUPIVACAINE HYDROCHLORIDE 2.5 MG/ML
INJECTION, SOLUTION EPIDURAL; INFILTRATION; INTRACAUDAL
Status: COMPLETED | OUTPATIENT
Start: 2023-03-23 | End: 2023-03-23

## 2023-03-23 RX ORDER — CLINDAMYCIN PHOSPHATE 900 MG/50ML
900 INJECTION INTRAVENOUS EVERY 8 HOURS
Status: DISCONTINUED | OUTPATIENT
Start: 2023-03-23 | End: 2023-03-23 | Stop reason: HOSPADM

## 2023-03-23 RX ORDER — LEVOTHYROXINE SODIUM 0.1 MG/1
100 TABLET ORAL DAILY
Status: DISCONTINUED | OUTPATIENT
Start: 2023-03-24 | End: 2023-03-24 | Stop reason: HOSPADM

## 2023-03-23 RX ORDER — HYDROMORPHONE HYDROCHLORIDE 4 MG/1
4 TABLET ORAL EVERY 6 HOURS PRN
Status: DISCONTINUED | OUTPATIENT
Start: 2023-03-23 | End: 2023-03-24 | Stop reason: HOSPADM

## 2023-03-23 RX ORDER — SODIUM CHLORIDE 0.9 % (FLUSH) 0.9 %
5-40 SYRINGE (ML) INJECTION PRN
Status: DISCONTINUED | OUTPATIENT
Start: 2023-03-23 | End: 2023-03-24 | Stop reason: HOSPADM

## 2023-03-23 RX ORDER — LISINOPRIL 5 MG/1
2.5 TABLET ORAL DAILY
Status: DISCONTINUED | OUTPATIENT
Start: 2023-03-24 | End: 2023-03-24 | Stop reason: HOSPADM

## 2023-03-23 RX ORDER — BUPIVACAINE HYDROCHLORIDE 7.5 MG/ML
INJECTION, SOLUTION INTRASPINAL
Status: COMPLETED | OUTPATIENT
Start: 2023-03-23 | End: 2023-03-23

## 2023-03-23 RX ORDER — ONDANSETRON 2 MG/ML
4 INJECTION INTRAMUSCULAR; INTRAVENOUS EVERY 6 HOURS PRN
Status: DISCONTINUED | OUTPATIENT
Start: 2023-03-23 | End: 2023-03-24 | Stop reason: HOSPADM

## 2023-03-23 RX ORDER — SODIUM CHLORIDE, SODIUM LACTATE, POTASSIUM CHLORIDE, CALCIUM CHLORIDE 600; 310; 30; 20 MG/100ML; MG/100ML; MG/100ML; MG/100ML
INJECTION, SOLUTION INTRAVENOUS CONTINUOUS
Status: DISCONTINUED | OUTPATIENT
Start: 2023-03-23 | End: 2023-03-23 | Stop reason: HOSPADM

## 2023-03-23 RX ORDER — DIAZEPAM 5 MG/1
5 TABLET ORAL EVERY 6 HOURS PRN
Status: DISCONTINUED | OUTPATIENT
Start: 2023-03-23 | End: 2023-03-24 | Stop reason: HOSPADM

## 2023-03-23 RX ORDER — BUPIVACAINE HYDROCHLORIDE AND EPINEPHRINE 2.5; 5 MG/ML; UG/ML
INJECTION, SOLUTION INFILTRATION; PERINEURAL PRN
Status: DISCONTINUED | OUTPATIENT
Start: 2023-03-23 | End: 2023-03-23 | Stop reason: HOSPADM

## 2023-03-23 RX ORDER — DEXAMETHASONE SODIUM PHOSPHATE 10 MG/ML
4 INJECTION, SOLUTION INTRAMUSCULAR; INTRAVENOUS
Status: DISPENSED | OUTPATIENT
Start: 2023-03-23 | End: 2023-03-24

## 2023-03-23 RX ORDER — GABAPENTIN 300 MG/1
300 CAPSULE ORAL ONCE
Status: COMPLETED | OUTPATIENT
Start: 2023-03-23 | End: 2023-03-23

## 2023-03-23 RX ORDER — ROSUVASTATIN CALCIUM 10 MG/1
20 TABLET, COATED ORAL NIGHTLY
Status: DISCONTINUED | OUTPATIENT
Start: 2023-03-24 | End: 2023-03-24 | Stop reason: HOSPADM

## 2023-03-23 RX ORDER — HYDRALAZINE HYDROCHLORIDE 20 MG/ML
10 INJECTION INTRAMUSCULAR; INTRAVENOUS EVERY 6 HOURS PRN
Status: DISCONTINUED | OUTPATIENT
Start: 2023-03-23 | End: 2023-03-24 | Stop reason: HOSPADM

## 2023-03-23 RX ORDER — DIPHENHYDRAMINE HYDROCHLORIDE 50 MG/ML
25 INJECTION INTRAMUSCULAR; INTRAVENOUS EVERY 6 HOURS PRN
Status: DISCONTINUED | OUTPATIENT
Start: 2023-03-23 | End: 2023-03-24 | Stop reason: HOSPADM

## 2023-03-23 RX ORDER — KETOROLAC TROMETHAMINE 30 MG/ML
30 INJECTION, SOLUTION INTRAMUSCULAR; INTRAVENOUS EVERY 6 HOURS PRN
Status: COMPLETED | OUTPATIENT
Start: 2023-03-23 | End: 2023-03-24

## 2023-03-23 RX ADMIN — HYDROMORPHONE HYDROCHLORIDE 4 MG: 4 TABLET ORAL at 22:12

## 2023-03-23 RX ADMIN — BUPIVACAINE HYDROCHLORIDE 20 ML: 2.5 INJECTION, SOLUTION EPIDURAL; INFILTRATION; INTRACAUDAL; PERINEURAL at 08:32

## 2023-03-23 RX ADMIN — PROPOFOL 50 MCG/KG/MIN: 10 INJECTION, EMULSION INTRAVENOUS at 09:02

## 2023-03-23 RX ADMIN — KETOROLAC TROMETHAMINE 30 MG: 30 INJECTION, SOLUTION INTRAMUSCULAR; INTRAVENOUS at 12:40

## 2023-03-23 RX ADMIN — CEFAZOLIN 2000 MG: 2 INJECTION, POWDER, FOR SOLUTION INTRAMUSCULAR; INTRAVENOUS at 20:45

## 2023-03-23 RX ADMIN — MIDAZOLAM HYDROCHLORIDE 4 MG: 2 INJECTION, SOLUTION INTRAMUSCULAR; INTRAVENOUS at 08:51

## 2023-03-23 RX ADMIN — HYDROMORPHONE HYDROCHLORIDE 4 MG: 4 TABLET ORAL at 12:21

## 2023-03-23 RX ADMIN — KETOROLAC TROMETHAMINE 30 MG: 30 INJECTION, SOLUTION INTRAMUSCULAR; INTRAVENOUS at 19:27

## 2023-03-23 RX ADMIN — ACETAMINOPHEN 1000 MG: 500 TABLET ORAL at 07:36

## 2023-03-23 RX ADMIN — CLINDAMYCIN PHOSPHATE 900 MG: 900 INJECTION, SOLUTION INTRAVENOUS at 09:02

## 2023-03-23 RX ADMIN — TRANEXAMIC ACID 1000 MG: 1 INJECTION, SOLUTION INTRAVENOUS at 09:02

## 2023-03-23 RX ADMIN — MIDAZOLAM HYDROCHLORIDE 4 MG: 2 INJECTION, SOLUTION INTRAMUSCULAR; INTRAVENOUS at 08:32

## 2023-03-23 RX ADMIN — GABAPENTIN 300 MG: 300 CAPSULE ORAL at 07:36

## 2023-03-23 RX ADMIN — SODIUM CHLORIDE, POTASSIUM CHLORIDE, SODIUM LACTATE AND CALCIUM CHLORIDE: 600; 310; 30; 20 INJECTION, SOLUTION INTRAVENOUS at 08:51

## 2023-03-23 RX ADMIN — BUPIVACAINE HYDROCHLORIDE IN DEXTROSE 12 MG: 7.5 INJECTION, SOLUTION SUBARACHNOID at 08:51

## 2023-03-23 RX ADMIN — SODIUM CHLORIDE, POTASSIUM CHLORIDE, SODIUM LACTATE AND CALCIUM CHLORIDE: 600; 310; 30; 20 INJECTION, SOLUTION INTRAVENOUS at 07:36

## 2023-03-23 RX ADMIN — ACETAMINOPHEN 650 MG: 325 TABLET ORAL at 20:45

## 2023-03-23 RX ADMIN — SODIUM CHLORIDE, PRESERVATIVE FREE 10 ML: 5 INJECTION INTRAVENOUS at 20:46

## 2023-03-23 RX ADMIN — Medication 10 MG: at 09:37

## 2023-03-23 ASSESSMENT — PAIN DESCRIPTION - FREQUENCY
FREQUENCY: INTERMITTENT

## 2023-03-23 ASSESSMENT — PAIN SCALES - GENERAL
PAINLEVEL_OUTOF10: 2
PAINLEVEL_OUTOF10: 8
PAINLEVEL_OUTOF10: 3
PAINLEVEL_OUTOF10: 9
PAINLEVEL_OUTOF10: 2
PAINLEVEL_OUTOF10: 6
PAINLEVEL_OUTOF10: 3
PAINLEVEL_OUTOF10: 4
PAINLEVEL_OUTOF10: 5
PAINLEVEL_OUTOF10: 2
PAINLEVEL_OUTOF10: 5
PAINLEVEL_OUTOF10: 2
PAINLEVEL_OUTOF10: 2
PAINLEVEL_OUTOF10: 3
PAINLEVEL_OUTOF10: 4
PAINLEVEL_OUTOF10: 9

## 2023-03-23 ASSESSMENT — PAIN - FUNCTIONAL ASSESSMENT
PAIN_FUNCTIONAL_ASSESSMENT: ACTIVITIES ARE NOT PREVENTED
PAIN_FUNCTIONAL_ASSESSMENT: PREVENTS OR INTERFERES SOME ACTIVE ACTIVITIES AND ADLS
PAIN_FUNCTIONAL_ASSESSMENT: ACTIVITIES ARE NOT PREVENTED
PAIN_FUNCTIONAL_ASSESSMENT: ACTIVITIES ARE NOT PREVENTED
PAIN_FUNCTIONAL_ASSESSMENT: PREVENTS OR INTERFERES SOME ACTIVE ACTIVITIES AND ADLS
PAIN_FUNCTIONAL_ASSESSMENT: ACTIVITIES ARE NOT PREVENTED
PAIN_FUNCTIONAL_ASSESSMENT: PREVENTS OR INTERFERES SOME ACTIVE ACTIVITIES AND ADLS
PAIN_FUNCTIONAL_ASSESSMENT: ACTIVITIES ARE NOT PREVENTED
PAIN_FUNCTIONAL_ASSESSMENT: ACTIVITIES ARE NOT PREVENTED
PAIN_FUNCTIONAL_ASSESSMENT: NONE - DENIES PAIN
PAIN_FUNCTIONAL_ASSESSMENT: ACTIVITIES ARE NOT PREVENTED
PAIN_FUNCTIONAL_ASSESSMENT: PREVENTS OR INTERFERES SOME ACTIVE ACTIVITIES AND ADLS

## 2023-03-23 ASSESSMENT — PAIN DESCRIPTION - DESCRIPTORS
DESCRIPTORS: ACHING
DESCRIPTORS: ACHING;DULL
DESCRIPTORS: ACHING
DESCRIPTORS: ACHING;SORE
DESCRIPTORS: ACHING;SORE
DESCRIPTORS: ACHING
DESCRIPTORS: ACHING;DULL;SORE
DESCRIPTORS: SHARP
DESCRIPTORS: ACHING;DULL
DESCRIPTORS: ACHING;DULL;SORE
DESCRIPTORS: SHARP
DESCRIPTORS: ACHING;SORE
DESCRIPTORS: SHARP
DESCRIPTORS: SHARP
DESCRIPTORS: ACHING

## 2023-03-23 ASSESSMENT — PAIN DESCRIPTION - LOCATION
LOCATION: KNEE

## 2023-03-23 ASSESSMENT — PAIN DESCRIPTION - ONSET
ONSET: GRADUAL

## 2023-03-23 ASSESSMENT — PAIN DESCRIPTION - PAIN TYPE
TYPE: SURGICAL PAIN

## 2023-03-23 ASSESSMENT — PAIN DESCRIPTION - ORIENTATION
ORIENTATION: RIGHT

## 2023-03-23 NOTE — PERIOP NOTE
Physical therapy in room with patient. Patient assisted out of bed, gait belt in place. Ambulating with walker. No distress noted.

## 2023-03-23 NOTE — DISCHARGE INSTRUCTIONS
while sleeping at night. Elevate your heel so your  is straight while sleeping at night. 16.  Perform deep breathing exercises 10 times every hour while awake. 17.  If you had a nerve block and you are not having pain the day of the surgery, at nighttime it is okay to take 1 pain medication before going to sleep to help prevent excruciating pain when the nerve block wears off. 18.  You may be given an ice pack machine use that to help prevent swelling. Do not apply heat to the incision area. 19.  While you are awake at least 10 times every 30 minutes move your foot up and down as if you are pumping gas from both feet to help prevent swelling and to promote blood circulation in the calf. 20.  If you develop sudden onset of shortness of breath or severe calf pain please go to closest emergency room. 21. Your pain medicine is a Narcotic and may cause constipation. You may take an over the counter stool softener while taking pain medicine. 25.  You will get surveys either via text message or email after your surgery on a periodic basis. Please participate in the surveys as it helps to track your progress. ICE THERAPY WRAP:    Keep ice therapy wrap on when resting. DO not wear when moving or walking. Ice packs are reusable. Ice Therapy wrap holds two ice packs at a time. Things to watch for:             Increased swelling of the surgical site             Spreading of redness around the incision site             Drainage of pus from the incision site             Developing a fever of 101.5 °F or higher             If any of these symptoms occur you have any questions please contact our office at [phone]. If you need to talk to Dr. Sven Rosas or his staff after hours please call the office and have the on-call service get in touch with the provider on call that day. Please note pain medications are not refilled after hours or on weekends.   If Dr. Sven Rosas or his staff do not call you back within 30 minutes. Please tell the  to try again. Phone: [phone]  www. [employer]

## 2023-03-23 NOTE — H&P
6/18 start weight 251 but did not do; did keto/low carb 6/18    SHWETA (obstructive sleep apnea) 2006    Dr. Tom Napoles    Osteoarthritis of both knees 2017    Peripheral neuropathy 02/2013    on EMG negative serologies    PFO (patent foramen ovale)       Past Surgical History:   Procedure Laterality Date    COLONOSCOPY      Dr. Zi Moscoso 2008 polyp; Dr Linda Bruno 2/3/12 neg; Dr Francisco Steve (11/22) neg    HEENT  08/2022    US thyroid neg    HERNIA REPAIR      JOINT REPLACEMENT Left 01/27/2023    lt tka    KNEE ARTHROSCOPY Left 3-4 yrs ago    [address] Right 3/23/2023    RIGHT TKA performed by Yeni Bocanegra MD at [facility] MAIN OR     Family History   Problem Relation Age of Onset    Hypertension Father     Heart Disease Father     Rheum Arthritis Mother     Cancer Mother         renal and kisney cancer    Hypertension Mother       Social History     Tobacco Use    Smoking status: Never    Smokeless tobacco: Never   Substance Use Topics    Alcohol use: No       Prior to Admission medications    Medication Sig Start Date End Date Taking? Authorizing Provider   diazePAM (VALIUM) 5 MG tablet Take 5 mg by mouth every 6 hours as needed for Anxiety. Yes Historical Provider, MD   HYDROmorphone (DILAUDID) 2 MG tablet Take 2 tablets by mouth every 6 hours as needed for Pain for up to 8 days. Max Daily Amount: 16 mg 3/20/23 3/28/23  RO King   cephALEXin (KEFLEX) 500 MG capsule Take 1 capsule by mouth 4 times daily for 10 days 3/16/23 3/26/23  RO King   HYDROmorphone (DILAUDID) 4 MG tablet Take 1 tablet by mouth every 6 hours as needed for Pain for up to 8 days. DO NOT START PAIN MEDICATION UNTIL AFTER SURGERY Max Daily Amount: 16 mg 3/16/23 3/24/23  RO King   HYDROmorphone (DILAUDID) 2 MG tablet TAKE 1 TABLET BY MOUTH EVERY 6 HOURS AS NEEDED FOR PAIN FOR UP TO 8 DAYS. MAX DAILY AMOUNT: 8 MG.  THIS RX IS TO ONLY BE USED AFTER SURGICAL PROCEDURE 2/10/23   Historical

## 2023-03-23 NOTE — THERAPY EVALUATION
PHYSICAL THERAPY EVALUATION AND DISCHARGE    Patient: Gabe Wallace (89 y.o. male)  Date: 3/23/2023  Physical Therapy Time:   PT Individual Minutes  Time In: 8043  Time Out: 1500  Minutes: 25  Timed Minute Breakdown:  PTE: 25        Primary Diagnosis: Osteoarthritis of right knee, unspecified osteoarthritis type [M17.11]  S/P TKR (total knee replacement), right [Z96.651] S/P TKR (total knee replacement), right  Present on Admission:   S/P TKR (total knee replacement), right   Procedure(s) (LRB):  RIGHT TKA (Right) Day of Surgery   Precautions: WBAT  Restrictions/Precautions  Restrictions/Precautions: Weight Bearing Lower Extremity Weight Bearing Restrictions  Right Lower Extremity Weight Bearing: Weight Bearing As Tolerated    Discharge Recommendations: Outpatient PT    Conditions Requiring skilled therapeutic intervention:    R TKA    Evaluation Activity Tolerance:   Activity Tolerance  Activity Tolerance: Patient tolerated evaluation without incident    Equipment Recommendations for Discharge:   none    AM-PAC   24; Current research shows that an AM-PAC score of 17 or less is typically not associated with a discharge to the patient's home setting, whereas a score of 18 or greater is typically associated with a discharge to the patient's home setting.     Factors which may influence rehabilitation potential include:  No factors noted     PLAN :   DC home with outpatient PT     SUBJECTIVE:     See below    OBJECTIVE DATA SUMMARY:     Past Medical History:   Diagnosis Date    Allergic rhinitis, seasonal     Anxiety     JUDD-7 was 11/21 from 7/11    [facility]    CVA (cerebral vascular accident) [facility]) 2006    Dr. Mikayla Hobson s/p PFO closure, carotids negative    Degenerative arthritis of cervical spine 2013    on mri    Depression     PHQ-9 was 7/27 from (7/11); 10/27 from (5/15); lexapro intol; effexor briefly 2020    Diverticulosis     Dyslipidemia     intol lipitor, prava, zocor; tolerated mevacor

## 2023-03-23 NOTE — ANESTHESIA POSTPROCEDURE EVALUATION
Department of Anesthesiology  Postprocedure Note    Patient: Yelitza Boston  MRN: [id number]  YOB: 1960  Date of evaluation: 3/23/2023      Procedure Summary     Date: 03/23/23 Room / Location: [facility] MAIN 03 / [facility] MAIN OR    Anesthesia Start: 0851 Anesthesia Stop: 1110    Procedure: RIGHT TKA (Right: Knee) Diagnosis:       Osteoarthritis of right knee, unspecified osteoarthritis type      (Osteoarthritis of right knee, unspecified osteoarthritis type [M17.11])    Surgeons: Shawanda Zepeda MD Responsible Provider: RO Chamberlain CRNA    Anesthesia Type: Regional, Spinal ASA Status: 2          Anesthesia Type: Regional, Spinal    Clayton Phase I: Clayton Score: 10    Clayton Phase II:        Anesthesia Post Evaluation    Patient location during evaluation: bedside  Patient participation: complete - patient participated  Level of consciousness: awake and alert  Pain score: 0  Airway patency: patent  Nausea & Vomiting: no nausea and no vomiting  Complications: no  Cardiovascular status: hemodynamically stable  Respiratory status: acceptable and room air  Hydration status: euvolemic  Multimodal analgesia pain management approach

## 2023-03-23 NOTE — PERIOP NOTE
Pt unable to void after ambulating to the restroom. Reports pressure to lower abdomen. Straight catheter used in sterile manner - unable to pass catheter the first try. Called for a coude catheter size 12F passed with ease. Pt reports relief of abdominal pressure. Dr. Nevin Driver informed and would like the patient to stay the night since he had to be straight cathed x 2 today with 1,100 ml out the first time and 700 ml out the 2nd time. Supervisor aware. Will go to room 257. Wife going home to get CPAP.

## 2023-03-23 NOTE — ANESTHESIA PRE PROCEDURE
Rene Bring, APRN        sodium chloride flush 0.9 % injection 5-40 mL  5-40 mL IntraVENous PRN Omar Mckinney, APRN        clindamycin (CLEOCIN) 900 mg in dextrose 5 % 50 mL IVPB  900 mg IntraVENous Q8H Omar Mckinney, APRN           Allergies: Allergies   Allergen Reactions    Atorvastatin Myalgia    Duloxetine Other (See Comments)     Gi upset    Erythromycin Diarrhea    Escitalopram Oxalate Other (See Comments)     Gi upset    Pravastatin Myalgia    Simvastatin Myalgia    Codeine Other (See Comments) and Nausea And Vomiting     Other reaction(s): other/intolerance      Penicillins Itching and Rash     Rash around collar area      Sulfa Antibiotics Rash       Problem List:    Patient Active Problem List   Diagnosis Code    Hypothyroidism due to acquired atrophy of thyroid E03.4    Dyslipidemia E78.5    Hypovitaminosis D E55.9    Colon polyps K63.5    IFG (impaired fasting glucose) R73.01    Neuropathy G62.9    Hypogonadism male E29.1    Anxiety F41.9    SHWETA (obstructive sleep apnea) G47.33    Gastroesophageal reflux disease without esophagitis K21.9    Primary osteoarthritis involving multiple joints M15.9    Obesity (BMI 30.0-34. 9) E66.9    CVA [facility]cerebral vascular accident) ([facility] [address] I63.9    Essential hypertension I10    Complex tear of medial meniscus of left knee as current injury S80.12A    OA (osteoarthritis) of knee M17.9       Past Medical History:        Diagnosis Date    Allergic rhinitis, seasonal     Anxiety     JUDD-7 was 11/21 from 7/11    Colon polyps 2008    Dr. Estiven Tilley CVA (cerebral vascular accident[facility]) 2006    Dr. Kar Traore s/p PFO closure, carotids negative    Degenerative arthritis of cervical spine 2013    on mri    Depression     PHQ-9 was 7/27 from (7/11); 10/27 from (5/15); lexapro intol; effexor briefly 2020    Diverticulosis     Dyslipidemia     intol lipitor, prava, zocor; tolerated mevacor    FHx: heart disease     GERD (gastroesophageal reflux disease)     egd

## 2023-03-23 NOTE — ANESTHESIA PROCEDURE NOTES
Spinal Block    Patient location during procedure: OR  End time: 3/23/2023 9:02 AM  Reason for block: primary anesthetic and at surgeon's request  Staffing  Performed: resident/CRNA   Resident/CRNA: RO Escobar - CRNA  Spinal Block  Patient position: sitting  Prep: Betadine  Patient monitoring: cardiac monitor, continuous pulse ox, continuous capnometry, oxygen and frequent blood pressure checks  Approach: midline  Location: L2/L3  Guidance: ultrasound guided  Provider prep: mask and sterile gloves  Local infiltration: lidocaine  Needle  Needle type: Blair   Needle gauge: 25 G  Needle length: 3.5 in  Assessment  Sensory level: T10  Events: cerebrospinal fluid  Swirl obtained: Yes  CSF: clear  Attempts: 1  Hemodynamics: stable  Additional Notes  Spinal with ease. Patient tolerates well. No complications.    Preanesthetic Checklist  Completed: patient identified, IV checked, site marked, risks and benefits discussed, surgical/procedural consents, equipment checked, pre-op evaluation, timeout performed, anesthesia consent given, oxygen available, monitors applied/VS acknowledged, fire risk safety assessment completed and verbalized and blood product R/B/A discussed and consented

## 2023-03-23 NOTE — PLAN OF CARE
Problem: Chronic Conditions and Co-morbidities  Goal: Patient's chronic conditions and co-morbidity symptoms are monitored and maintained or improved  Outcome: Progressing     Problem: ABCDS Injury Assessment  Goal: Absence of physical injury  Outcome: Progressing     Problem: Pain  Goal: Verbalizes/displays adequate comfort level or baseline comfort level  Outcome: Progressing     Problem: Discharge Planning  Goal: Discharge to home or other facility with appropriate resources  Outcome: Progressing

## 2023-03-23 NOTE — INTERVAL H&P NOTE
Update History & Physical    The Patient's History and Physical was reviewed with the patient. The patient was examined. There was no change. The surgical site was confirmed by the patient and me. Patient understands and wants to proceed with the procedure. If applicable, I have discussed with the patient / power of  the rationale for blood component transfusion; its benefits in treating or preventing fatigue, organ damage, or death; and its risk which includes mild transfusion reactions, rare risk of blood borne infection, or more serious but rare reactions. I have discussed the alternatives to transfusion, including the risk and consequences of not receiving transfusion. The patient / Yu Semen of  had an opportunity to ask questions and had agreed to proceed with transfusion of blood components. Plan:  The risk, benefits, expected outcome, and alternative to the recommended procedure have been discussed with the patient.

## 2023-03-23 NOTE — ANESTHESIA PROCEDURE NOTES
Peripheral Block    Patient location during procedure: pre-op  Reason for block: post-op pain management and at surgeon's request  Start time: 3/23/2023 8:32 AM  End time: 3/23/2023 8:35 AM  Staffing  Performed: resident/CRNA   Resident/CRNA: Ashly Leiva RN  Other anesthesia staff: RO Good CRNA  Preanesthetic Checklist  Completed: patient identified, IV checked, site marked, risks and benefits discussed, surgical/procedural consents, equipment checked, pre-op evaluation, timeout performed, anesthesia consent given, oxygen available, monitors applied/VS acknowledged, fire risk safety assessment completed and verbalized and blood product R/B/A discussed and consented  Peripheral Block   Patient position: supine  Prep: ChloraPrep  Provider prep: mask  Patient monitoring: continuous pulse ox, frequent blood pressure checks, IV access, responsive to questions and oxygen  Block type: Saphenous  Laterality: right  Injection technique: single-shot  Guidance: ultrasound guided    Needle   Needle type: short-bevel   Needle gauge: 20 G  Needle localization: ultrasound guidance  Test dose: negative  Needle length: 4 cm.   Assessment   Injection assessment: negative aspiration for heme, no paresthesia on injection, local visualized surrounding nerve on ultrasound and no intravascular symptoms  Paresthesia pain: none  Slow fractionated injection: yes  Hemodynamics: stable  Real-time US image taken/store: yes  Outcomes: uncomplicated and patient tolerated procedure well    Medications Administered  bupivacaine (PF) 0.25 % - Perineural   20 mL - 3/23/2023 8:32:00 AM  dexamethasone 4 MG/ML - Perineural   4 mg - 3/23/2023 8:32:00 AM

## 2023-03-23 NOTE — OP NOTE
[employer]    Lot number: [id number] Device identifier: [id number]    Device identifier type: GS1        GUDID Information       Request status Successful        Brand name: testhub Version/Model: 1518-    Company name: Lorraine Mcclendon ([employer]) MRI safety info as of 2/13/23: Labeling does not contain MRI Safety Information    Contains dry or latex rubber: No      GMDN P.T. name: Polyethylene patella prosthesis                As of 2/13/2023       Status: Unknown                      (Historical) Component Fem Sz 7 L Knee Post Stbl Oneal Attune - Implanted   (Left) Knee      Model/Cat number: [id number] : Ritesh Carlos [employer]    Lot number: [id number] Device identifier: [id number]    Device identifier type: GS1        GUDID Information       Request status Successful        Brand name: ANG Version/Model: 1504-    Company name: Lorraine Mcclendon [employer]) MRI safety info as of 2/13/23: Labeling does not contain MRI Safety Information    Contains dry or latex rubber: No      GMDN P.T. name: Uncoated knee femur prosthesis, metallic                As of 2/13/2023       Status: Unknown                      (Historical) Insert Tib Sz 7 Thk5mm Knee Post Stbl Rot Platfrm Attune - Implanted   (Left) Knee      Model/Cat number: [id number] : [employer]el    Lot number: [id number] Device identifier: [id number]    Device identifier type: GS1        GUDID Information       Request status Successful        Brand name: testhub Version/Model: 7303-    Company name: [employer]raine [employer]y [employer]) MRI safety info as of 2/13/23: Labeling does not contain MRI Safety Information    Contains dry or latex rubber: No      GMDN P.T. name: Tibial insert                As of 2/13/2023       Status: Unknown                      (Historical) Knee K1 Tot Marcelino Std Oneal Impl Capped Synthes - Implanted   (Left) Knee      Model/Cat number: [id number] : ipDatatel lower than 30. Surgical time increased approximately 30% from the normal surgical time due to the patient's high BMI. Because of the high BMI patient's knee would be considered a complex total knee replacement rather than a standard total knee replacement.       Kamila Sunshine MD

## 2023-03-24 ENCOUNTER — APPOINTMENT (OUTPATIENT)
Facility: HOSPITAL | Age: 63
End: 2023-03-24
Payer: COMMERCIAL

## 2023-03-24 VITALS
SYSTOLIC BLOOD PRESSURE: 116 MMHG | DIASTOLIC BLOOD PRESSURE: 74 MMHG | WEIGHT: 243.5 LBS | HEIGHT: 72 IN | BODY MASS INDEX: 32.98 KG/M2 | OXYGEN SATURATION: 93 % | RESPIRATION RATE: 16 BRPM | HEART RATE: 101 BPM | TEMPERATURE: 98.8 F

## 2023-03-24 LAB
ANION GAP SERPL CALC-SCNC: 6 MMOL/L (ref 3–18)
BUN SERPL-MCNC: 24 MG/DL (ref 7–18)
BUN/CREAT SERPL: 31 (ref 12–20)
CA-I BLD-MCNC: 8.6 MG/DL (ref 8.5–10.1)
CHLORIDE SERPL-SCNC: 110 MMOL/L (ref 100–111)
CO2 SERPL-SCNC: 23 MMOL/L (ref 21–32)
CREAT SERPL-MCNC: 0.78 MG/DL (ref 0.6–1.3)
ERYTHROCYTE [DISTWIDTH] IN BLOOD BY AUTOMATED COUNT: 13.7 % (ref 11.6–14.5)
GLUCOSE SERPL-MCNC: 131 MG/DL (ref 74–99)
HCT VFR BLD AUTO: 37.4 % (ref 36–48)
HGB BLD-MCNC: 11.4 G/DL (ref 13–16)
MCH RBC QN AUTO: 29 PG (ref 24–34)
MCHC RBC AUTO-ENTMCNC: 30.5 G/DL (ref 31–37)
MCV RBC AUTO: 95.2 FL (ref 78–100)
NRBC # BLD: 0 K/UL (ref 0–0.01)
NRBC BLD-RTO: 0 PER 100 WBC
PLATELET # BLD AUTO: 191 K/UL (ref 135–420)
PMV BLD AUTO: 11.2 FL (ref 9.2–11.8)
POTASSIUM SERPL-SCNC: 4.1 MMOL/L (ref 3.5–5.5)
RBC # BLD AUTO: 3.93 M/UL (ref 4.35–5.65)
SODIUM SERPL-SCNC: 139 MMOL/L (ref 136–145)
WBC # BLD AUTO: 8.3 K/UL (ref 4.6–13.2)

## 2023-03-24 PROCEDURE — 85027 COMPLETE CBC AUTOMATED: CPT

## 2023-03-24 PROCEDURE — 6370000000 HC RX 637 (ALT 250 FOR IP): Performed by: NURSE PRACTITIONER

## 2023-03-24 PROCEDURE — 96367 TX/PROPH/DG ADDL SEQ IV INF: CPT

## 2023-03-24 PROCEDURE — 6360000002 HC RX W HCPCS: Performed by: NURSE PRACTITIONER

## 2023-03-24 PROCEDURE — 36415 COLL VENOUS BLD VENIPUNCTURE: CPT

## 2023-03-24 PROCEDURE — 97110 THERAPEUTIC EXERCISES: CPT

## 2023-03-24 PROCEDURE — G0378 HOSPITAL OBSERVATION PER HR: HCPCS

## 2023-03-24 PROCEDURE — 96376 TX/PRO/DX INJ SAME DRUG ADON: CPT

## 2023-03-24 PROCEDURE — 97116 GAIT TRAINING THERAPY: CPT

## 2023-03-24 PROCEDURE — 51798 US URINE CAPACITY MEASURE: CPT

## 2023-03-24 PROCEDURE — 2580000003 HC RX 258: Performed by: NURSE PRACTITIONER

## 2023-03-24 PROCEDURE — 80048 BASIC METABOLIC PNL TOTAL CA: CPT

## 2023-03-24 RX ADMIN — ACETAMINOPHEN 650 MG: 325 TABLET ORAL at 09:59

## 2023-03-24 RX ADMIN — SODIUM CHLORIDE, PRESERVATIVE FREE 10 ML: 5 INJECTION INTRAVENOUS at 10:03

## 2023-03-24 RX ADMIN — ACETAMINOPHEN 650 MG: 325 TABLET ORAL at 01:18

## 2023-03-24 RX ADMIN — HYDROMORPHONE HYDROCHLORIDE 4 MG: 4 TABLET ORAL at 04:26

## 2023-03-24 RX ADMIN — CEFAZOLIN 2000 MG: 2 INJECTION, POWDER, FOR SOLUTION INTRAMUSCULAR; INTRAVENOUS at 04:35

## 2023-03-24 RX ADMIN — ASPIRIN 325 MG: 325 TABLET, COATED ORAL at 09:59

## 2023-03-24 RX ADMIN — LISINOPRIL 2.5 MG: 5 TABLET ORAL at 09:59

## 2023-03-24 RX ADMIN — LEVOTHYROXINE SODIUM 100 MCG: 0.1 TABLET ORAL at 07:19

## 2023-03-24 RX ADMIN — KETOROLAC TROMETHAMINE 30 MG: 30 INJECTION, SOLUTION INTRAMUSCULAR; INTRAVENOUS at 01:18

## 2023-03-24 ASSESSMENT — PAIN DESCRIPTION - LOCATION
LOCATION: KNEE

## 2023-03-24 ASSESSMENT — PAIN DESCRIPTION - DESCRIPTORS
DESCRIPTORS: ACHING

## 2023-03-24 ASSESSMENT — PAIN SCALES - GENERAL
PAINLEVEL_OUTOF10: 5
PAINLEVEL_OUTOF10: 7
PAINLEVEL_OUTOF10: 0
PAINLEVEL_OUTOF10: 3

## 2023-03-24 ASSESSMENT — PAIN DESCRIPTION - ORIENTATION
ORIENTATION: RIGHT

## 2023-03-24 NOTE — PLAN OF CARE
Problem: Chronic Conditions and Co-morbidities  Goal: Patient's chronic conditions and co-morbidity symptoms are monitored and maintained or improved  3/24/2023 0513 by Austin Foster RN  Outcome: Progressing  3/23/2023 1708 by Alfredo Adame RN  Outcome: Progressing     Problem: ABCDS Injury Assessment  Goal: Absence of physical injury  3/24/2023 0513 by Austin Foster RN  Outcome: Progressing  3/23/2023 1708 by Alfredo Adame RN  Outcome: Progressing     Problem: Pain  Goal: Verbalizes/displays adequate comfort level or baseline comfort level  3/24/2023 0513 by Austin Foster RN  Outcome: Progressing  3/23/2023 1708 by Alfredo Adame RN  Outcome: Progressing     Problem: Discharge Planning  Goal: Discharge to home or other facility with appropriate resources  3/24/2023 0513 by Austin Foster RN  Outcome: Progressing  3/23/2023 1708 by Alfredo Adame RN  Outcome: Progressing

## 2023-03-24 NOTE — CARE COORDINATION
for transportation at discharge:      Financial    Payor: Nathaneil Goodpasture / Plan: Farhan Rule / Product Type: *No Product type* /     Does insurance require precert for SNF: Yes    Potential assistance Purchasing Medications:    Meds-to-Beds request:        Ricky Lassiter [phone] Morena Sibley [phone] [address] [facility] [address]  Phone: [phone] Fax: [phone]      Notes:    Factors facilitating achievement of predicted outcomes: Family support    Barriers to discharge: Additional Case Management Notes:   RUR:  N/A pt is OBS:  Plan of care includes medication reconciliation to be complete, education will be given with teach back method, and will identify need for post-acute care follow up. Patient centered discharge planning to ensure smooth transition to community and OF. Patient lives at home with wife. Has OP PT set up already. CM following for DC needs. DC POC is to return home and have OP PT. The Plan for Transition of Care is related to the following treatment goals of Osteoarthritis of right knee, unspecified osteoarthritis type [M17.11]  S/P TKR (total knee replacement), right [L25.323]    IF APPLICABLE: The Patient and/or patient representative Jose A Pang and his family were provided with a choice of provider and agrees with the discharge plan. Freedom of choice list with basic dialogue that supports the patient's individualized plan of care/goals and shares the quality data associated with the providers was provided to:     Patient Representative Name:       The Patient and/or Patient Representative Agree with the Discharge Plan?       Viridiana Nolan  Case Management Department  Ph: [phone] Fax:

## 2023-03-24 NOTE — DISCHARGE SUMMARY
CONTINUE these medications which have NOT CHANGED    Details   diazePAM (VALIUM) 5 MG tablet Take 5 mg by mouth every 6 hours as needed for Anxiety. !! HYDROmorphone (DILAUDID) 2 MG tablet Take 2 tablets by mouth every 6 hours as needed for Pain for up to 8 days. Max Daily Amount: 16 mg  Qty: 30 tablet, Refills: 0    Comments: Reduce doses taken as pain becomes manageable  Associated Diagnoses: Primary osteoarthritis of right knee      cephALEXin (KEFLEX) 500 MG capsule Take 1 capsule by mouth 4 times daily for 10 days  Qty: 40 capsule, Refills: 0      !! HYDROmorphone (DILAUDID) 4 MG tablet Take 1 tablet by mouth every 6 hours as needed for Pain for up to 8 days. DO NOT START PAIN MEDICATION UNTIL AFTER SURGERY Max Daily Amount: 16 mg  Qty: 30 tablet, Refills: 0    Comments: Reduce doses taken as pain becomes manageable  Associated Diagnoses: Primary osteoarthritis of right knee      !! HYDROmorphone (DILAUDID) 2 MG tablet TAKE 1 TABLET BY MOUTH EVERY 6 HOURS AS NEEDED FOR PAIN FOR UP TO 8 DAYS. MAX DAILY AMOUNT: 8 MG. THIS RX IS TO ONLY BE USED AFTER SURGICAL PROCEDURE      acetaminophen (TYLENOL) 500 MG tablet Take 500 mg by mouth every 4 hours as needed      aspirin 81 MG EC tablet Take 81 mg by mouth daily      bisacodyl (DULCOLAX) 10 MG suppository Place 10 mg rectally daily as needed      fluticasone (FLONASE) 50 MCG/ACT nasal spray Use 2 spray(s) in each nostril once daily      levothyroxine (SYNTHROID) 100 MCG tablet TAKE 1 TABLET BY MOUTH ONCE DAILY BEFORE BREAKFAST      lisinopril (PRINIVIL;ZESTRIL) 2.5 MG tablet TAKE 1 TABLET BY MOUTH ONCE DAILY FOR HIGH BLOOD PRESSURE      naloxone 4 MG/0.1ML LIQD nasal spray Give 1 spray into 1 nostril. Give additional doses using a new nasal spray with each dose, if patient does not respond or relapses into respiratory depression. Call 911. Additional doses may be given every 2 to 3 minutes until medical assistance arrives.       ondansetron (ZOFRAN-ODT) 4 MG

## 2023-03-24 NOTE — PROGRESS NOTES
0700 - Bedside shift report received. Pt assessment. AM meds given    1110 - PIV removed. Ice packs given. Discharge paperwork reviewed and signed. Denied questions. Taken to front exit of facility via wheelchair by nursing staff.
1645- patient arrived to room 257 via stretcher, patient alert and oriented X4, ambulatory, VSS, orient patient to room and call bell system,     1715- dinner provided    1815- assisted patient to bathroom,    1900- bedside shift report given to [employer]
1900 Bedside shift report from  Deloris, [facility]. Pt states she is in a position of comfort, patient is A&O X4, Post-op Right knee arthroplasty c/o pain to left knee as an 3/10, patient is on room air, up with assist via walker to the bathroom; Safety precautions in place, bed is in the lowest position. Ice packs replaced several times throughout the night. Pt is voiding with strict I&O's, Bladder scanned twice, Pt refused straight cath during night shift due to voiding every couple of hours. 0700 Bedside shift report given to Logan Malin RN.
Assisted pt up to restroom. Used walker. Wife with patient at restroom door. No dizziness reported. VSS prior to getting out of bed.
Bladder scan 578 ml post void of 150 ml urine. Patient refused straight  cath and requests additional time to void. Will continue to monitor and bladder scan in 3 hours.   Notified Freddy Patino NP
Outpatient Progress Note      CHIEF COMPLAINT:    Chief Complaint   Patient presents with   • Foot     Right ankle pain and swollen         HISTORY OF PRESENT ILLNESS:  The patient is a 39 year old male who presents for complaints of a lump right ankle for months- tender no trauma.    Corns on feet tender        Past Medical History:   No past medical history on file.    Past Surgical History:   No past surgical history on file.    Current Medications:    Current Outpatient Medications   Medication Sig Dispense Refill   • simvastatin (ZOCOR) 20 MG tablet Take 1 tablet by mouth nightly. 90 tablet 0   • clindamycin (CLINDAGEL) 1 % gel apply a thin layer to face every morning 60 g 3   • triamcinolone (ARISTOCORT) 0.1 % cream Apply topically 2 times daily. 80 g 0   • sildenafil (Viagra) 100 MG tablet Take 1 tablet by mouth as needed for Erectile Dysfunction. 30 tablet 0     No current facility-administered medications for this visit.       Allergies:    ALLERGIES:  No Known Allergies    SOCIAL HISTORY:  Social History     Tobacco Use   • Smoking status: Never Smoker   • Smokeless tobacco: Never Used   Substance Use Topics   • Alcohol use: Yes     Alcohol/week: 0.0 standard drinks   • Drug use: Yes     Comment: marijuanna         Drug Use:    Yes                Comment: marijuanna      FAMILY HISTORY:  No family history on file.    REVIEW OF SYSTEMS:   CONSTITUTIONAL:  No Fevers/Chills  INTEGUMENTARY:  Positive for Lump    PHYSICAL EXAM:   Visit Vitals  /81 (BP Location: LUE - Left upper extremity, Patient Position: Sitting, Cuff Size: Regular)   Resp 18   Wt 85.7 kg (189 lb)   BMI 29.60 kg/m²       CONSTITUTIONAL: No acute distress, Non-toxic appearing  MUSCULOSKELETAL:  No Clubbing/Cyanosis, Capillary Refill<2 seconds  SKIN:  Warm, Dry.  Inspection and palpation reveals lump right lateral ankle - tender 1 x 1 cm- corn on left plantar aspect left foot  PSYCHIATRIC:  The patient is well-nourished and well-groomed.  
Pt up with Uriah Galo, PT. Had an episode of dizziness. Brought back to room. /74. Water offered. Will monitor. Wife at bedside.
Alert & Oriented x 3.    DATA:  Labs:   Hemoglobin A1C (%)   Date Value   11/10/2020 5.4     Sodium (mmol/L)   Date Value   06/14/2021 140     Potassium (mmol/L)   Date Value   06/14/2021 4.0     Chloride (mmol/L)   Date Value   06/14/2021 107     Carbon Dioxide (mmol/L)   Date Value   06/14/2021 27     BUN (mg/dL)   Date Value   06/14/2021 15     Creatinine (mg/dL)   Date Value   06/14/2021 1.06     Glucose (mg/dL)   Date Value   06/14/2021 73     No results found for: MUTP, URMIC, UCR, MALBCR  Cholesterol (mg/dL)   Date Value   06/14/2021 157     HDL (mg/dL)   Date Value   06/14/2021 49     LDL (mg/dL)   Date Value   06/14/2021 88     Triglycerides (mg/dL)   Date Value   06/14/2021 101       ASSESSMENT AND PLAN:   Acute right ankle pain  (primary encounter diagnosis)  Comment: Poorly Controlled- As ordered.   Plan: SERVICE TO PODIATRY, XR ANKLE 3+ VW RIGHT            Corn  Comment: Poorly Controlled- As ordered.   Plan: SERVICE TO PODIATRY              Willie Sanchez MD   
Right  Stairs - Level of Assistance: Modified independent  Number of Stairs Trained: 8 (4x2)     PT Exercises  Exercise Treatment: Ankle pumps 2x10, seated/ supine heel slides 5x10, quad sets 10x5, LAQ 3x5,         Education  Patient Education  Education Given To: Patient  Education Provided: Role of Therapy;Home Exercise Program  Education Method: Demonstration;Verbal;Printed Information/Hand-outs  Barriers to Learning: None  Education Outcome: Verbalized understanding;Demonstrated understanding    Therapy Time   Individual Concurrent Group Co-treatment   Time In 0920         Time Out 0945         Minutes Marianna [address]

## 2023-03-27 ENCOUNTER — CARE COORDINATION (OUTPATIENT)
Facility: CLINIC | Age: 63
End: 2023-03-27

## 2023-03-27 RX ORDER — DOCUSATE SODIUM 100 MG/1
200 CAPSULE, LIQUID FILLED ORAL NIGHTLY
COMMUNITY

## 2023-03-27 RX ORDER — IBUPROFEN 200 MG
400 TABLET ORAL EVERY 6 HOURS PRN
COMMUNITY

## 2023-03-27 RX ORDER — CEPHALEXIN 500 MG/1
500 CAPSULE ORAL 4 TIMES DAILY
COMMUNITY
End: 2023-03-29

## 2023-03-27 RX ORDER — ASPIRIN 325 MG
325 TABLET ORAL 2 TIMES DAILY
COMMUNITY

## 2023-03-27 NOTE — CARE COORDINATION
[facility] Care Transitions Initial Follow Up Call    Call within 2 business days of discharge: Yes    Patient Current Location:  [place] [address] Transition Nurse contacted the  patient's wife Rand Jimenes, on PHI release,  by telephone to perform post hospital discharge assessment. Verified name and  with patient as identifiers. Provided introduction to self, and explanation of the Care Transition Nurse role. Patient: Keny Spears Patient : 1960   MRN: [id number]  Reason for Admission: Right TKA  Discharge Date: 3/24/23 RARS: No data recorded    Last Discharge  Street       Date Complaint Diagnosis Description Type Department Provider    3/23/23   Admission (Discharged) Raffy Brooks MD            Was this an external facility discharge? No Discharge Facility: [facility]    Challenges to be reviewed by the provider   Additional needs identified to be addressed with provider: Yes    ACE wrap-Pt is s/p R TKA. He reports after his previous L TKA he had significant edema that led to the need to r/o a DVT. He denies any significant edema in R leg currently, but is concerned about the potential for developing edema and wants to know if it's okay to leave the ACE wrap on for now? Potential med allergy-Pt reports generalized itching after taking 2 tabs of Dilaudid, but none after taking 1 tab. He does feel he needs 2 tabs mornings and nights d/t increased pain. Wants to know if it's okay to take Benadryl when taking 2 tabs? Method of communication with provider: chart routing. The patient is doing much better than after the L knee pain replacement. His pain this morning was 5/10, so he took analgesics and is getting ready to do exercises. Taking Tylenol 2 tabs up to BID and ibuprofen 400 mg in between Dilaudid doses. Applying ice to his knee after exercises. He had a BM since surgery and started taking Colace 2 caps last night to prevent constipation.     Care Transition Nurse reviewed

## 2023-03-28 ENCOUNTER — HOSPITAL ENCOUNTER (OUTPATIENT)
Facility: HOSPITAL | Age: 63
Setting detail: RECURRING SERIES
Discharge: HOME | End: 2023-03-31
Payer: COMMERCIAL

## 2023-03-28 PROCEDURE — 97161 PT EVAL LOW COMPLEX 20 MIN: CPT

## 2023-03-28 NOTE — PROGRESS NOTES
0-115 to aid with increase tolerance to RTW demands  EVAL supine E -9 and F 108  4 patient will tolerate TM 6-8 minutes with no increase pain for carryover to work demands  EVAL OOW due to surgery and using Left SC      Frequency / Duration: Patient to be seen 3 times per week for 6 weeks    Patient/ Caregiver education and instruction: Diagnosis, prognosis, self care, activity modification, and exercises [x]  Plan of care has been reviewed with PTA    Certification Period: RONY Kumar, PT       3/28/2023       11:20 AM    Payor: [facility] / Plan: Reza Aquino / Product Type: *No Product type* /     No Physician signature required

## 2023-03-28 NOTE — PROGRESS NOTES
tests/comments:       Pain Level (0-10 scale) post treatment: 5-6    ASSESSMENT/Changes in Function: Patient demonstrates the potential to make gains with improved ROM, strength, endurance/activity tolerance, functional FOTO survey score   and all within a reasonable time frame so as to increase their functional independence with ADLs and activities for carryover to  improve quality of life, increase tolerance to household and community activities and return to work. Patient requires skilled Physical Therapy so as to monitor their response to and modify their treatment plan accordingly. Patient appears to be an appropriate candidate for skilled outpatient Physical Therapy. Patient will continue to benefit from skilled PT services to modify and progress therapeutic interventions, analyze and address functional mobility deficits, analyze and address ROM deficits, analyze and address strength deficits, analyze and address soft tissue restrictions, analyze and cue for proper movement patterns, analyze and modify for postural abnormalities, analyze and address imbalance/dizziness, and instruct in home and community integration to address functional deficits and attain remaining goals.        []  See Plan of Care for goals and reassessment       PLAN  [x]  Upgrade activities as tolerated     [x]  Continue plan of care  []  Update interventions per flow sheet       []  Other:_      Zaida Bowling, PT 3/28/2023  10:49 AM

## 2023-03-29 ENCOUNTER — TELEMEDICINE (OUTPATIENT)
Age: 63
End: 2023-03-29

## 2023-03-29 DIAGNOSIS — Z96.651 STATUS POST RIGHT KNEE REPLACEMENT: Primary | ICD-10-CM

## 2023-03-29 PROCEDURE — 99024 POSTOP FOLLOW-UP VISIT: CPT | Performed by: NURSE PRACTITIONER

## 2023-03-29 RX ORDER — HYDROMORPHONE HYDROCHLORIDE 2 MG/1
2 TABLET ORAL EVERY 4 HOURS PRN
Qty: 30 TABLET | Refills: 0 | Status: SHIPPED | OUTPATIENT
Start: 2023-03-29 | End: 2023-04-06

## 2023-03-29 NOTE — PROGRESS NOTES
Subjective:      Patient presents for postop care following right TKA. Surgery was on 3/23/2023. Ambulating  with a cane . Pain is controlled with current analgesics. Medication(s) being used: Dilaudid. .    Objective: There were no vitals taken for this visit. General:  alert, cooperative, no distress, appears stated age   ROM: -5/110   Incision:   healing well, no drainage, no erythema, incision well approximated, mild swelling     Assessment:     Doing well postoperatively. Plan:     1. Continue PT. 2. Wound care/showering discussed. 3. Continue DVT prophylaxis as directed. 4. Follow up at 1 yr with Dr. Zaida Ziegler for xray's of the right knee and as needed. Kathleen Banks, who was evaluated through a synchronous (real-time) audio-video encounter, and/or his healthcare decision maker, is aware that it is a billable service, with coverage as determined by his insurance carrier. He provided verbal consent to proceed: Yes, and patient identification was verified. It was conducted pursuant to the emergency declaration under the [address] [place] [address], [address] [place] [address] authority and the [employer] Act. A caregiver was present when appropriate. Ability to conduct physical exam was limited. I was in the office. The patient was at home.

## 2023-03-29 NOTE — LETTER
liability or warranty for this information). If you have questions about a medical condition or this instruction, always ask your healthcare professional. [place] [address] any warranty or liability for your use of this information.

## 2023-03-31 ENCOUNTER — HOSPITAL ENCOUNTER (OUTPATIENT)
Facility: HOSPITAL | Age: 63
Setting detail: RECURRING SERIES
End: 2023-03-31
Payer: COMMERCIAL

## 2023-03-31 PROCEDURE — 97110 THERAPEUTIC EXERCISES: CPT

## 2023-03-31 PROCEDURE — 97140 MANUAL THERAPY 1/> REGIONS: CPT

## 2023-03-31 PROCEDURE — 97112 NEUROMUSCULAR REEDUCATION: CPT

## 2023-03-31 NOTE — PROGRESS NOTES
[]redness - adverse reaction:         Therapeutic Procedures: Tx Min Billable or 1:1 Min (if diff from Tx Min) Procedure, Rationale, Specifics   22  71376 Therapeutic Exercise (timed):  increase ROM, strength, coordination, balance, and proprioception to improve patient's ability to progress to PLOF and address remaining functional goals. (see flow sheet as applicable)     Details if applicable:       16  07849 Neuromuscular Re-Education (timed):  improve balance, coordination, kinesthetic sense, posture, core stability and proprioception to improve patient's ability to develop conscious control of individual muscles and awareness of position of extremities in order to progress to PLOF and address remaining functional goals. (see flow sheet as applicable)     Details if applicable:     10  18364 Manual Therapy (timed):  decrease pain, increase ROM, increase tissue extensibility, decrease edema, and decrease trigger points to improve patient's ability to progress to PLOF and address remaining functional goals. The manual therapy interventions were performed at a separate and distinct time from the therapeutic activities interventions . (see flow sheet as applicable)     Details if applicable:            Details if applicable:            Details if applicable:     50  [facility] Totals Reminder: bill using total billable min of TIMED therapeutic procedures (example: do not include dry needle or estim unattended, both untimed codes, in totals to left)  8-22 min = 1 unit; 23-37 min = 2 units; 38-52 min = 3 units; 53-67 min = 4 units; 68-82 min = 5 units   Total Total     [x]  Patient Education billed concurrently with other procedures   [x] Review HEP    [] Progressed/Changed HEP, detail:    [] Other detail:       Objective Information/Functional Measures/Assessment  Right knee ROM: -2- 0 degrees; 103 degrees    Patient is progressing as expected. Initiated exercises set a initial evaluation.  Verbal cues were Carlos Ruvalcaba, PTA    3/31/2023    8:52 AM    Future Appointments   Date Time Provider Jaimie Dunbar   3/31/2023  3:30 PM Daniel Wilkinson [place] [facility]   4/3/2023  4:30 PM Daniel Wilkinson, [place] [facility]   4/5/2023  5:00 PM Daniel Wilkinson, PTA [facility]   4/7/2023 11:30 AM Daniel Wilkinson, PTA [facility]   4/10/2023 11:30 AM Daniel Wilkinson, PTA [facility]   4/12/2023 11:30 AM Daniel Wilkinson, PTA [facility]   4/14/2023 11:30 AM Daniel Wilkinson, PTA [facility]   4/17/2023 11:30 AM Poncho Denilson, PT [facility]   4/19/2023 11:30 AM Poncho Denilson, PT [facility]   4/21/2023 11:30 AM Valere Halon, PTA [facility]   4/24/2023 11:30 AM Poncho Denilson, PT [facility]   4/26/2023 11:30 AM Poncho Denilson, PT [facility]   4/28/2023 11:30 AM Valere Halon, PTA [facility]   7/13/2023  8:35 AM [facility] LAB VISIT [facility] BS AMB   8/1/2023  8:00 AM Jonelle Jones MD [facility] BS AMB

## 2023-04-02 DIAGNOSIS — F41.9 ANXIETY: ICD-10-CM

## 2023-04-03 ENCOUNTER — HOSPITAL ENCOUNTER (OUTPATIENT)
Facility: HOSPITAL | Age: 63
Setting detail: RECURRING SERIES
Discharge: HOME | End: 2023-04-06
Payer: COMMERCIAL

## 2023-04-03 ENCOUNTER — CARE COORDINATION (OUTPATIENT)
Facility: CLINIC | Age: 63
End: 2023-04-03

## 2023-04-03 PROCEDURE — 97110 THERAPEUTIC EXERCISES: CPT

## 2023-04-03 PROCEDURE — 97140 MANUAL THERAPY 1/> REGIONS: CPT

## 2023-04-03 PROCEDURE — 97530 THERAPEUTIC ACTIVITIES: CPT

## 2023-04-03 NOTE — PROGRESS NOTES
tolerate TM 6-8 minutes with no increase pain for carryover to work demands  EVAL OOW due to surgery and using Left SC       PLAN  Yes  Continue plan of care  []  Upgrade activities as tolerated  []  Discharge due to :  []  Other:    Berta Matt, PTA    4/3/2023    9:57 AM    Future Appointments   Date Time Provider Jaimie Bettina   4/3/2023  4:30 PM Berta Matt, PTA [facility]   4/5/2023  5:00 PM Berta Matt, PTA [facility]   4/7/2023 11:30 AM Berta Matt, PTA [facility]   4/10/2023 11:30 AM Berta Matt, PTA [facility]   4/12/2023 11:30 AM Berta Matt, PTA [facility]   4/14/2023 11:30 AM Berta Matt, PTA [facility]   4/17/2023 11:30 AM Romi Pompa, PT [facility]   4/19/2023 11:30 AM Romi Pompa, PT [facility]   4/21/2023 11:30 AM Urban Salina, PTA [facility]   4/24/2023 11:30 AM Romi Pompa, PT [facility]   4/26/2023 11:30 AM Romi Pompa, PT [facility]   4/28/2023 11:30 AM Urban Salina, PTA [facility]   7/13/2023  8:35 AM [facility] LAB VISIT [facility] BS AMB   8/1/2023  8:00 AM Bang Tobar MD [facility] BS AMB

## 2023-04-03 NOTE — CARE COORDINATION
Patient-reported symptoms: In the past 7 days  Patient-reported symptoms: Pain  Interventions for patient-reported symptoms: Notified PCP/Physician  Have your medications changed?: No  Do you have any questions related to your medications?: No  Do you currently have any active services?: Yes  Are you currently active with any services?: Outpatient/Community Services  Do you have any needs or concerns that I can assist you with?: No  Identified Barriers: None  Care Transitions Interventions  Other Interventions:             Care Transition Nurse provided contact information for future needs. Plan for follow-up call in 7-10 days based on severity of symptoms and risk factors.   Plan for next call: symptom management-assess pain management and for post op red flags    Tim Rodriguez RN

## 2023-04-05 ENCOUNTER — HOSPITAL ENCOUNTER (OUTPATIENT)
Facility: HOSPITAL | Age: 63
Setting detail: RECURRING SERIES
Discharge: HOME | End: 2023-04-08
Payer: COMMERCIAL

## 2023-04-05 PROCEDURE — 97530 THERAPEUTIC ACTIVITIES: CPT

## 2023-04-05 PROCEDURE — 97110 THERAPEUTIC EXERCISES: CPT

## 2023-04-05 PROCEDURE — 97140 MANUAL THERAPY 1/> REGIONS: CPT

## 2023-04-05 NOTE — PROGRESS NOTES
4/5/2023    4 patient will tolerate TM 6-8 minutes with no increase pain for carryover to work demands  EVAL OOW due to surgery and using Left SC      PLAN  Yes  Continue plan of care  []  Upgrade activities as tolerated  []  Discharge due to :  []  Other:    Jody Dover, ASIA    4/5/2023    2:18 PM    Future Appointments   Date Time Provider Jaimie Bettina   4/5/2023  5:00 PM Jody Dover, PTA [facility]   4/7/2023 11:30 AM Jody Dover, PTA [facility]   4/10/2023 11:30 AM Jody Dover, PTA [facility]   4/12/2023 11:30 AM Jody Dover, PTA [facility]   4/14/2023 11:30 AM Jody Dover, PTA [facility]   4/17/2023 11:30 AM Guanako Ash, PT [facility]   4/19/2023 11:30 AM Guanako Ash, PT [facility]   4/21/2023 11:30 AM Gerry Ariza, PTA [facility]   4/24/2023 11:30 AM Guanako Ash, PT [facility]   4/26/2023 11:30 AM Guanako Ash, PT [facility]   4/28/2023 11:30 AM Gerry Ariza, PTA [facility]   7/13/2023  8:35 AM [facility] LAB VISIT IO BS AMB   8/1/2023  8:00 AM Wilma Sosa MD [facility] BS AMB

## 2023-04-06 RX ORDER — DIAZEPAM 10 MG/1
5 TABLET ORAL EVERY 8 HOURS PRN
Qty: 45 TABLET | Refills: 0 | Status: SHIPPED | OUTPATIENT
Start: 2023-04-06 | End: 2023-05-30

## 2023-04-07 ENCOUNTER — TELEPHONE (OUTPATIENT)
Age: 63
End: 2023-04-07

## 2023-04-07 DIAGNOSIS — Z96.651 STATUS POST RIGHT KNEE REPLACEMENT: Primary | ICD-10-CM

## 2023-04-07 RX ORDER — OXYCODONE HYDROCHLORIDE AND ACETAMINOPHEN 5; 325 MG/1; MG/1
1 TABLET ORAL
Qty: 30 TABLET | Refills: 0 | Status: SHIPPED | OUTPATIENT
Start: 2023-04-07 | End: 2023-04-15

## 2023-04-07 NOTE — TELEPHONE ENCOUNTER
Patient TKA the later part of March. He needs a refill on pain medication. This will be his first refill for this knee.    Please send to [facility]

## 2023-04-14 ENCOUNTER — HOSPITAL ENCOUNTER (OUTPATIENT)
Facility: HOSPITAL | Age: 63
Setting detail: RECURRING SERIES
Discharge: HOME | End: 2023-04-17
Payer: COMMERCIAL

## 2023-04-14 PROCEDURE — 97110 THERAPEUTIC EXERCISES: CPT

## 2023-04-14 PROCEDURE — 97140 MANUAL THERAPY 1/> REGIONS: CPT

## 2023-04-14 PROCEDURE — 97530 THERAPEUTIC ACTIVITIES: CPT

## 2023-04-17 ENCOUNTER — HOSPITAL ENCOUNTER (OUTPATIENT)
Facility: HOSPITAL | Age: 63
Setting detail: RECURRING SERIES
Discharge: HOME | End: 2023-04-20
Payer: COMMERCIAL

## 2023-04-17 ENCOUNTER — CARE COORDINATION (OUTPATIENT)
Facility: CLINIC | Age: 63
End: 2023-04-17

## 2023-04-17 PROCEDURE — 97530 THERAPEUTIC ACTIVITIES: CPT

## 2023-04-17 PROCEDURE — 97110 THERAPEUTIC EXERCISES: CPT

## 2023-04-17 PROCEDURE — 97140 MANUAL THERAPY 1/> REGIONS: CPT

## 2023-04-17 RX ORDER — OXYCODONE HYDROCHLORIDE AND ACETAMINOPHEN 5; 325 MG/1; MG/1
1 TABLET ORAL
COMMUNITY

## 2023-04-17 NOTE — PROGRESS NOTES
122 deg, 04/17/23    4 patient will tolerate TM 6-8 minutes with no increase pain for carryover to work demands  EVAL OOW due to surgery and using Left SC  Current: Not initiated.  4/14/2023    PLAN  Yes  Continue plan of care  [x]  Upgrade activities as tolerated  []  Discharge due to :  []  Other:    Chyrl Robin, PT    4/17/2023    11:41 AM    Future Appointments   Date Time Provider Jaimie Dunbar   4/19/2023 11:30 AM Chyrl Robin, PT [facility]   4/21/2023 11:30 AM Jeannie Ambriz, PTA [facility]   4/24/2023 11:30 AM Chyrl Robin, PT [facility]   4/26/2023 11:30 AM Chyrl Miracleely, PT [facility]   4/28/2023 11:30 AM Jeannie Ambriz, PTA [facility]   7/13/2023  8:35 AM [facility] LAB VISIT IO BS AMB   8/1/2023  8:00 AM Ashutosh Ruggiero MD IO BS AMB

## 2023-04-17 NOTE — CARE COORDINATION
[facility] Care Transitions Follow Up Call    Patient Current Location:  [facility] Transition Nurse contacted the patient by telephone to follow up after admission on 3/23/23. Verified name and  with patient as identifiers. Patient: Mirian Butler  Patient : 1960   MRN: [id number]  Reason for Admission: Right TKA  Discharge Date: 3/24/23 RARS: No data recorded    Needs to be reviewed by the provider   Additional needs identified to be addressed with provider: No  none             Method of communication with provider: none. Patient reports pain level was 3/10 just prior to PT, then went down to 0/10 during PT, and is currently 2/10. He continues to have difficulty sleeping at night, but the past two nights he slept better. He finished Dilaudid and started Percocet yesterday which was very effective at reducing his pain in a timely manner. He resumed Valium at bedtime and spaced apart his Percocet dose by about 5 hours. He went to bed around 11 pm, woke up at 5:30 am and took 3 Tylenol, then went back to sleep for about 4 hours. He had burning stomach pain that turned into queasiness during PT today. He typically takes Gaviscon nightly and took a dose last night. He took Percocet last night on a full stomach and ate breakfast this morning. He still has Zofran and will consider taking this next time has has stomach pain. Addressed changes since last contact:  medications-started Percocet yesterday  Was follow up appointment scheduled within 7 days from discharge? Yes  Did patient attend follow up appointment?  Yes, completed telemedicine visit with surgeon associate 3/29/23    Follow Up  Future Appointments   Date Time Provider Jaimie Dunbar   2023 11:30 AM [facility], PT [facility]   2023 11:30 AM Yunior Bell PTA [facility]   2023 11:30 AM [facility], PT [facility]   2023 11:30 AM [facility], [facility]   2023 11:30 AM Yunior Fendt, PTA [facility]

## 2023-04-19 ENCOUNTER — HOSPITAL ENCOUNTER (OUTPATIENT)
Facility: HOSPITAL | Age: 63
Setting detail: RECURRING SERIES
Discharge: HOME | End: 2023-04-22
Payer: COMMERCIAL

## 2023-04-19 PROCEDURE — 97140 MANUAL THERAPY 1/> REGIONS: CPT

## 2023-04-19 PROCEDURE — 97535 SELF CARE MNGMENT TRAINING: CPT

## 2023-04-19 PROCEDURE — 97110 THERAPEUTIC EXERCISES: CPT

## 2023-04-20 DIAGNOSIS — Z96.651 STATUS POST RIGHT KNEE REPLACEMENT: Primary | ICD-10-CM

## 2023-04-20 RX ORDER — HYDROCODONE BITARTRATE AND ACETAMINOPHEN 5; 325 MG/1; MG/1
1 TABLET ORAL EVERY 6 HOURS PRN
Qty: 28 TABLET | Refills: 0 | Status: SHIPPED | OUTPATIENT
Start: 2023-04-20 | End: 2023-04-27

## 2023-04-21 ENCOUNTER — APPOINTMENT (OUTPATIENT)
Facility: HOSPITAL | Age: 63
End: 2023-04-21
Payer: COMMERCIAL

## 2023-04-22 NOTE — PROGRESS NOTES
[facility] PHYSICAL THERAPY  [address] [place] [address], [address] [place] [address] 434,[facility] [[email or web address]] 300  KR:[phone] EQ:[phone]  PHYSICAL THERAPY PROGRESS NOTE  Patient Name: Betty Iabrra : 1960   Treatment/Medical Diagnosis: Right knee pain [M25.561]   Referral Source: Crow Benitez MD     Date of Initial Visit: 2023 Attended Visits: 10 Missed Visits: 0     CURRENT STATUS  Pt reports a 75% improvement in current condition since beginning skilled PT services. Notes improvements in mobility/flexibility and walking w/ greater independence, however continues to experience increased pain levels at night time (effecting sleep quality), in addition to difficulties performing stair navigation tasks w/ greatest efficiency/ind and walking prolonged durations. States he would like to continue participating in PT order to target remaining limitations noted above. Objectively, pt demonstrates improvements toward all LTGs, w/ meeting AROM goal set at IE. Discussed reducing frequency of in person sessions to continue emphasizing/targeting remaining functional limitations/deficits and appropriately prepare for future d/c - pt in agreement. Patient will continue to benefit from skilled PT services to modify and progress therapeutic interventions, analyze and address functional mobility deficits, analyze and address ROM deficits, analyze and address strength deficits, analyze and address soft tissue restrictions, analyze and cue for proper movement patterns, analyze and modify for postural abnormalities, analyze and address imbalance/dizziness, and instruct in home and community integration to address functional deficits and attain remaining goals. Progress toward goals:  Short Term Goals:  To be accomplished in 4 treatments  1 patient will have established and be I with HEP to aid with independence and self management at discharge  EVAL HEP issued at evaluation  Current: Met:
[facility]   4/28/2023 11:30 AM Aurora Joe, PTA [facility]   7/13/2023  8:35 AM [facility] LAB VISIT [facility] AMB   8/1/2023  8:00 AM Adan Bentley MD IO BS AMB

## 2023-04-24 ENCOUNTER — HOSPITAL ENCOUNTER (OUTPATIENT)
Facility: HOSPITAL | Age: 63
Setting detail: RECURRING SERIES
Discharge: HOME | End: 2023-04-27
Payer: COMMERCIAL

## 2023-04-24 ENCOUNTER — CARE COORDINATION (OUTPATIENT)
Facility: CLINIC | Age: 63
End: 2023-04-24

## 2023-04-24 PROCEDURE — 97530 THERAPEUTIC ACTIVITIES: CPT

## 2023-04-24 PROCEDURE — 97112 NEUROMUSCULAR REEDUCATION: CPT

## 2023-04-24 PROCEDURE — 97110 THERAPEUTIC EXERCISES: CPT

## 2023-04-24 NOTE — CARE COORDINATION
[facility] Care Transitions Follow Up Call    Patient Current Location:  [facility] Transition Nurse contacted the patient by telephone to follow up after admission on 3/23/23. Verified name and  with patient as identifiers. Patient: Annamarie Siddiqui  Patient : 1960   MRN: [id number]  Reason for Admission: Right TKA  Discharge Date: 3/24/23 RARS: No data recorded    Needs to be reviewed by the provider   Additional needs identified to be addressed with provider: No  none             Method of communication with provider: none. Patient reports he's ambulating more easily and pain is tolerable. He was unable to tolerate Percocet d/t nausea, so he was switched to [address] which is effective and tolerating well. Last night he didn't sleep well, but slept better the night prior. Pain woke him up at a level 4-5/10, so he took Tylenol and couldn't fall back asleep. Addressed changes since last contact:  medications-Percocet switched to [address] [facility]  Was follow up appointment scheduled within 7 days from discharge? Yes  Did patient attend follow up appointment? Yes, completed telemedicine visit with surgeon associate 3/29/23    Follow Up  Future Appointments   Date Time Provider Jaimie Dunbar   2023 11:30 AM Ander Grubbs PT [facility]   5/3/2023 11:30 AM Marilu Islas PTA [facility]   2023 11:30 AM Marilu Islas PTA [facility]   2023  8:35 AM IO LAB VISIT IO BS AMB   2023  8:00 AM Irma Burgos MD IO BS AMB     Non-BS follow up appointment(s): none    Care Transition Nurse reviewed medical action plan with patient and discussed any barriers to care and/or understanding of plan of care after discharge. Discussed appropriate site of care based on symptoms and resources available to patient including: Specialist. The patient agrees to contact the PCP office for questions related to their healthcare.      Advance Care Planning:   not on file, patient will consider completing an

## 2023-04-24 NOTE — PROGRESS NOTES
PHYSICAL / OCCUPATIONAL THERAPY - DAILY TREATMENT NOTE (updated )    Patient Name: Sharon Talbert    Date: 2023    : 1960  Insurance: Payor: Mark Gonzalez / Plan: Kelli Guthrie / Product Type: *No Product type* /      Patient  verified Yes     Visit #   Current / Total 1 10   Time   In / Out 11:30A 12:22P   Pain   In / Out 0 0   Subjective Functional Status/Changes: Patient reports some improvements in right knee pain levels at night, further stating \"I had two good nights sleeping over the weekend but then last night was a bad one\"    Changes to:  Meds, Allergies, Med Hx, Sx Hx? If yes, update Summary List no       TREATMENT AREA =  Right knee pain [M25.561]    OBJECTIVE    Modalities Rationale:     decrease inflammation, decrease pain, and reduce DOMS  to improve patient's ability to progress to PLOF and address remaining functional goals. min [] Estim Unattended, type/location:                                      []  w/ice    []  w/heat    min [] Estim Attended, type/location:                                     []  w/US     []  w/ice    []  w/heat    []  TENS insruct      min []  Mechanical Traction: type/lbs                   []  pro   []  sup   []  int   []  cont    []  before manual    []  after manual    min []  Ultrasound, settings/location:      min []  Iontophoresis w/ dexamethasone, location:                                               []  take home patch       []  in clinic   10 min  unbill [x]  Ice     []  Heat    location/position: Right Knee/ Reclined     min []  Paraffin,  details:     min []  Vasopneumatic Device, press/temp:     min []  Mejia Shallow / Felisha Melvin:     If using vaso (only need to measure limb vaso being performed on)      pre-treatment girth :       post-treatment girth :       measured at (landmark location) :      min []  Other:    Skin assessment post-treatment (if applicable):    [x]  intact    [x]  redness- no adverse reaction                 []redness -

## 2023-04-26 ENCOUNTER — HOSPITAL ENCOUNTER (OUTPATIENT)
Facility: HOSPITAL | Age: 63
Setting detail: RECURRING SERIES
Discharge: HOME | End: 2023-04-29
Payer: COMMERCIAL

## 2023-04-26 PROCEDURE — 97530 THERAPEUTIC ACTIVITIES: CPT

## 2023-04-26 PROCEDURE — 97110 THERAPEUTIC EXERCISES: CPT

## 2023-04-26 PROCEDURE — 97140 MANUAL THERAPY 1/> REGIONS: CPT

## 2023-04-26 NOTE — PROGRESS NOTES
PHYSICAL / OCCUPATIONAL THERAPY - DAILY TREATMENT NOTE (updated )    Patient Name: Yvonne Abdi    Date: 2023    : 1960  Insurance: Payor: Yael Sparks / Plan: Sabine Castillo / Product Type: *No Product type* /      Patient  verified Yes     Visit #   Current / Total 2 10   Time   In / Out 11:30A 12:23P   Pain   In / Out 0 0   Subjective Functional Status/Changes: Patient reports continued improvements in overall functional status and sleeping tolerance, further stating \"I woke up in the middle of the night last night and couldn't fall back asleep but it wasn't because of knee pain\"   Changes to:  Meds, Allergies, Med Hx, Sx Hx? If yes, update Summary List no       TREATMENT AREA =  Right knee pain [M25.561]    OBJECTIVE    Modalities Rationale:     decrease inflammation, decrease pain, and reduce DOMS  to improve patient's ability to progress to PLOF and address remaining functional goals. min [] Estim Unattended, type/location:                                      []  w/ice    []  w/heat    min [] Estim Attended, type/location:                                     []  w/US     []  w/ice    []  w/heat    []  TENS insruct      min []  Mechanical Traction: type/lbs                   []  pro   []  sup   []  int   []  cont    []  before manual    []  after manual    min []  Ultrasound, settings/location:      min []  Iontophoresis w/ dexamethasone, location:                                               []  take home patch       []  in clinic   10 min  unbill [x]  Ice     []  Heat    location/position: Right Knee/ Reclined     min []  Paraffin,  details:     min []  Vasopneumatic Device, press/temp:     min []  Tia Bellamy / Jessica Rivas:     If using vaso (only need to measure limb vaso being performed on)      pre-treatment girth :       post-treatment girth :       measured at (landmark location) :      min []  Other:    Skin assessment post-treatment (if applicable):    [x]  intact    [x]

## 2023-04-28 ENCOUNTER — APPOINTMENT (OUTPATIENT)
Facility: HOSPITAL | Age: 63
End: 2023-04-28
Payer: COMMERCIAL

## 2023-05-03 ENCOUNTER — HOSPITAL ENCOUNTER (OUTPATIENT)
Facility: HOSPITAL | Age: 63
Setting detail: RECURRING SERIES
Discharge: HOME | End: 2023-05-06
Payer: COMMERCIAL

## 2023-05-03 PROCEDURE — 97140 MANUAL THERAPY 1/> REGIONS: CPT

## 2023-05-03 PROCEDURE — 97530 THERAPEUTIC ACTIVITIES: CPT

## 2023-05-03 PROCEDURE — 97110 THERAPEUTIC EXERCISES: CPT

## 2023-05-03 NOTE — PROGRESS NOTES
to PT with no increase in right knee pain and is progressing well. Patient performed exercises, as per flow sheet, to assist with increasing right knee strength and stability. Verbal cues were required for proper form with eccentric step downs. Patient reported increased right knee stiffness during today's session but no increase in pain was reported at the end of today's session. Will continue to progress patient as tolerated and able. Patient will continue to benefit from skilled PT / OT services to modify and progress therapeutic interventions, analyze and address functional mobility deficits, analyze and address ROM deficits, analyze and address strength deficits, analyze and address soft tissue restrictions, analyze and cue for proper movement patterns, analyze and address imbalance/dizziness, and instruct in home and community integration to address functional deficits and attain remaining goals. Progress toward goals / Updated goals:  []  See Progress Note/Recertification    1 patient will have pain < 2/10 to aid with increase tolerance to ADLS and regular daily activities at home and in the community  PN: 2-8/10. 5/3/2023  Current: Patient reports 0/10 today    2 patient will have FOTO improved to projected gains of 55 to show significant improved gains with function at home and for work  PN:  Progressin pts, 23  Current: ongoing, will reassess at future session. 5/3/23     3 patient will have AROM 0-120 to aid with increase tolerance to RTW demands  PN: Progressing: Right Knee AROM: (pre MT): -3 - 117 deg, 23  Current: Right Knee AROM: Extension: -1- 0 degs; Flexion: 123 deg. 5/3/2023    4 patient will tolerate TM 6-8 minutes with no increase pain for carryover to work demands  PN: Progressing: Not initiated in clinic, however pt reports walking around 400 yd yesterday  Current: Progressing: Initiated TM (5 min) during today's session, no increase in pain.  5/3/23    PLAN  Yes

## 2023-05-05 ENCOUNTER — HOSPITAL ENCOUNTER (OUTPATIENT)
Facility: HOSPITAL | Age: 63
Setting detail: RECURRING SERIES
Discharge: HOME | End: 2023-05-08
Payer: COMMERCIAL

## 2023-05-05 PROCEDURE — 97530 THERAPEUTIC ACTIVITIES: CPT

## 2023-05-05 PROCEDURE — 97110 THERAPEUTIC EXERCISES: CPT

## 2023-05-05 PROCEDURE — 97140 MANUAL THERAPY 1/> REGIONS: CPT

## 2023-05-08 ENCOUNTER — HOSPITAL ENCOUNTER (OUTPATIENT)
Facility: HOSPITAL | Age: 63
Setting detail: RECURRING SERIES
Discharge: HOME | End: 2023-05-11
Payer: COMMERCIAL

## 2023-05-08 PROCEDURE — 97140 MANUAL THERAPY 1/> REGIONS: CPT

## 2023-05-08 PROCEDURE — 97110 THERAPEUTIC EXERCISES: CPT

## 2023-05-08 PROCEDURE — 97530 THERAPEUTIC ACTIVITIES: CPT

## 2023-05-08 NOTE — PROGRESS NOTES
PHYSICAL / OCCUPATIONAL THERAPY - DAILY TREATMENT NOTE (updated )    Patient Name: Fernandez Jiménez    Date: 2023    : 1960  Insurance: Payor: Gisselle Edmonds 150 / Plan: Cherylene Mai / Product Type: *No Product type* /      Patient  verified Yes     Visit #   Current / Total 5 10   Time   In / Out 1049 am  1148 am    Pain   In / Out 0/10  0/10    Subjective Functional Status/Changes: Patient reports that his knee is really stiff today. Changes to:  Meds, Allergies, Med Hx, Sx Hx? If yes, update Summary List no       TREATMENT AREA =  Right knee pain [M25.561]    OBJECTIVE    Modalities Rationale:     decrease edema, decrease inflammation, decrease pain, and increase tissue extensibility to improve patient's ability to progress to PLOF and address remaining functional goals. min [] Estim Unattended, type/location:                                      []  w/ice    []  w/heat    min [] Estim Attended, type/location:                                     []  w/US     []  w/ice    []  w/heat    []  TENS insruct      min []  Mechanical Traction: type/lbs                   []  pro   []  sup   []  int   []  cont    []  before manual    []  after manual    min []  Ultrasound, settings/location:      min []  Iontophoresis w/ dexamethasone, location:                                               []  take home patch       []  in clinic   10 min  unbill [x]  Ice-ant. knee     [x]  Heat- post. Knee  location/position: To right knee with patient in reclined position    min []  Paraffin,  details:     min []  Vasopneumatic Device, press/temp:     min []  Venetta Greer / Dover Luster:     If using vaso (only need to measure limb vaso being performed on)      pre-treatment girth :       post-treatment girth :       measured at (landmark location) :      min []  Other:    Skin assessment post-treatment (if applicable):    []  intact    []  redness- no adverse reaction                 []redness - adverse reaction:

## 2023-05-10 ENCOUNTER — TELEPHONE (OUTPATIENT)
Facility: HOSPITAL | Age: 63
End: 2023-05-10

## 2023-05-10 ENCOUNTER — APPOINTMENT (OUTPATIENT)
Facility: HOSPITAL | Age: 63
End: 2023-05-10
Payer: COMMERCIAL

## 2023-05-12 ENCOUNTER — TELEPHONE (OUTPATIENT)
Age: 63
End: 2023-05-12

## 2023-05-12 DIAGNOSIS — Z96.651 STATUS POST RIGHT KNEE REPLACEMENT: Primary | ICD-10-CM

## 2023-05-12 RX ORDER — TRAMADOL HYDROCHLORIDE 50 MG/1
50 TABLET ORAL EVERY 8 HOURS PRN
Qty: 30 TABLET | Refills: 0 | Status: SHIPPED | OUTPATIENT
Start: 2023-05-12 | End: 2023-05-22

## 2023-05-12 NOTE — TELEPHONE ENCOUNTER
Rt tkr was 3/23/23.     He is requesting a refill of [place].    Pharmacy: [address] [facility], [address] [place] [phone] -

## 2023-05-15 ENCOUNTER — APPOINTMENT (OUTPATIENT)
Facility: HOSPITAL | Age: 63
End: 2023-05-15
Payer: COMMERCIAL

## 2023-05-19 ENCOUNTER — OFFICE VISIT (OUTPATIENT)
Age: 63
End: 2023-05-19

## 2023-05-19 DIAGNOSIS — M25.562 LEFT KNEE PAIN, UNSPECIFIED CHRONICITY: Primary | ICD-10-CM

## 2023-05-19 PROBLEM — M17.0 PRIMARY OSTEOARTHRITIS OF BOTH KNEES: Status: ACTIVE | Noted: 2017-10-18

## 2023-05-19 PROBLEM — Q21.12 PFO (PATENT FORAMEN OVALE): Status: ACTIVE | Noted: 2018-01-12

## 2023-05-19 PROBLEM — E78.00 PURE HYPERCHOLESTEROLEMIA: Status: ACTIVE | Noted: 2018-01-12

## 2023-05-19 PROBLEM — M17.12 PRIMARY OSTEOARTHRITIS OF LEFT KNEE: Status: ACTIVE | Noted: 2018-01-23

## 2023-05-19 PROBLEM — M25.569 KNEE PAIN: Status: ACTIVE | Noted: 2017-10-18

## 2023-05-19 PROBLEM — R60.0 LOCALIZED EDEMA: Status: ACTIVE | Noted: 2018-01-12

## 2023-05-19 PROBLEM — J32.9 CHRONIC SINUSITIS: Status: ACTIVE | Noted: 2018-01-12

## 2023-05-19 PROCEDURE — 99024 POSTOP FOLLOW-UP VISIT: CPT | Performed by: ORTHOPAEDIC SURGERY

## 2023-05-19 RX ORDER — CLINDAMYCIN HYDROCHLORIDE 300 MG/1
300 CAPSULE ORAL EVERY 6 HOURS
Qty: 28 CAPSULE | Refills: 0 | Status: SHIPPED | OUTPATIENT
Start: 2023-05-19 | End: 2023-05-26

## 2023-05-26 ENCOUNTER — HOSPITAL ENCOUNTER (OUTPATIENT)
Facility: HOSPITAL | Age: 63
Setting detail: RECURRING SERIES
Discharge: HOME | End: 2023-05-29
Payer: COMMERCIAL

## 2023-05-26 PROCEDURE — 97140 MANUAL THERAPY 1/> REGIONS: CPT

## 2023-05-26 PROCEDURE — 97530 THERAPEUTIC ACTIVITIES: CPT

## 2023-05-26 PROCEDURE — 97110 THERAPEUTIC EXERCISES: CPT

## 2023-05-28 DIAGNOSIS — F41.9 ANXIETY: ICD-10-CM

## 2023-05-30 RX ORDER — DIAZEPAM 10 MG/1
TABLET ORAL
Qty: 45 TABLET | Refills: 0 | Status: SHIPPED | OUTPATIENT
Start: 2023-05-30 | End: 2023-06-30

## 2023-05-30 NOTE — TELEPHONE ENCOUNTER
PCP: Silvio Tellez MD    Last appt: [unfilled]  Future Appointments   Date Time Provider Jaimie Dunbar   7/13/2023  8:35 AM IO LAB VISIT [facility]   8/1/2023  8:00 AM Berta Officer, MD [facility]       Requested Prescriptions     Pending Prescriptions Disp Refills    diazePAM (VALIUM) 10 MG tablet [Pharmacy Med Name: diazePAM 10 MG Oral Tablet] 45 tablet 0     Sig: TAKE 1/2 (ONE-HALF) TABLET BY MOUTH EVERY 8 HOURS AS NEEDED FOR ANXIETY FOR UP TO 30 DAYS.  MAX DAILY AMOUNT: 15 MG     [facility]  report reviewed      The last fill date was 04/08/2023 for a 30 d/s qty 45        Last UDS: 12-28-22     CSA Last signed: 12-29-22

## 2023-06-02 NOTE — PROGRESS NOTES
[address] [place] [address] PHYSICAL THERAPY  [address] [place] [address], [address] [place] [address],[facility] [address]  RN:[phone] :[phone]  DISCHARGE SUMMARY  Patient Name: Fernandez Jiménez : 1960   Treatment/Medical Diagnosis: Right knee pain [M25.561]   Referral Source: Luann Osler, MD     Date of Initial Visit: 3/28/2023 Attended Visits: 16 Missed Visits: 0       CURRENT STATUS  Patient has attended all authorized PT appointments at this time and has regressed in progress since the last assessment was performed. An assessment was performed to request additional PT visits. Patient's insurance provider denied additional PT visits due to not deeming it necessary. Patient to be discharged at this time. 1 patient will have pain < 2/10 to aid with increase tolerance to ADLS and regular daily activities at home and in the community  PN: 2-8/10. Current: Patient reports 2/10 pain today and 7-8/10 pain with ADL's. Progressing      2 patient will have FOTO improved to projected gains of 55 to show significant improved gains with function at home and for work  PN:  Progressin pts,   Current: 36 points. Regressed      3 patient will have AROM 0-120 to aid with increase tolerance to RTW demands  PN: Progressing: Right Knee AROM: (pre MT): -3 - 117 deg,   Current: Right Knee AROM: -3- 118 deg. Regressed     4 patient will tolerate TM 6-8 minutes with no increase pain for carryover to work demands  PN: Progressing: Not initiated in clinic, however pt reports walking around 400 yd yesterday  Current: Progressing: Patient tolerated TM for 7 minutes with no increase in right knee pain. MET       RECOMMENDATIONS  Other: Discharge due to insurance provider not deeming PT necessary. If you have any questions/comments please contact us directly at [phone]. Thank you for allowing us to assist in the care of your patient.     Ifeoma Foster, PTA       2023       12:40 PM

## 2023-06-09 ENCOUNTER — TELEMEDICINE (OUTPATIENT)
Age: 63
End: 2023-06-09

## 2023-06-09 DIAGNOSIS — Z96.652 STATUS POST LEFT KNEE REPLACEMENT: Primary | ICD-10-CM

## 2023-06-09 PROCEDURE — 99024 POSTOP FOLLOW-UP VISIT: CPT | Performed by: NURSE PRACTITIONER

## 2023-06-09 NOTE — PROGRESS NOTES
conducted with patient's (and/or legal guardian's) consent. Patient identification was verified, and a caregiver was present when appropriate.    The patient was located at Home: [address] [place] [address]  [phone]  Provider was located at [facility] (Appt Dept): [address] [place] [address] Dr COOPER, [address] [place] [address]

## 2023-06-15 ENCOUNTER — HOSPITAL ENCOUNTER (OUTPATIENT)
Age: 63
Setting detail: OUTPATIENT SURGERY
Discharge: HOME | End: 2023-06-15
Attending: ORTHOPAEDIC SURGERY | Admitting: ORTHOPAEDIC SURGERY
Payer: COMMERCIAL

## 2023-06-15 VITALS
DIASTOLIC BLOOD PRESSURE: 88 MMHG | WEIGHT: 243 LBS | HEART RATE: 64 BPM | BODY MASS INDEX: 32.96 KG/M2 | OXYGEN SATURATION: 99 % | SYSTOLIC BLOOD PRESSURE: 142 MMHG | TEMPERATURE: 97.8 F | RESPIRATION RATE: 16 BRPM

## 2023-06-15 PROCEDURE — 7100000010 HC PHASE II RECOVERY - FIRST 15 MIN: Performed by: ORTHOPAEDIC SURGERY

## 2023-06-15 PROCEDURE — 2709999900 HC NON-CHARGEABLE SUPPLY: Performed by: ORTHOPAEDIC SURGERY

## 2023-06-15 PROCEDURE — 6370000000 HC RX 637 (ALT 250 FOR IP): Performed by: NURSE ANESTHETIST, CERTIFIED REGISTERED

## 2023-06-15 PROCEDURE — 3600000015 HC SURGERY LEVEL 5 ADDTL 15MIN: Performed by: ORTHOPAEDIC SURGERY

## 2023-06-15 PROCEDURE — 2580000003 HC RX 258: Performed by: NURSE ANESTHETIST, CERTIFIED REGISTERED

## 2023-06-15 PROCEDURE — 7100000011 HC PHASE II RECOVERY - ADDTL 15 MIN: Performed by: ORTHOPAEDIC SURGERY

## 2023-06-15 PROCEDURE — 3600000005 HC SURGERY LEVEL 5 BASE: Performed by: ORTHOPAEDIC SURGERY

## 2023-06-15 PROCEDURE — 2500000003 HC RX 250 WO HCPCS: Performed by: ORTHOPAEDIC SURGERY

## 2023-06-15 PROCEDURE — 3700000001 HC ADD 15 MINUTES (ANESTHESIA): Performed by: ORTHOPAEDIC SURGERY

## 2023-06-15 PROCEDURE — 3700000000 HC ANESTHESIA ATTENDED CARE: Performed by: ORTHOPAEDIC SURGERY

## 2023-06-15 RX ORDER — SODIUM CHLORIDE 0.9 % (FLUSH) 0.9 %
5-40 SYRINGE (ML) INJECTION PRN
Status: CANCELLED | OUTPATIENT
Start: 2023-06-15

## 2023-06-15 RX ORDER — ACETAMINOPHEN 500 MG
1000 TABLET ORAL ONCE
Status: COMPLETED | OUTPATIENT
Start: 2023-06-15 | End: 2023-06-15

## 2023-06-15 RX ORDER — FENTANYL CITRATE 50 UG/ML
50 INJECTION, SOLUTION INTRAMUSCULAR; INTRAVENOUS EVERY 5 MIN PRN
Status: CANCELLED | OUTPATIENT
Start: 2023-06-15

## 2023-06-15 RX ORDER — SODIUM CHLORIDE, SODIUM LACTATE, POTASSIUM CHLORIDE, CALCIUM CHLORIDE 600; 310; 30; 20 MG/100ML; MG/100ML; MG/100ML; MG/100ML
INJECTION, SOLUTION INTRAVENOUS CONTINUOUS
Status: DISCONTINUED | OUTPATIENT
Start: 2023-06-15 | End: 2023-06-15 | Stop reason: HOSPADM

## 2023-06-15 RX ORDER — SODIUM CHLORIDE 0.9 % (FLUSH) 0.9 %
5-40 SYRINGE (ML) INJECTION PRN
Status: DISCONTINUED | OUTPATIENT
Start: 2023-06-15 | End: 2023-06-15 | Stop reason: HOSPADM

## 2023-06-15 RX ORDER — OXYCODONE HYDROCHLORIDE AND ACETAMINOPHEN 5; 325 MG/1; MG/1
1 TABLET ORAL EVERY 4 HOURS PRN
Status: DISCONTINUED | OUTPATIENT
Start: 2023-06-15 | End: 2023-06-15 | Stop reason: HOSPADM

## 2023-06-15 RX ORDER — ONDANSETRON 2 MG/ML
4 INJECTION INTRAMUSCULAR; INTRAVENOUS
Status: CANCELLED | OUTPATIENT
Start: 2023-06-15 | End: 2023-06-16

## 2023-06-15 RX ORDER — SODIUM CHLORIDE 0.9 % (FLUSH) 0.9 %
5-40 SYRINGE (ML) INJECTION EVERY 12 HOURS SCHEDULED
Status: DISCONTINUED | OUTPATIENT
Start: 2023-06-15 | End: 2023-06-15 | Stop reason: HOSPADM

## 2023-06-15 RX ORDER — SODIUM CHLORIDE 0.9 % (FLUSH) 0.9 %
5-40 SYRINGE (ML) INJECTION EVERY 12 HOURS SCHEDULED
Status: CANCELLED | OUTPATIENT
Start: 2023-06-15

## 2023-06-15 RX ORDER — CLINDAMYCIN PHOSPHATE 900 MG/50ML
900 INJECTION INTRAVENOUS
Status: COMPLETED | OUTPATIENT
Start: 2023-06-15 | End: 2023-06-15

## 2023-06-15 RX ORDER — LIDOCAINE HYDROCHLORIDE AND EPINEPHRINE 10; 10 MG/ML; UG/ML
INJECTION, SOLUTION INFILTRATION; PERINEURAL PRN
Status: DISCONTINUED | OUTPATIENT
Start: 2023-06-15 | End: 2023-06-15 | Stop reason: ALTCHOICE

## 2023-06-15 RX ORDER — SODIUM CHLORIDE, SODIUM LACTATE, POTASSIUM CHLORIDE, CALCIUM CHLORIDE 600; 310; 30; 20 MG/100ML; MG/100ML; MG/100ML; MG/100ML
INJECTION, SOLUTION INTRAVENOUS CONTINUOUS
Status: CANCELLED | OUTPATIENT
Start: 2023-06-15

## 2023-06-15 RX ADMIN — ACETAMINOPHEN 1000 MG: 500 TABLET ORAL at 06:59

## 2023-06-15 RX ADMIN — SODIUM CHLORIDE, POTASSIUM CHLORIDE, SODIUM LACTATE AND CALCIUM CHLORIDE: 600; 310; 30; 20 INJECTION, SOLUTION INTRAVENOUS at 06:59

## 2023-06-15 ASSESSMENT — PAIN - FUNCTIONAL ASSESSMENT: PAIN_FUNCTIONAL_ASSESSMENT: 0-10

## 2023-06-15 NOTE — OP NOTE
Operative Note    Patient: Carli Thibodeaux MRN: [id number]  Surgery Date: [unfilled]  [unfilled]          Procedure  Primary Surgeon    LEFT KNEE I&D  Kalpesh Spangler MD    * Panel 2 does not exist *  * Panel 2 does not exist *    * Panel 3 does not exist *  * Panel 3 does not exist *     Surgeon(s) and Role:     * Kalpesh Spangler MD - Primary    Other OR Staff/Assistants:  Circulator: Rupa Del Angel RN  Surgical Assistant: Maggie Hawthorne  Scrub Person First: Carlos Long  Perioperative Nurse: Pillo Chaudhry RN    1st Assistant Tasks:  Closing    Pre-operative Diagnosis:  Encounter for removal of sutures [Z48.02]    Post-operative Diagnosis: same as preop diagnosis    Anesthesia Type: Monitor Anesthesia Care     Findings: Left knee stitch abscess    Complications: No    EBL: 2 cc    Specimens: None    Implants:   Implants       Type Not Specified    (Historical) Cement Bne Gentamicin 40 Gm Hi Visc Gentamicin [place] R+G - Implanted   (Left) Knee      Model/Cat number: [id number] : [employer]    Lot number: [id number]        As of 2/13/2023       Status: Unknown                      (Historical) Base Tib Sz 7 Oneal Pkt Attune Rp - Implanted   (Left) Knee      Model/Cat number: [id number] : [employer]    Lot number: [id number] Device identifier: [id number]    Device identifier type: GS1        GUDID Information       Request status Successful        Brand name: ATTUNE Version/Model: 1506-    Company name: [employer] MRI safety info as of 2/13/23: Labeling does not contain MRI Safety Information    Contains dry or latex rubber: No      GMDN P.T. name: Uncoated knee tibia prosthesis, metallic                As of 2/13/2023       Status: Unknown                      (Historical) Component Pat Dxm72th Polyeth Dome Oneal Medialized Attune - Implanted   (Left) Knee      Model/Cat number: [id number] : NeuroPaceSER[employer]Y    Lot number: [id number] Device

## 2023-06-15 NOTE — PERIOP NOTE
Bright red blood noted to dressing to LLE. Dr Rocio Hudson made aware. Ramu Terrell RN in to reinforce dressing.

## 2023-06-15 NOTE — INTERVAL H&P NOTE
Update History & Physical    The Patient's History and Physical was reviewed with the patient. The patient was examined. There was no change. The surgical site was confirmed by the patient and me. Patient understands and wants to proceed with the procedure. If applicable, I have discussed with the patient / power of  the rationale for blood component transfusion; its benefits in treating or preventing fatigue, organ damage, or death; and its risk which includes mild transfusion reactions, rare risk of blood borne infection, or more serious but rare reactions. I have discussed the alternatives to transfusion, including the risk and consequences of not receiving transfusion. The patient / [facility] of  had an opportunity to ask questions and had agreed to proceed with transfusion of blood components. Plan:  The risk, benefits, expected outcome, and alternative to the recommended procedure have been discussed with the patient.

## 2023-06-27 ENCOUNTER — OFFICE VISIT (OUTPATIENT)
Age: 63
End: 2023-06-27

## 2023-06-27 VITALS — WEIGHT: 243 LBS | HEIGHT: 72 IN | BODY MASS INDEX: 32.91 KG/M2

## 2023-06-27 DIAGNOSIS — Z98.890 STATUS POST INCISION AND DRAINAGE: Primary | ICD-10-CM

## 2023-06-27 PROCEDURE — 99024 POSTOP FOLLOW-UP VISIT: CPT | Performed by: NURSE PRACTITIONER

## 2023-07-07 DIAGNOSIS — E78.5 DYSLIPIDEMIA: ICD-10-CM

## 2023-07-07 DIAGNOSIS — R73.01 IFG (IMPAIRED FASTING GLUCOSE): ICD-10-CM

## 2023-07-07 DIAGNOSIS — I10 ESSENTIAL HYPERTENSION: Primary | ICD-10-CM

## 2023-07-07 DIAGNOSIS — Z11.59 NEED FOR HEPATITIS C SCREENING TEST: ICD-10-CM

## 2023-07-07 DIAGNOSIS — Z00.00 PHYSICAL EXAM: ICD-10-CM

## 2023-07-07 RX ORDER — FLUTICASONE PROPIONATE 50 MCG
SPRAY, SUSPENSION (ML) NASAL
Qty: 48 G | Refills: 0 | Status: SHIPPED | OUTPATIENT
Start: 2023-07-07

## 2023-07-07 NOTE — TELEPHONE ENCOUNTER
PCP:  Jai Augustine MD    Last appt: [unfilled]  Future Appointments   Date Time Provider [address]   7/13/2023  8:35 AM IO LAB VISIT [facility]   8/1/2023  8:00 AM Jai Augustine MD [facility]       Requested Prescriptions     Pending Prescriptions Disp Refills    fluticasone (FLONASE) 50 MCG/ACT nasal spray [Pharmacy Med Name: Fluticasone Propionate 50 MCG/ACT Nasal Suspension] 48 g 0     Sig: Use 2 spray(s) in each nostril once daily

## 2023-07-10 DIAGNOSIS — F41.9 ANXIETY: ICD-10-CM

## 2023-07-11 NOTE — TELEPHONE ENCOUNTER
PCP: Rebecca Ramirez MD    Last appt: [unfilled]  Future Appointments   Date Time Provider [address]  [facility]   7/13/2023  8:35 AM IO LAB VISIT [facility]   8/1/2023  8:00 AM Rebecca Ramirez MD [facility]       Requested Prescriptions     Pending Prescriptions Disp Refills    diazePAM (VALIUM) 10 MG tablet [Pharmacy Med Name: diazePAM 10 MG Oral Tablet] 45 tablet 0     Sig: TAKE 1/2 (ONE-HALF) TABLET BY MOUTH EVERY 8 HOURS AS NEEDED FOR ANXIETY FOR UP TO 30 DAYS.   MAX DAILY AMOUNT: 15 MG

## 2023-07-13 ENCOUNTER — NURSE ONLY (OUTPATIENT)
Age: 63
End: 2023-07-13

## 2023-07-13 DIAGNOSIS — Z11.59 NEED FOR HEPATITIS C SCREENING TEST: ICD-10-CM

## 2023-07-13 DIAGNOSIS — Z00.00 PHYSICAL EXAM: ICD-10-CM

## 2023-07-13 DIAGNOSIS — E78.5 DYSLIPIDEMIA: ICD-10-CM

## 2023-07-13 DIAGNOSIS — I10 ESSENTIAL HYPERTENSION: ICD-10-CM

## 2023-07-13 DIAGNOSIS — R73.01 IFG (IMPAIRED FASTING GLUCOSE): ICD-10-CM

## 2023-07-13 DIAGNOSIS — Z12.5 PROSTATE CANCER SCREENING: Primary | ICD-10-CM

## 2023-07-13 DIAGNOSIS — Z12.5 PROSTATE CANCER SCREENING: ICD-10-CM

## 2023-07-14 LAB
ERYTHROCYTE [DISTWIDTH] IN BLOOD BY AUTOMATED COUNT: 15 % (ref 11.6–15.4)
HBA1C MFR BLD: 5.9 % (ref 4.8–5.6)
HCT VFR BLD AUTO: 42.6 % (ref 37.5–51)
HCV IGG SERPL QL IA: NON REACTIVE
HGB BLD-MCNC: 13.5 G/DL (ref 13–17.7)
MCH RBC QN AUTO: 28.5 PG (ref 26.6–33)
MCHC RBC AUTO-ENTMCNC: 31.7 G/DL (ref 31.5–35.7)
MCV RBC AUTO: 90 FL (ref 79–97)
PLATELET # BLD AUTO: 231 X10E3/UL (ref 150–450)
PSA SERPL-MCNC: 1.3 NG/ML (ref 0–4)
RBC # BLD AUTO: 4.74 X10E6/UL (ref 4.14–5.8)
WBC # BLD AUTO: 4.3 X10E3/UL (ref 3.4–10.8)

## 2023-07-14 RX ORDER — DIAZEPAM 10 MG/1
TABLET ORAL
Qty: 45 TABLET | Refills: 0 | Status: SHIPPED | OUTPATIENT
Start: 2023-07-14 | End: 2023-08-14

## 2023-07-17 ENCOUNTER — TELEPHONE (OUTPATIENT)
Age: 63
End: 2023-07-17

## 2023-07-17 NOTE — TELEPHONE ENCOUNTER
Patient called and states that he just had some lab work done and has a physical coming up. Patient states that he normally has his cholesterol, kidney and thyroid checked but those orders were not in with his labs this time. Patient wants to know if he needs to get these labs done before his physical which is scheduled on 8/1/23.

## 2023-08-01 ENCOUNTER — HOSPITAL ENCOUNTER (OUTPATIENT)
Facility: HOSPITAL | Age: 63
Discharge: HOME | End: 2023-08-04

## 2023-08-01 DIAGNOSIS — R73.01 IFG (IMPAIRED FASTING GLUCOSE): ICD-10-CM

## 2023-08-01 DIAGNOSIS — E03.4 ATROPHY OF THYROID (ACQUIRED): ICD-10-CM

## 2023-08-01 DIAGNOSIS — E78.5 DYSLIPIDEMIA: ICD-10-CM

## 2023-08-01 DIAGNOSIS — I10 ESSENTIAL HYPERTENSION: ICD-10-CM

## 2023-08-01 LAB — SENTARA SPECIMEN COLLECTION: NORMAL

## 2023-08-07 LAB
ALBUMIN SERPL-MCNC: 4.9 G/DL (ref 3.9–4.9)
ALBUMIN/GLOB SERPL: 1.7 {RATIO} (ref 1.2–2.2)
ALP SERPL-CCNC: 76 IU/L (ref 44–121)
ALT SERPL-CCNC: 16 IU/L (ref 0–44)
AST SERPL-CCNC: 21 IU/L (ref 0–40)
BILIRUB SERPL-MCNC: 0.3 MG/DL (ref 0–1.2)
BUN SERPL-MCNC: 15 MG/DL (ref 8–27)
BUN/CREAT SERPL: 17 (ref 10–24)
CALCIUM SERPL-MCNC: 9.7 MG/DL (ref 8.6–10.2)
CHLORIDE SERPL-SCNC: 106 MMOL/L (ref 96–106)
CHOLEST SERPL-MCNC: 145 MG/DL (ref 100–199)
CO2 SERPL-SCNC: 15 MMOL/L (ref 20–29)
CREAT SERPL-MCNC: 0.87 MG/DL (ref 0.76–1.27)
EGFRCR SERPLBLD CKD-EPI 2021: 96 ML/MIN/1.73
GLOBULIN SER CALC-MCNC: 2.9 G/DL (ref 1.5–4.5)
GLUCOSE SERPL-MCNC: 104 MG/DL (ref 70–99)
HDLC SERPL-MCNC: 62 MG/DL
LDLC SERPL CALC-MCNC: 68 MG/DL (ref 0–99)
POTASSIUM SERPL-SCNC: 4.5 MMOL/L (ref 3.5–5.2)
PROT SERPL-MCNC: 7.8 G/DL (ref 6–8.5)
SODIUM SERPL-SCNC: 145 MMOL/L (ref 134–144)
T4 FREE SERPL-MCNC: 1.15 NG/DL (ref 0.82–1.77)
TRIGL SERPL-MCNC: 77 MG/DL (ref 0–149)
TSH SERPL DL<=0.005 MIU/L-ACNC: 2.74 UIU/ML (ref 0.45–4.5)
VLDLC SERPL CALC-MCNC: 15 MG/DL (ref 5–40)

## 2023-08-08 ENCOUNTER — OFFICE VISIT (OUTPATIENT)
Age: 63
End: 2023-08-08
Payer: MEDICARE

## 2023-08-08 VITALS
RESPIRATION RATE: 16 BRPM | HEIGHT: 73 IN | DIASTOLIC BLOOD PRESSURE: 86 MMHG | BODY MASS INDEX: 33.93 KG/M2 | OXYGEN SATURATION: 98 % | WEIGHT: 256 LBS | HEART RATE: 80 BPM | SYSTOLIC BLOOD PRESSURE: 135 MMHG | TEMPERATURE: 98.8 F

## 2023-08-08 DIAGNOSIS — M79.672 PAIN IN BOTH FEET: ICD-10-CM

## 2023-08-08 DIAGNOSIS — E03.4 HYPOTHYROIDISM DUE TO ACQUIRED ATROPHY OF THYROID: ICD-10-CM

## 2023-08-08 DIAGNOSIS — M25.50 ARTHRALGIA, UNSPECIFIED JOINT: ICD-10-CM

## 2023-08-08 DIAGNOSIS — I10 ESSENTIAL HYPERTENSION: ICD-10-CM

## 2023-08-08 DIAGNOSIS — N13.8 BPH WITH OBSTRUCTION/LOWER URINARY TRACT SYMPTOMS: ICD-10-CM

## 2023-08-08 DIAGNOSIS — N40.1 BPH WITH OBSTRUCTION/LOWER URINARY TRACT SYMPTOMS: ICD-10-CM

## 2023-08-08 DIAGNOSIS — M79.671 PAIN IN BOTH FEET: ICD-10-CM

## 2023-08-08 DIAGNOSIS — G47.33 OSA (OBSTRUCTIVE SLEEP APNEA): ICD-10-CM

## 2023-08-08 DIAGNOSIS — R73.01 IFG (IMPAIRED FASTING GLUCOSE): ICD-10-CM

## 2023-08-08 DIAGNOSIS — K21.9 GASTROESOPHAGEAL REFLUX DISEASE WITHOUT ESOPHAGITIS: ICD-10-CM

## 2023-08-08 DIAGNOSIS — E78.5 DYSLIPIDEMIA: ICD-10-CM

## 2023-08-08 DIAGNOSIS — I63.9 CEREBROVASCULAR ACCIDENT (CVA), UNSPECIFIED MECHANISM (HCC): ICD-10-CM

## 2023-08-08 DIAGNOSIS — Z71.89 ADVANCED CARE PLANNING/COUNSELING DISCUSSION: ICD-10-CM

## 2023-08-08 DIAGNOSIS — Z00.00 MEDICARE ANNUAL WELLNESS VISIT, SUBSEQUENT: Primary | ICD-10-CM

## 2023-08-08 PROCEDURE — 90670 PCV13 VACCINE IM: CPT | Performed by: INTERNAL MEDICINE

## 2023-08-08 PROCEDURE — 99211 OFF/OP EST MAY X REQ PHY/QHP: CPT | Performed by: INTERNAL MEDICINE

## 2023-08-08 PROCEDURE — G0402 INITIAL PREVENTIVE EXAM: HCPCS | Performed by: INTERNAL MEDICINE

## 2023-08-08 PROCEDURE — 3078F DIAST BP <80 MM HG: CPT | Performed by: INTERNAL MEDICINE

## 2023-08-08 PROCEDURE — G0009 ADMIN PNEUMOCOCCAL VACCINE: HCPCS | Performed by: INTERNAL MEDICINE

## 2023-08-08 PROCEDURE — 99497 ADVNCD CARE PLAN 30 MIN: CPT | Performed by: INTERNAL MEDICINE

## 2023-08-08 PROCEDURE — 3074F SYST BP LT 130 MM HG: CPT | Performed by: INTERNAL MEDICINE

## 2023-08-08 PROCEDURE — 99214 OFFICE O/P EST MOD 30 MIN: CPT | Performed by: INTERNAL MEDICINE

## 2023-08-08 PROCEDURE — 1124F ACP DISCUSS-NO DSCNMKR DOCD: CPT | Performed by: INTERNAL MEDICINE

## 2023-08-08 SDOH — ECONOMIC STABILITY: HOUSING INSECURITY
IN THE LAST 12 MONTHS, WAS THERE A TIME WHEN YOU DID NOT HAVE A STEADY PLACE TO SLEEP OR SLEPT IN A SHELTER (INCLUDING NOW)?: NO

## 2023-08-08 SDOH — ECONOMIC STABILITY: INCOME INSECURITY: HOW HARD IS IT FOR YOU TO PAY FOR THE VERY BASICS LIKE FOOD, HOUSING, MEDICAL CARE, AND HEATING?: NOT HARD AT ALL

## 2023-08-08 SDOH — ECONOMIC STABILITY: FOOD INSECURITY: WITHIN THE PAST 12 MONTHS, YOU WORRIED THAT YOUR FOOD WOULD RUN OUT BEFORE YOU GOT MONEY TO BUY MORE.: NEVER TRUE

## 2023-08-08 SDOH — ECONOMIC STABILITY: FOOD INSECURITY: WITHIN THE PAST 12 MONTHS, THE FOOD YOU BOUGHT JUST DIDN'T LAST AND YOU DIDN'T HAVE MONEY TO GET MORE.: NEVER TRUE

## 2023-08-08 ASSESSMENT — PATIENT HEALTH QUESTIONNAIRE - PHQ9
2. FEELING DOWN, DEPRESSED OR HOPELESS: 0
SUM OF ALL RESPONSES TO PHQ QUESTIONS 1-9: 0
SUM OF ALL RESPONSES TO PHQ QUESTIONS 1-9: 0
SUM OF ALL RESPONSES TO PHQ9 QUESTIONS 1 & 2: 0
1. LITTLE INTEREST OR PLEASURE IN DOING THINGS: 0
SUM OF ALL RESPONSES TO PHQ QUESTIONS 1-9: 0
SUM OF ALL RESPONSES TO PHQ QUESTIONS 1-9: 0

## 2023-08-09 PROBLEM — M17.0 PRIMARY OSTEOARTHRITIS OF BOTH KNEES: Status: RESOLVED | Noted: 2017-10-18 | Resolved: 2023-08-09

## 2023-08-09 PROBLEM — Q21.12 PFO (PATENT FORAMEN OVALE): Status: RESOLVED | Noted: 2018-01-12 | Resolved: 2023-08-09

## 2023-08-09 PROBLEM — R60.0 LOCALIZED EDEMA: Status: RESOLVED | Noted: 2018-01-12 | Resolved: 2023-08-09

## 2023-08-09 PROBLEM — Z96.651 S/P TKR (TOTAL KNEE REPLACEMENT), RIGHT: Status: RESOLVED | Noted: 2023-03-23 | Resolved: 2023-08-09

## 2023-08-09 PROBLEM — N40.1 BPH WITH OBSTRUCTION/LOWER URINARY TRACT SYMPTOMS: Status: ACTIVE | Noted: 2023-01-01

## 2023-08-09 PROBLEM — M17.12 PRIMARY OSTEOARTHRITIS OF LEFT KNEE: Status: RESOLVED | Noted: 2018-01-23 | Resolved: 2023-08-09

## 2023-08-09 PROBLEM — M25.569 KNEE PAIN: Status: RESOLVED | Noted: 2017-10-18 | Resolved: 2023-08-09

## 2023-08-09 PROBLEM — S83.232A COMPLEX TEAR OF MEDIAL MENISCUS OF LEFT KNEE AS CURRENT INJURY: Status: RESOLVED | Noted: 2017-10-18 | Resolved: 2023-08-09

## 2023-08-09 PROBLEM — N13.8 BPH WITH OBSTRUCTION/LOWER URINARY TRACT SYMPTOMS: Status: ACTIVE | Noted: 2023-01-01

## 2023-08-09 NOTE — ACP (ADVANCE CARE PLANNING)
Advance Care Planning     Advance Care Planning (ACP) Physician/NP/PA Conversation    Date of Conversation: 8/8/2023  Conducted with: Patient with Decision Making Capacity    Healthcare Decision Maker:      Primary Decision Maker: Elizabeth Garzon - [place] - [phone]    Click here to complete [address] including selection of the Healthcare Decision Maker Relationship (ie \"Primary\")  Today we documented Decision Maker(s) consistent with Legal Next of Kin hierarchy. Care Preferences:    Hospitalization: \"If your health worsens and it becomes clear that your chance of recovery is unlikely, what would be your preference regarding hospitalization? \"  The patient would prefer hospitalization. Ventilation: \"If you were unable to breath on your own and your chance of recovery was unlikely, what would be your preference about the use of a ventilator (breathing machine) if it was available to you? \"  The patient would desire the use of a ventilator. Resuscitation: \"In the event your heart stopped as a result of an underlying serious health condition, would you want attempts made to restart your heart, or would you prefer a natural death? \"  Yes, attempt to resuscitate.     ventilation preferences, hospitalization preferences, and resuscitation preferences    Conversation Outcomes / Follow-Up Plan:  ACP in process - information provided, considering goals and options  Reviewed DNR/DNI and patient elects Full Code (Attempt Resuscitation)    Length of Voluntary ACP Conversation in minutes:  16 minutes    Yanira Cox MD

## 2023-08-09 NOTE — PROGRESS NOTES
70 y.o. male who presents for evaluation. He successfully underwent bilat TKR by Dr Sara Vasquez earlier this spring. Still recovering with lots of swelling, rom improving, still having some pain. He has seen Dr Baldomero Holliday NP a few times and was told it could take up to a year for full recovery. Of note, he had postop urination issues and was told to see urology for elevated postvoid residuals. He does report nocturia, frequency and incomplete emptying at times    He is having some problems with the bilat feet and interested in seeing a specialist.  He's had 'flat feet all his life'. Also, he does have a lot of 'aches and pains' and is wondering if he has any systemic inflammation ongoing. No overt joint swelling outside of the knees    No cardiovascular complaints.   Exercise is limited by above but he's trying to walk at least    The sleep apnea is controlled on cpap     No gi issues    Past Medical History:   Diagnosis Date    Allergic rhinitis, seasonal     Anxiety     JUDD-7 was 11/21 from 7/11    BPH with obstruction/lower urinary tract symptoms 2023    Colon polyps 2008    Dr. Shahnaz Villavicencio    COVID-19 vaccine dose declined 08/2023    boosters    CVA (cerebral vascular accident) [facility]) 2006    Dr. Quinton Paniagua s/p PFO closure, carotids negative    Degenerative arthritis of cervical spine 2013    on mri    Depression     PHQ-9 was 7/27 from (7/11); 10/27 from (5/15); lexapro intol; effexor briefly 2020    Diverticulosis     Dyslipidemia     intol lipitor, prava, zocor; tolerated mevacor    FHx: heart disease     GERD (gastroesophageal reflux disease)     egd 10/22 Dr Charles Munoz mild reflux changes, neg dysplasia or eoe    H/O cardiovascular stress test     ETT neg (5/16)    H/O echocardiogram     (1/18) nl lv, ef 60%, mild dd, mild/mod LVH, tr AI, aortic root 4cm, well seated pfo closure device Dr Sammie Iniguez    Hearing loss 2016    right sided; s/p myringotomy Dr Javy Figueroa; now seeing diff ENT    Hemorrhoid     Hypertension 03/22/2018
Laverne Moran presents today for   Chief Complaint   Patient presents with    Hypertension    Thyroid Problem       Is someone accompanying this pt? No    Is the patient using any DME equipment during OV? No    Depression Screening:  PHQ-9  8/8/2023   Little interest or pleasure in doing things 0   Little interest or pleasure in doing things -   Feeling down, depressed, or hopeless 0   PHQ-2 Score 0   Total Score PHQ 2 -   PHQ-9 Total Score 0               Health Maintenance reviewed and discussed and ordered per Provider. Health Maintenance Due   Topic Date Due    HIV screen  Never done    Shingles vaccine (2 of 3) 12/21/2011    COVID-19 Vaccine (4 - Booster for Moderna series) 01/09/2022    Pneumococcal 65+ years Vaccine (1 - PCV) Never done    Flu vaccine (1) 08/01/2023   . 1. \"Have you been to the ER, urgent care clinic since your last visit? Hospitalized since your last visit? \" No    2. \"Have you seen or consulted any other health care providers outside of the [address] [place] [address] since your last visit? \" No    3. For patients over 45: Has the patient had a colonoscopy? Yes - no Care Gap present     If the patient is female:    4. For patients over 36: Has the patient had a mammogram? NA - based on age or sex    11. For patients over 21: Has the patient had a pap smear?  NA - based on age or sex
CareTeam (Including outside providers/suppliers regularly involved in providing care):   Patient Care Team:  Nate Verduzco MD as PCP - Demetria Schneider MD as PCP - Empaneled Provider     Reviewed and updated this visit:  Tobacco  Allergies  Meds  Problems  Med Hx  Surg Hx  Soc Hx  Fam Hx

## 2023-08-10 ENCOUNTER — TELEPHONE (OUTPATIENT)
Age: 63
End: 2023-08-10

## 2023-08-10 DIAGNOSIS — K21.9 GASTROESOPHAGEAL REFLUX DISEASE WITHOUT ESOPHAGITIS: Primary | ICD-10-CM

## 2023-08-10 RX ORDER — OMEPRAZOLE 40 MG/1
40 CAPSULE, DELAYED RELEASE ORAL
Qty: 90 CAPSULE | Refills: 3 | Status: SHIPPED | OUTPATIENT
Start: 2023-08-10

## 2023-08-10 NOTE — TELEPHONE ENCOUNTER
Wife is calling stating pharmacy did not receive rx for Omeprazole. [facility] [address] [facility] [facility] [place]

## 2023-08-21 DIAGNOSIS — F41.9 ANXIETY: ICD-10-CM

## 2023-08-23 RX ORDER — DIAZEPAM 10 MG/1
TABLET ORAL
Qty: 45 TABLET | Refills: 0 | Status: SHIPPED | OUTPATIENT
Start: 2023-08-23 | End: 2023-09-22

## 2023-08-25 ENCOUNTER — TELEPHONE (OUTPATIENT)
Age: 63
End: 2023-08-25

## 2023-08-25 DIAGNOSIS — K21.9 GASTROESOPHAGEAL REFLUX DISEASE WITHOUT ESOPHAGITIS: ICD-10-CM

## 2023-08-25 NOTE — TELEPHONE ENCOUNTER
[facility] is requesting refills    omeprazole (PRILOSEC) 40 MG delayed release capsule 90 capsule 3 8/10/2023     Sig - Route: Take 1 capsule by mouth every morning (before breakfast) - Oral      lisinopriL (PRINIVIL, ZESTRIL) 2.5 mg tablet 90 Tablet 3 2/1/2023     Sig: TAKE 1 TABLET BY MOUTH ONCE DAILY FOR HIGH BLOOD PRESSURE      levothyroxine (SYNTHROID) 100 mcg tablet 90 Tablet 3 11/9/2022     Sig: TAKE 1 TABLET BY MOUTH ONCE DAILY BEFORE BREAKFAST      rosuvastatin (Crestor) 20 mg tablet 90 Tablet 3 11/2/2022     Sig - Route: Take 1 Tablet by mouth nightly.  - Oral

## 2023-08-28 RX ORDER — LEVOTHYROXINE SODIUM 0.1 MG/1
TABLET ORAL
Qty: 90 TABLET | Refills: 3 | Status: SHIPPED | OUTPATIENT
Start: 2023-08-28

## 2023-08-28 RX ORDER — ROSUVASTATIN CALCIUM 20 MG/1
20 TABLET, COATED ORAL NIGHTLY
Qty: 90 TABLET | Refills: 3 | Status: SHIPPED | OUTPATIENT
Start: 2023-08-28

## 2023-08-28 RX ORDER — LISINOPRIL 2.5 MG/1
2.5 TABLET ORAL DAILY
Qty: 90 TABLET | Refills: 3 | Status: SHIPPED | OUTPATIENT
Start: 2023-08-28

## 2023-08-28 RX ORDER — OMEPRAZOLE 40 MG/1
40 CAPSULE, DELAYED RELEASE ORAL
Qty: 90 CAPSULE | Refills: 3 | Status: SHIPPED | OUTPATIENT
Start: 2023-08-28

## 2023-08-30 DIAGNOSIS — Z96.652 STATUS POST LEFT KNEE REPLACEMENT: Primary | ICD-10-CM

## 2023-08-30 DIAGNOSIS — Z96.651 STATUS POST RIGHT KNEE REPLACEMENT: ICD-10-CM

## 2023-08-30 NOTE — TELEPHONE ENCOUNTER
Patient's wife is calling for lab results. Stating they were drawn on Aug 21st and it looks like all of the results are on his [facility].

## 2023-08-31 RX ORDER — ROSUVASTATIN CALCIUM 20 MG/1
20 TABLET, COATED ORAL NIGHTLY
Qty: 30 TABLET | Refills: 3 | Status: SHIPPED | OUTPATIENT
Start: 2023-08-31

## 2023-08-31 NOTE — TELEPHONE ENCOUNTER
Pls call    Gayla, rheum screen, parvo b19 neg  Esr and crp nl  Vit d slightly low - start otc vit d 2000 iu/d if not taking

## 2023-09-01 NOTE — TELEPHONE ENCOUNTER
Patient's spouse is calling stating [facility] contacted them and told them that they have contacted us 3 times. They have a question about the Rosuvastatin. I advised her I do not see anything in his chart where they have tried reaching us.

## 2023-09-01 NOTE — TELEPHONE ENCOUNTER
Patient states he wants rosuvastatin sent to [facility]. [facility] states patient have allergy listed to pravastatin and atorvastatin and want to know if it's ok to fill rosuvastatin.      Reference # [id number]

## 2023-09-05 RX ORDER — ROSUVASTATIN CALCIUM 20 MG/1
20 TABLET, COATED ORAL NIGHTLY
Qty: 90 TABLET | Refills: 3 | Status: SHIPPED | OUTPATIENT
Start: 2023-09-05

## 2023-09-11 ENCOUNTER — HOSPITAL ENCOUNTER (OUTPATIENT)
Facility: HOSPITAL | Age: 63
Setting detail: RECURRING SERIES
Discharge: HOME | End: 2023-09-14
Payer: MEDICARE

## 2023-09-11 PROCEDURE — 97162 PT EVAL MOD COMPLEX 30 MIN: CPT

## 2023-09-11 PROCEDURE — 97110 THERAPEUTIC EXERCISES: CPT

## 2023-09-11 NOTE — THERAPY EVALUATION
Care/Home Management, 01930 Vasopneumatic Device  (Vasopnuematic compression justification:  Per bilateral girth measures taken and listed above the edema is considered significant and having an impact on the patient's strength, balance, gait, transfers, self care, and ADL's), and 66347 Gait Training  Patient / Family readiness to learn indicated by: asking questions, trying to perform skills, interest, return verbalization , and return demonstration   Persons(s) to be included in education: patient (P)  Barriers to Learning/Limitations: none  Measures taken if barriers to learning present: n/a  Patient Goal (s): \"increase flexibility\"  Patient Self Reported Health Status: good  Rehabilitation Potential: good    Short Term Goals: To be accomplished in 4 weeks  Pt will be independent and compliant with HEP  EVAL: established  2. Improve B knee flexion AROM to at least 120 deg to improve mobility for transfers  EVAL: R 113, L 105  Long Term Goals: To be accomplished in 8 weeks  Pt will improve 5x STS test by at least 10 seconds to indicate improved function mobility  EVAL: 21\"  2. Improve B hip abd MMT to at least 4-/5 to improve LE strength/stability for prolonged walking  EVAL: R 3/5, L 3+/5  3. Improve FOTO Score to >/= 59/100 to indicate improved function  EVAL: 47    Frequency / Duration: Patient to be seen 2 times per week for 8 weeks    Patient/ Caregiver education and instruction: Diagnosis, prognosis, exercises [x]  Plan of care has been reviewed with PTA    Certification Period: 9/11/2023 - 11/10/2023    Rosa M Nicole, PT       9/11/2023       11:46 AM    Payor: Margareth Rosa / Plan: Margareth Rosa / Product Type: *No Product type* /     Physician signature required for Medicare, Medicaid, Worker's Comp, Direct Access   ===================================================================  I certify that the above Therapy Services are being furnished while the patient is under my care.  I agree with the

## 2023-09-11 NOTE — PROGRESS NOTES
PHYSICAL / OCCUPATIONAL THERAPY - DAILY TREATMENT NOTE (updated )  For Eval visit    Patient Name: John Bustos    Date: 2023    : 1960  Insurance: Payor: Marcos Og / Plan: Marcos Og / Product Type: *No Product type* /      Patient  verified yes     Visit #   Current / Total 1 16   Time   In / Out [id number]   Pain   In / Out 3/10 3/10   Subjective Functional Status/Changes: See POC     TREATMENT AREA =  see POC    OBJECTIVE           27 min   Eval - untimed                      Therapeutic Procedures: Tx Min Billable or 1:1 Min (if diff from Tx Min) Procedure, Rationale, Specifics   8 8 49345 Therapeutic Exercise (timed):  increase ROM, strength, coordination, balance, and proprioception to improve patient's ability to progress to PLOF and address remaining functional goals.  (see flow sheet as applicable)     Details if applicable:  HEP, see chart copy            Details if applicable:            Details if applicable:            Details if applicable:            Details if applicable:     8 8 [facility] Totals Reminder: bill using total billable min of TIMED therapeutic procedures (example: do not include dry needle or estim unattended, both untimed codes, in totals to left)  8-22 min = 1 unit; 23-37 min = 2 units; 38-52 min = 3 units; 53-67 min = 4 units; 68-82 min = 5 units   Total Total     [x]  Patient Education billed concurrently with other procedures   [x] Review HEP    [] Progressed/Changed HEP, detail:    [] Other detail:       Objective Information/Functional Measures/Assessment    See POC    Patient will continue to benefit from skilled PT / OT services to modify and progress therapeutic interventions, analyze and address functional mobility deficits, analyze and address ROM deficits, analyze and address strength deficits, analyze and address soft tissue restrictions, analyze and cue for proper movement patterns, analyze and modify for postural abnormalities, analyze and

## 2023-10-02 ENCOUNTER — HOSPITAL ENCOUNTER (OUTPATIENT)
Facility: HOSPITAL | Age: 63
Setting detail: RECURRING SERIES
Discharge: HOME | End: 2023-10-05
Payer: MEDICARE

## 2023-10-02 PROCEDURE — 97016 VASOPNEUMATIC DEVICE THERAPY: CPT

## 2023-10-02 PROCEDURE — 97110 THERAPEUTIC EXERCISES: CPT

## 2023-10-02 PROCEDURE — 97140 MANUAL THERAPY 1/> REGIONS: CPT

## 2023-10-02 PROCEDURE — 97530 THERAPEUTIC ACTIVITIES: CPT

## 2023-10-04 ENCOUNTER — HOSPITAL ENCOUNTER (OUTPATIENT)
Facility: HOSPITAL | Age: 63
Setting detail: RECURRING SERIES
Discharge: HOME | End: 2023-10-07
Payer: MEDICARE

## 2023-10-04 PROCEDURE — 97140 MANUAL THERAPY 1/> REGIONS: CPT

## 2023-10-04 PROCEDURE — 97110 THERAPEUTIC EXERCISES: CPT

## 2023-10-04 NOTE — PROGRESS NOTES
PHYSICAL / OCCUPATIONAL THERAPY - DAILY TREATMENT NOTE (updated )    Patient Name: Vishal Ruvalcaba    Date: 10/4/2023    : 1960  Insurance: Payor: Mejia Roles / Plan: Mejia Roles / Product Type: *No Product type* /      Patient  verified Yes     Visit #   Current / Total 3 16   Time   In / Out 255 pm  330 pm    Pain   In / Out 0/10  0/10    Subjective Functional Status/Changes: Patient reports having a hard time sleeping at night and getting up from a seated position. TREATMENT AREA =  Pain in left knee [M25.562]  Pain in right knee [M25.561]    OBJECTIVE    Therapeutic Procedures: Tx Min Billable or 1:1 Min (if diff from Tx Min) Procedure, Rationale, Specifics   22  27288 Therapeutic Exercise (timed):  increase ROM, strength, coordination, balance, and proprioception to improve patient's ability to progress to PLOF and address remaining functional goals. (see flow sheet as applicable)     Details if applicable:       13  99379 Manual Therapy (timed):  decrease pain, increase ROM, increase tissue extensibility, and decrease trigger points to improve patient's ability to progress to PLOF and address remaining functional goals. The manual therapy interventions were performed at a separate and distinct time from the therapeutic activities interventions .  (see flow sheet as applicable)     Details if applicable:  DTM to B knee musculature          Details if applicable:            Details if applicable:     28  [facility] Totals Reminder: bill using total billable min of TIMED therapeutic procedures (example: do not include dry needle or estim unattended, both untimed codes, in totals to left)  8-22 min = 1 unit; 23-37 min = 2 units; 38-52 min = 3 units; 53-67 min = 4 units; 68-82 min = 5 units   Total Total     [x]  Patient Education billed concurrently with other procedures   [x] Review HEP    [] Progressed/Changed HEP, detail:    [] Other detail:       Objective Information/Functional

## 2023-10-06 ENCOUNTER — APPOINTMENT (OUTPATIENT)
Facility: HOSPITAL | Age: 63
End: 2023-10-06
Payer: MEDICARE

## 2023-10-08 DIAGNOSIS — F41.9 ANXIETY: ICD-10-CM

## 2023-10-09 ENCOUNTER — HOSPITAL ENCOUNTER (OUTPATIENT)
Facility: HOSPITAL | Age: 63
Setting detail: RECURRING SERIES
Discharge: HOME | End: 2023-10-12
Payer: MEDICARE

## 2023-10-09 PROCEDURE — 97016 VASOPNEUMATIC DEVICE THERAPY: CPT

## 2023-10-09 PROCEDURE — 97530 THERAPEUTIC ACTIVITIES: CPT

## 2023-10-09 PROCEDURE — 97110 THERAPEUTIC EXERCISES: CPT

## 2023-10-09 NOTE — PROGRESS NOTES
[facility]  [address] [facility], [address] [place] [address]  JJ:[phone] GV:[phone]  PHYSICAL THERAPY PROGRESS NOTE  Patient Name: Yessica Whittington : 1960   Treatment/Medical Diagnosis: Pain in left knee [M25.562]  Pain in right knee [M25.561]   Referral Source: Grey Wolfe MD     Date of Initial Visit: 2023 Attended Visits: 4 Missed Visits: 0     SUMMARY OF TREATMENT  PT intervention has consisted of therapeutic exercise, functional activities, manual therapy, neuromuscular re-education and HEP to improve B knee ROM and strength to improve patient's ability to functional activities with ease. CP/Vaso utilized for pain management. CURRENT STATUS  Patient has attended 4 total PT sessions since the IE and has shown some progression since the IE. Patient's FOTO assessment, ambulation and B knee ROM has improved since the start of PT. Patient continues to have increased B knee pain laying on his sides, getting up from the floor and a seated position. Patient reports 30% improvement with PT and will continue to benefit from skilled PT / OT services to modify and progress therapeutic interventions, analyze and address functional mobility deficits, analyze and address ROM deficits, analyze and address strength deficits, analyze and address soft tissue restrictions, analyze and cue for proper movement patterns, analyze and modify for postural abnormalities, and instruct in home and community integration to address functional deficits and attain remaining goals. Functional Gains: Ambulation, B knee ROM  Functional Deficits: Laying on sides, overall B knee pain, getting up from floor and seated position  % improvement: 30% improvement   Pain   Average: 3-4/10                  Best: 0/10                Worst: 610  Patient Goal: \"To improve my flexibility and he able to lay on my side. \"    Progress toward goals     Short Term Goals:  To be
mobility deficits, analyze and address ROM deficits, analyze and address strength deficits, analyze and address soft tissue restrictions, analyze and cue for proper movement patterns, analyze and modify for postural abnormalities, and instruct in home and community integration to address functional deficits and attain remaining goals. Progress toward goals / Updated goals:  []  See Progress Note/Recertification    Short Term Goals: To be accomplished in 4 weeks  Pt will be independent and compliant with HEP  EVAL: established  Current: Patient reports compliance with HEP everyday. MET     2. Improve B knee flexion AROM to at least 120 deg to improve mobility for transfers  EVAL: R 113, L 105  Current: R 118, L 110 deg. Progressing    Long Term Goals: To be accomplished in 8 weeks  Pt will improve 5x STS test by at least 10 seconds to indicate improved function mobility  EVAL: 21\"  Current: 15 seconds. Progressing     2. Improve B hip abd MMT to at least 4-/5 to improve LE strength/stability for prolonged walking  EVAL: R 3/5, L 3+/5  Current: R 3+/5, L 3+/5. Progressing     3. Improve FOTO Score to >/= 59/100 to indicate improved function  EVAL: 47  Current: 52 points.  Progressing    Next PN/ RC due 10/11/2023   Auth due 16 visits exp 1/10/2024    PLAN  - Continue Plan of Care  - Upgrade activities as tolerated    Finesse Avitia, ASIA    10/9/2023    8:30 AM    Future Appointments   Date Time Provider [address]   10/9/2023 10:10 AM Finesse Avitia, PTA [facility]   10/13/2023 12:10 PM Finesse Avitia, PTA [facility]   10/16/2023 10:50 AM Finesse Avitia, PTA [facility]   10/20/2023 10:10 AM Finesse Avitia, PTA [facility]   10/23/2023 10:10 AM Quique Verdugo, PT [facility]   10/27/2023 10:10 AM Finesse Avitia, PTA [facility]   11/10/2023 10:00 AM KIEL Rob   8/9/2024  9:20 AM Carlos Magallon MD [facility]

## 2023-10-11 RX ORDER — DIAZEPAM 10 MG/1
TABLET ORAL
Qty: 45 TABLET | Refills: 0 | Status: SHIPPED | OUTPATIENT
Start: 2023-10-11 | End: 2023-11-10

## 2023-10-13 ENCOUNTER — HOSPITAL ENCOUNTER (OUTPATIENT)
Facility: HOSPITAL | Age: 63
Setting detail: RECURRING SERIES
Discharge: HOME | End: 2023-10-16
Payer: MEDICARE

## 2023-10-13 PROCEDURE — 97110 THERAPEUTIC EXERCISES: CPT

## 2023-10-13 PROCEDURE — 97140 MANUAL THERAPY 1/> REGIONS: CPT

## 2023-10-13 PROCEDURE — 97530 THERAPEUTIC ACTIVITIES: CPT

## 2023-10-13 NOTE — PROGRESS NOTES
ROM, strength, coordination, balance, and proprioception to improve patient's ability to progress to PLOF and address remaining functional goals. (see flow sheet as applicable)     Details if applicable:       15   18713 Therapeutic Activity (timed):  use of dynamic activities replicating functional movements to increase ROM, strength, coordination, balance, and proprioception in order to improve patient's ability to progress to PLOF and address remaining functional goals. (see flow sheet as applicable)      Details if applicable:     10  54034 Manual Therapy (timed):  decrease pain, increase ROM, increase tissue extensibility, and decrease trigger points to improve patient's ability to progress to PLOF and address remaining functional goals. The manual therapy interventions were performed at a separate and distinct time from the therapeutic activities interventions . (see flow sheet as applicable)      Details if applicable:            Details if applicable:     39  [facility] Totals Reminder: bill using total billable min of TIMED therapeutic procedures (example: do not include dry needle or estim unattended, both untimed codes, in totals to left)  8-22 min = 1 unit; 23-37 min = 2 units; 38-52 min = 3 units; 53-67 min = 4 units; 68-82 min = 5 units   Total Total     [x]  Patient Education billed concurrently with other procedures   [x] Review HEP    [] Progressed/Changed HEP, detail:    [] Other detail:       Objective Information/Functional Measures/Assessment  Left knee ROM- 111 degs  Right knee ROM- 120 degs    Lateral step ups at 6\" step, HR off 4'' step     Patient presents to PT with decreased reports of B knee pain. Added and progressed exercises, as listed above, to further assist it with increasing B hip abduction strength. Minimal verbal cues were required for proper form. Patient's left knee ROM improved when compared to last visit. Initiated MHP to B knees to assist with decreasing B knee pain.  Patient

## 2023-10-16 ENCOUNTER — APPOINTMENT (OUTPATIENT)
Facility: HOSPITAL | Age: 63
End: 2023-10-16
Payer: MEDICARE

## 2023-10-16 ENCOUNTER — TELEPHONE (OUTPATIENT)
Facility: HOSPITAL | Age: 63
End: 2023-10-16

## 2023-10-16 NOTE — TELEPHONE ENCOUNTER
Patient cancelled appt. on 10/16/23 at 7:35am due to the patient not feeling well and he is not able to make his appt. today.

## 2023-10-20 ENCOUNTER — HOSPITAL ENCOUNTER (OUTPATIENT)
Facility: HOSPITAL | Age: 63
Setting detail: RECURRING SERIES
Discharge: HOME | End: 2023-10-23
Payer: MEDICARE

## 2023-10-20 PROCEDURE — 97110 THERAPEUTIC EXERCISES: CPT

## 2023-10-20 PROCEDURE — 97140 MANUAL THERAPY 1/> REGIONS: CPT

## 2023-10-20 PROCEDURE — 97530 THERAPEUTIC ACTIVITIES: CPT

## 2023-10-20 NOTE — PROGRESS NOTES
PHYSICAL / OCCUPATIONAL THERAPY - DAILY TREATMENT NOTE (updated )    Patient Name: Topher Kevin    Date: 10/20/2023    : 1960  Insurance: Payor: Alona Biggs / Plan: Alona Biggs / Product Type: *No Product type* /      Patient  verified Yes     Visit #   Current / Total 6 16   Time   In / Out 1010 am  1104 am    Pain   In / Out 0/10  0/10    Subjective Functional Status/Changes: Patient explains that his knees are okay as long as he is active. Patient states that most of his pain comes when getting up from sitting a long period of time. TREATMENT AREA =  Pain in left knee [M25.562]  Pain in right knee [M25.561]    OBJECTIVE    Modalities Rationale:     decrease edema, decrease inflammation, and decrease pain to improve patient's ability to progress to PLOF and address remaining functional goals. min [] Estim Unattended, type/location:                                      []  w/ice    []  w/heat    min [] Estim Attended, type/location:                                     []  w/US     []  w/ice    []  w/heat    []  TENS insruct      min []  Mechanical Traction: type/lbs                   []  pro   []  sup   []  int   []  cont    []  before manual    []  after manual    min []  Ultrasound, settings/location:     10 min  unbill []  Ice     [x]  Heat    location/position: Reclined to B knees    min []  Paraffin,  details:     min []  Vasopneumatic Device, press/temp:      min []  Radha Crofts / Imelad Alt: If using vaso (only need to measure limb vaso being performed on)      pre-treatment girth :       post-treatment girth :       measured at (landmark location) :      min []  Other:    Skin assessment post-treatment:   Redness, no adverse reactions        Therapeutic Procedures:     Tx Min Billable or 1:1 Min (if diff from Tx Min) Procedure, Rationale, Specifics   20  70862 Therapeutic Exercise (timed):  increase ROM, strength, coordination, balance, and proprioception to improve patient's

## 2023-10-23 ENCOUNTER — HOSPITAL ENCOUNTER (OUTPATIENT)
Facility: HOSPITAL | Age: 63
Setting detail: RECURRING SERIES
Discharge: HOME | End: 2023-10-26
Payer: MEDICARE

## 2023-10-23 PROCEDURE — 97110 THERAPEUTIC EXERCISES: CPT

## 2023-10-23 PROCEDURE — 97140 MANUAL THERAPY 1/> REGIONS: CPT

## 2023-10-23 PROCEDURE — 97530 THERAPEUTIC ACTIVITIES: CPT

## 2023-10-23 NOTE — PROGRESS NOTES
goals / Updated goals:  []  See Progress Note/Recertification  Short Term Goals: To be accomplished in 4 weeks  Pt will be independent and compliant with HEP  PN: Patient reports compliance with HEP everyday. 2. Improve B knee flexion AROM to at least 120 deg to improve mobility for transfers  PN: R 118, L 110 deg. Current: Left knee ROM- 110 degs; Right knee ROM- 120 degs. 10/20/2023     Long Term Goals: To be accomplished in 8 weeks  Pt will improve 5x STS test by at least 10 seconds to indicate improved function mobility  PN: 15 seconds. Progressing    CURRENT NA 10/23/23  2. Improve B hip abd MMT to at least 4-/5 to improve LE strength/stability for prolonged walking  PN: R 3+/5, L 3+/5. Progressing    CURRENT NA 10/23/23  3. Improve FOTO Score to >/= 59/100 to indicate improved function  PN: 52 points.  Progressing  CURRENT NA 10/23/23     Next PN/ RC due 11/10/2023   Auth due 16 visits exp 1/10/2024       PLAN  - Continue Plan of Care  - Upgrade activities as tolerated    Nella Hatchet, PT    10/23/2023    10:20 AM    Future Appointments   Date Time Provider [address]   10/27/2023 10:10 AM Julien He PTA [facility]   1/8/2024  2:00 PM Yulisa Rodarte PA-C [facility] Sched   8/9/2024  9:20 AM Indy Couch MD [facility]

## 2023-10-27 ENCOUNTER — HOSPITAL ENCOUNTER (OUTPATIENT)
Facility: HOSPITAL | Age: 63
Setting detail: RECURRING SERIES
Discharge: HOME | End: 2023-10-30
Payer: MEDICARE

## 2023-10-27 PROCEDURE — 97110 THERAPEUTIC EXERCISES: CPT

## 2023-10-27 PROCEDURE — 97140 MANUAL THERAPY 1/> REGIONS: CPT

## 2023-10-27 PROCEDURE — 97530 THERAPEUTIC ACTIVITIES: CPT

## 2023-10-27 NOTE — PROGRESS NOTES
PHYSICAL / OCCUPATIONAL THERAPY - DAILY TREATMENT NOTE (updated )    Patient Name: Jolene Gowers    Date: 10/27/2023    : 1960  Insurance: Payor: Marquez Caro / Plan: Marquez Caro / Product Type: *No Product type* /      Patient  verified Yes     Visit #   Current / Total 8 16   Time   In / Out 1014 am  1110 am   Pain   In / Out 0/10  0/10    Subjective Functional Status/Changes: Patient reports that he is still having the increased difficulty and B knee pain getting up and down from a chair. TREATMENT AREA =  Pain in left knee [M25.562]  Pain in right knee [M25.561]    OBJECTIVE    Modalities Rationale:     decrease edema, decrease inflammation, and decrease pain to improve patient's ability to progress to PLOF and address remaining functional goals. min [] Estim Unattended, type/location:                                      []  w/ice    []  w/heat    min [] Estim Attended, type/location:                                     []  w/US     []  w/ice    []  w/heat    []  TENS insruct      min []  Mechanical Traction: type/lbs                   []  pro   []  sup   []  int   []  cont    []  before manual    []  after manual    min []  Ultrasound, settings/location:     10 min  unbill []  Ice     [x]  Heat    location/position: Reclined to B knees    min []  Paraffin,  details:     min []  Vasopneumatic Device, press/temp:      min []  Paz Anon / Kriste [place]: If using vaso (only need to measure limb vaso being performed on)      pre-treatment girth :      post-treatment girth :       measured at (landmark location)     min []  Other:    Skin assessment post-treatment:   Redness, no adverse reactions        Therapeutic Procedures:     Tx Min Billable or 1:1 Min (if diff from Tx Min) Procedure, Rationale, Specifics   20  36883 Therapeutic Exercise (timed):  increase ROM, strength, coordination, balance, and proprioception to improve patient's ability to progress to PLOF and address remaining

## 2023-10-31 ENCOUNTER — HOSPITAL ENCOUNTER (OUTPATIENT)
Facility: HOSPITAL | Age: 63
Setting detail: RECURRING SERIES
Discharge: HOME | End: 2023-11-03
Payer: MEDICARE

## 2023-10-31 PROCEDURE — 97140 MANUAL THERAPY 1/> REGIONS: CPT

## 2023-10-31 PROCEDURE — 97110 THERAPEUTIC EXERCISES: CPT

## 2023-10-31 PROCEDURE — 97530 THERAPEUTIC ACTIVITIES: CPT

## 2023-10-31 NOTE — PROGRESS NOTES
address functional mobility deficits, analyze and address ROM deficits, analyze and address strength deficits, analyze and address soft tissue restrictions, analyze and cue for proper movement patterns, analyze and modify for postural abnormalities, and instruct in home and community integration to address functional deficits and attain remaining goals. Progress toward goals / Updated goals:  []  See Progress Note/Recertification    Short Term Goals: To be accomplished in 4 weeks  Pt will be independent and compliant with HEP  PN: Patient reports compliance with HEP everyday. 2. Improve B knee flexion AROM to at least 120 deg to improve mobility for transfers  PN: R 118, L 110 deg. Current: Left knee ROM- 113 degs; Right knee ROM- 125 degs. 10/31/2023    Long Term Goals: To be accomplished in 8 weeks  Pt will improve 5x STS test by at least 10 seconds to indicate improved function mobility  PN: 15 seconds. Progressing     2. Improve B hip abd MMT to at least 4-/5 to improve LE strength/stability for prolonged walking  PN: R 3+/5, L 3+/5. Progressing     3. Improve FOTO Score to >/= 59/100 to indicate improved function  PN: 52 points.  Progressing    Next PN/ RC due 11/10/2023   Auth due 16 visits exp 1/10/2024    PLAN  - Continue Plan of Care  - Upgrade activities as tolerated    Santo Duran PTA    10/31/2023    7:46 AM    Future Appointments   Date Time Provider [address]   10/31/2023  8:50 AM Santo Duran PTA [facility]   11/3/2023  9:30 AM Santo Duran PTA [facility] [address]   11/6/2023 10:10 AM Santo Duran PTA [facility] [address]   11/10/2023  2:10 PM Santo Duran PTA [facility] [address]   11/13/2023 10:10 AM Santo Duran PTA [facility] [address]   11/17/2023 10:10 AM Santo Duran PTA [facility] [address]   1/8/2024  2:00 PM Fatemeh Blanchard PA-C [facility] [place] Sched   8/9/2024  9:20 AM Yash Grande MD [facility] AMB

## 2023-11-03 ENCOUNTER — HOSPITAL ENCOUNTER (OUTPATIENT)
Facility: HOSPITAL | Age: 63
Setting detail: RECURRING SERIES
Discharge: HOME | End: 2023-11-06
Payer: MEDICARE

## 2023-11-03 ENCOUNTER — TELEPHONE (OUTPATIENT)
Facility: HOSPITAL | Age: 63
End: 2023-11-03

## 2023-11-03 PROCEDURE — 97140 MANUAL THERAPY 1/> REGIONS: CPT

## 2023-11-03 PROCEDURE — 97530 THERAPEUTIC ACTIVITIES: CPT

## 2023-11-03 PROCEDURE — 97110 THERAPEUTIC EXERCISES: CPT

## 2023-11-03 NOTE — PROGRESS NOTES
PHYSICAL / OCCUPATIONAL THERAPY - DAILY TREATMENT NOTE (updated )    Patient Name: Niki Nuñez    Date: 11/3/2023    : 1960  Insurance: Payor: Ashley Bowels / Plan: Ashley Bowels / Product Type: *No Product type* /      Patient  verified Yes     Visit #   Current / Total 10 16   Time   In / Out 930 am 1040 am    Pain   In / Out 0/10  0/10   Subjective Functional Status/Changes: Patient states that he continues to have increased B knee pain at night. Patient reports that his pain is better now. TREATMENT AREA =  Pain in left knee [M25.562]  Pain in right knee [M25.561]    OBJECTIVE    Modalities Rationale:     decrease edema, decrease inflammation, and decrease pain to improve patient's ability to progress to PLOF and address remaining functional goals. min [] Estim Unattended, type/location:                                      []  w/ice    []  w/heat    min [] Estim Attended, type/location:                                     []  w/US     []  w/ice    []  w/heat    []  TENS insruct      min []  Mechanical Traction: type/lbs                   []  pro   []  sup   []  int   []  cont    []  before manual    []  after manual    min []  Ultrasound, settings/location:     10 min  unbill []  Ice     [x]  Heat    location/position: Reclined to B knees    min []  Paraffin,  details:     min []  Vasopneumatic Device, press/temp:      min []  Alyssa Blue / [place]: If using vaso (only need to measure limb vaso being performed on)      pre-treatment girth :      post-treatment girth :       measured at (landmark location)     min []  Other:    Skin assessment post-treatment:   Redness, no adverse reactions        Therapeutic Procedures:     Tx Min Billable or 1:1 Min (if diff from Tx Min) Procedure, Rationale, Specifics   28  05153 Therapeutic Exercise (timed):  increase ROM, strength, coordination, balance, and proprioception to improve patient's ability to progress to PLOF and address remaining

## 2023-11-06 ENCOUNTER — HOSPITAL ENCOUNTER (OUTPATIENT)
Facility: HOSPITAL | Age: 63
Setting detail: RECURRING SERIES
Discharge: HOME | End: 2023-11-09
Payer: MEDICARE

## 2023-11-06 PROCEDURE — 97110 THERAPEUTIC EXERCISES: CPT

## 2023-11-06 PROCEDURE — 97530 THERAPEUTIC ACTIVITIES: CPT

## 2023-11-06 PROCEDURE — 97140 MANUAL THERAPY 1/> REGIONS: CPT

## 2023-11-06 NOTE — PROGRESS NOTES
remaining deficits. Patient will continue to benefit from skilled PT / OT services to modify and progress therapeutic interventions, analyze and address functional mobility deficits, analyze and address ROM deficits, analyze and address strength deficits, analyze and address soft tissue restrictions, analyze and cue for proper movement patterns, analyze and modify for postural abnormalities, and instruct in home and community integration to address functional deficits and attain remaining goals. If you have any questions/comments please contact us directly at [phone]. Thank you for allowing us to assist in the care of your patient. Certification Period: 11/7/2023-12/7/2023  Reporting Period (date from last assessment to current assessment): 10/9/2023- 11/6/2023    Kris Nip, PTA       11/6/2023       8:29 AM      ___ I have read the above report and request that my patient continue as recommended.   ___ I have read the above report and request that my patient continue therapy with the following changes/special instructions: ________________________________________________   ___ I have read the above report and request that my patient be discharged from therapy. Physician's Signature:_________________________   DATE:_________   TIME:________                           Deepa MD [place]    ** Signature, Date and Time must be completed for valid certification **  Please sign and fax to [address] [place] [address] [phone].   Thank you

## 2023-11-06 NOTE — PROGRESS NOTES
will be independent and compliant with HEP  PN: Patient reports compliance with HEP everyday. Current: Patient reports doing HEP at least once a day . MET     2. Improve B knee flexion AROM to at least 120 deg to improve mobility for transfers  PN: R 118, L 110 deg. Current: Left knee ROM- 117 degs; Right knee ROM- 125 degs. Progressing     Long Term Goals: To be accomplished in 8 weeks  Pt will improve 5x STS test by at least 10 seconds to indicate improved function mobility  PN: 15 seconds. Current: 10 seconds. MET    2. Improve B hip abd MMT to at least 4-/5 to improve LE strength/stability for prolonged walking  PN: R 3+/5, L 3+/5. Progressing   Current: R 4/5, L 4/5. MET     3. Improve FOTO Score to >/= 59/100 to indicate improved function  PN: 52 points.    Current: 55 points Progressing     Next PN/ RC due 11/10/2023   Auth due 16 visits exp 1/10/2024    PLAN  - Continue Plan of Care  - Upgrade activities as tolerated    Pattricia Gain, PTA    11/6/2023    10:39 AM    Future Appointments   Date Time Provider [address]   11/13/2023 10:10 AM Pattricia Gain, PTA [facility]   11/17/2023 10:10 AM Pattricia Gain, PTA [facility]   1/8/2024  2:00 PM Feli Conway PA-C [facility] [place] Sched   8/9/2024  9:20 AM Roya Bonner MD [facility] BS AMB

## 2023-11-10 ENCOUNTER — APPOINTMENT (OUTPATIENT)
Facility: HOSPITAL | Age: 63
End: 2023-11-10
Payer: MEDICARE

## 2023-11-13 ENCOUNTER — HOSPITAL ENCOUNTER (OUTPATIENT)
Facility: HOSPITAL | Age: 63
Setting detail: RECURRING SERIES
Discharge: HOME | End: 2023-11-16
Payer: MEDICARE

## 2023-11-13 PROCEDURE — 97112 NEUROMUSCULAR REEDUCATION: CPT

## 2023-11-13 PROCEDURE — 97140 MANUAL THERAPY 1/> REGIONS: CPT

## 2023-11-13 PROCEDURE — 97110 THERAPEUTIC EXERCISES: CPT

## 2023-11-13 PROCEDURE — 97530 THERAPEUTIC ACTIVITIES: CPT

## 2023-11-13 NOTE — PROGRESS NOTES
PHYSICAL / OCCUPATIONAL THERAPY - DAILY TREATMENT NOTE (updated )    Patient Name: Wilma Calderon    Date: 2023    : 1960  Insurance: Payor: Michelle Garcia / Plan: Michelle Garcia / Product Type: *No Product type* /      Patient  verified Yes     Visit #   Current / Total 1 8   Time   In / Out 1000 am  1104 am    Pain   In / Out 0/10  0/10    Subjective Functional Status/Changes: \"My knees are feeling like knees today. \"      TREATMENT AREA =  Pain in left knee [M25.562]  Pain in right knee [M25.561]    OBJECTIVE    Modalities Rationale:     decrease pain and increase tissue extensibility to improve patient's ability to progress to PLOF and address remaining functional goals. min [] Estim Unattended, type/location:                                      []  w/ice    []  w/heat    min [] Estim Attended, type/location:                                     []  w/US     []  w/ice    []  w/heat    []  TENS insruct      min []  Mechanical Traction: type/lbs                   []  pro   []  sup   []  int   []  cont    []  before manual    []  after manual    min []  Ultrasound, settings/location:     10 min  unbill []  Ice     [x]  Heat    location/position: Reclined to B knees    min []  Paraffin,  details:     min []  Vasopneumatic Device, press/temp:     min []  Sturgeon Riddles / [place]: If using vaso (only need to measure limb vaso being performed on)      pre-treatment girth :       post-treatment girth :       measured at (landmark location) :      min []  Other:    Skin assessment post-treatment:   Intact      Therapeutic Procedures: Tx Min Billable or 1:1 Min (if diff from Tx Min) Procedure, Rationale, Specifics   20  01659 Therapeutic Exercise (timed):  increase ROM, strength, coordination, balance, and proprioception to improve patient's ability to progress to PLOF and address remaining functional goals.  (see flow sheet as applicable)     Details if applicable:       8  32548 Neuromuscular

## 2023-11-17 ENCOUNTER — HOSPITAL ENCOUNTER (OUTPATIENT)
Facility: HOSPITAL | Age: 63
Setting detail: RECURRING SERIES
Discharge: HOME | End: 2023-11-20
Payer: MEDICARE

## 2023-11-17 PROCEDURE — 97140 MANUAL THERAPY 1/> REGIONS: CPT

## 2023-11-17 PROCEDURE — 97110 THERAPEUTIC EXERCISES: CPT

## 2023-11-17 PROCEDURE — 97530 THERAPEUTIC ACTIVITIES: CPT

## 2023-11-17 NOTE — PROGRESS NOTES
PHYSICAL / OCCUPATIONAL THERAPY - DAILY TREATMENT NOTE (updated )    Patient Name: Ijeoma Navarro    Date: 2023    : 1960  Insurance: Payor: Rakel Gonzales / Plan: Rakel Gonzales / Product Type: *No Product type* /      Patient  verified Yes     Visit #   Current / Total 2 8   Time   In / Out 1010 am  1105 am    Pain   In / Out 0/10  0/10    Subjective Functional Status/Changes: Patient reports that his knee are feeling good this morning but they were hurting pretty bad last night. TREATMENT AREA =  Pain in left knee [M25.562]  Pain in right knee [M25.561]    OBJECTIVE    Modalities Rationale:     decrease pain and increase tissue extensibility to improve patient's ability to progress to PLOF and address remaining functional goals. min [] Estim Unattended, type/location:                                      []  w/ice    []  w/heat    min [] Estim Attended, type/location:                                     []  w/US     []  w/ice    []  w/heat    []  TENS insruct      min []  Mechanical Traction: type/lbs                   []  pro   []  sup   []  int   []  cont    []  before manual    []  after manual    min []  Ultrasound, settings/location:     10 min  unbill []  Ice     [x]  Heat    location/position: Reclined to B knees    min []  Paraffin,  details:     min []  Vasopneumatic Device, press/temp:     min []  Luisana Contreras / Alberto Hay: If using vaso (only need to measure limb vaso being performed on)      pre-treatment girth :       post-treatment girth :       measured at (landmark location) :      min []  Other:    Skin assessment post-treatment:   Intact      Therapeutic Procedures: Tx Min Billable or 1:1 Min (if diff from Tx Min) Procedure, Rationale, Specifics   20  50143 Therapeutic Exercise (timed):  increase ROM, strength, coordination, balance, and proprioception to improve patient's ability to progress to PLOF and address remaining functional goals.  (see flow sheet as

## 2023-11-20 ENCOUNTER — HOSPITAL ENCOUNTER (OUTPATIENT)
Facility: HOSPITAL | Age: 63
Setting detail: RECURRING SERIES
Discharge: HOME | End: 2023-11-23
Payer: MEDICARE

## 2023-11-20 PROCEDURE — 97110 THERAPEUTIC EXERCISES: CPT

## 2023-11-20 PROCEDURE — 97530 THERAPEUTIC ACTIVITIES: CPT

## 2023-11-20 PROCEDURE — 97140 MANUAL THERAPY 1/> REGIONS: CPT

## 2023-11-21 DIAGNOSIS — F41.9 ANXIETY: ICD-10-CM

## 2023-11-22 RX ORDER — DIAZEPAM 10 MG/1
TABLET ORAL
Qty: 45 TABLET | Refills: 0 | Status: SHIPPED | OUTPATIENT
Start: 2023-11-22 | End: 2023-12-22

## 2023-11-22 NOTE — TELEPHONE ENCOUNTER
[facility]22[facility]2023    The last fill date for Diazepam 10 Mg Tablet was 10/12/2023 for a 30 d/s qty 45      Last UDS: not on file     CSA Last signed: not on file         PCP: Joel Stahl MD    Last appt:  8/8/2023  Future Appointments   Date Time Provider [address]   11/27/2023  8:50 AM Nevin Paula, PTA [facility]   12/1/2023  8:50 AM Nevin Paula, PTA [facility]   1/8/2024  2:00 PM Janeth Lawler PA-C [facility]   8/9/2024  9:20 AM Romi Johnson MD [facility] BS AMB       Requested Prescriptions     Pending Prescriptions Disp Refills    diazePAM (VALIUM) 10 MG tablet [Pharmacy Med Name: diazePAM 10 MG Oral Tablet] 45 tablet 0     Sig: TAKE 1/2 (ONE-HALF) TABLET BY MOUTH EVERY 8 HOURS AS NEEDED FOR ANXIETY FOR 30 DAYS MAX  DAILY  AMOUNT  1.5  TABLETS

## 2023-11-27 ENCOUNTER — HOSPITAL ENCOUNTER (OUTPATIENT)
Facility: HOSPITAL | Age: 63
Setting detail: RECURRING SERIES
Discharge: HOME | End: 2023-11-30
Payer: MEDICARE

## 2023-11-27 PROCEDURE — 97140 MANUAL THERAPY 1/> REGIONS: CPT

## 2023-11-27 PROCEDURE — 97530 THERAPEUTIC ACTIVITIES: CPT

## 2023-11-27 PROCEDURE — 97110 THERAPEUTIC EXERCISES: CPT

## 2023-11-27 NOTE — PROGRESS NOTES
(example: do not include dry needle or estim unattended, both untimed codes, in totals to left)  8-22 min = 1 unit; 23-37 min = 2 units; 38-52 min = 3 units; 53-67 min = 4 units; 68-82 min = 5 units   Total Total     [x]  Patient Education billed concurrently with other procedures   [x] Review HEP    [] Progressed/Changed HEP, detail:    [] Other detail:       Objective Information/Functional Measures/Assessment    Left knee ROM- 117 degs  Right knee ROM- 127 degs    Patient continues to present to PT with no reports of B knee pain but reports of difficulty getting up from seated position and getting out of the car without assistance. Progressed weight of leg press in order to further assist with increasing B knee and strength for functional tasks. Patient tolerated exercises well with no increase in B knee pain. B knee ROM continues to improve. Patient's B knee ROM continues to improve with PT. No increase in B knee pain was reported at the end of today's session. Patient to be discharged next session. Will prepare updated HEP and discharge instructions for patient next visit. Patient will continue to benefit from skilled PT / OT services to modify and progress therapeutic interventions, analyze and address functional mobility deficits, analyze and address ROM deficits, analyze and address strength deficits, analyze and address soft tissue restrictions, analyze and cue for proper movement patterns, analyze and modify for postural abnormalities, and instruct in home and community integration to address functional deficits and attain remaining goals. Progress toward goals / Updated goals:  []  See Progress Note/Recertification    1. Improve B knee flexion AROM to at least 120 deg to improve mobility for transfers  Re-cert: Left knee ROM- 117 degs; Right knee ROM- 125 degs. Current:Left knee ROM- 117 degs; Right knee ROM- 127 degs. 11/27/2023     2.  Improve FOTO Score to >/= 59/100 to indicate improved

## 2023-12-01 ENCOUNTER — HOSPITAL ENCOUNTER (OUTPATIENT)
Facility: HOSPITAL | Age: 63
Setting detail: RECURRING SERIES
Discharge: HOME | End: 2023-12-04
Payer: MEDICARE

## 2023-12-01 PROCEDURE — 97140 MANUAL THERAPY 1/> REGIONS: CPT

## 2023-12-01 PROCEDURE — 97530 THERAPEUTIC ACTIVITIES: CPT

## 2023-12-01 PROCEDURE — 97110 THERAPEUTIC EXERCISES: CPT

## 2023-12-01 NOTE — PROGRESS NOTES
Physical Therapy Discharge Instructions      [facility]  [address], [address]  [phone] fax    Patient: Vishal Ruvalcaba  : 1960      Continue Home Exercise Program 1-2 times per day for 3 weeks, then decrease to 3 times per week      Continue with    [x] Ice  as needed 1-2 times per day     [x] Heat           Follow up with MD:     [] Upon completion of therapy     [x] As needed      Recommendations:     []   Return to activity with home program    [x]   Return to activity with the following modifications:       []Post Rehab Program    []Join Independent aquatic program     [x]Return to/join local gym      Additional Comments: Please contact office with any questions of concerns.        Orquidea Magdaleno PTA 2023 9:09 AM

## 2023-12-01 NOTE — PROGRESS NOTES
PT DISCHARGE DAILY NOTE AND SUMMARY     Date:2023            Patient Name: Ijeoma Navarro : 1960   Medical   Diagnosis: Pain in left knee [M25.562]  Pain in right knee [M25.561] Treatment Diagnosis:  M25.561  RIGHT KNEE PAIN and M25.562  LEFT KNEE PAIN     Onset Date: L TKA DOS 2023  R TKA DOS 3/26/2023       Referral Source: Joselin Lockett MD Start of Care [facility]): 2023   Prior Hospitalization: See medical history Provider #: [id number]   Prior Level of Function: Lives in [address] [place] home; prior to surgery tolerated unlimited walking, yard work, electric bike without pain/limitations   Comorbidities: PMHx: HTN, CVA ; PSHx: B knee scopes (, )     Insurance: Payor: [employer] / Plan: Rakel Harden / Product Type: *No Product type* /      Visits from Start of Care: 16  Missed Visits: 1    Reporting Period : 2023 to 2023     Patient  verified yes     Visit #   Current / Total 5 8   Time   In / Out 850 am  936 pm    Pain   In / Out 0/10  0/10    Subjective Functional Status/Changes: Patient reports that his knees are stiff today. TREATMENT AREA =  Pain in left knee [M25.562]  Pain in right knee [M25.561]    OBJECTIVE         Therapeutic Procedures: Tx Min Billable or 1:1 Min (if diff from Tx Min) Procedure, Rationale, Specifics   14  94015 Therapeutic Exercise (timed):  increase ROM, strength, coordination, balance, and proprioception to improve patient's ability to progress to PLOF and address remaining functional goals. (see flow sheet as applicable)     Details if applicable:       20  54800 Therapeutic Activity (timed):  use of dynamic activities replicating functional movements to increase ROM, strength, coordination, balance, and proprioception in order to improve patient's ability to progress to PLOF and address remaining functional goals.   (see flow sheet as applicable)     Details if applicable:     12  27039 Manual Therapy (timed):  decrease pain,

## 2023-12-11 ENCOUNTER — OFFICE VISIT (OUTPATIENT)
Age: 63
End: 2023-12-11
Payer: MEDICARE

## 2023-12-11 DIAGNOSIS — M25.562 PAIN IN BOTH KNEES, UNSPECIFIED CHRONICITY: Primary | ICD-10-CM

## 2023-12-11 DIAGNOSIS — M25.561 PAIN IN BOTH KNEES, UNSPECIFIED CHRONICITY: Primary | ICD-10-CM

## 2023-12-11 PROCEDURE — 1124F ACP DISCUSS-NO DSCNMKR DOCD: CPT | Performed by: ORTHOPAEDIC SURGERY

## 2023-12-11 PROCEDURE — 99212 OFFICE O/P EST SF 10 MIN: CPT | Performed by: ORTHOPAEDIC SURGERY

## 2023-12-11 NOTE — PROGRESS NOTES
Name: Mike Valdez    : 1960     REHABILITATION [facility] [address] [place] [address] AND SPORTS MEDICINE  [address] [place] [address], [address] [id number]  Dept: [phone]  Dept Fax: [phone]     Chief Complaint   Patient presents with    Knee Pain        There were no vitals taken for this visit.      Allergies   Allergen Reactions    Atorvastatin Myalgia    Duloxetine Other (See Comments)     Gi upset    Erythromycin Diarrhea    Escitalopram Oxalate Other (See Comments)     Gi upset    Pravastatin Myalgia    Simvastatin Myalgia    Codeine Other (See Comments) and Nausea And Vomiting     Other reaction(s): other/intolerance      Penicillins Itching and Rash     Rash around collar area      Sulfa Antibiotics Rash        Current Outpatient Medications   Medication Sig Dispense Refill    diazePAM (VALIUM) 10 MG tablet TAKE 1/2 (ONE-HALF) TABLET BY MOUTH EVERY 8 HOURS AS NEEDED FOR ANXIETY FOR 30 DAYS MAX  DAILY  AMOUNT  1.5  TABLETS 45 tablet 0    rosuvastatin (CRESTOR) 20 MG tablet Take 1 tablet by mouth nightly 90 tablet 3    omeprazole (PRILOSEC) 40 MG delayed release capsule Take 1 capsule by mouth every morning (before breakfast) 90 capsule 3    lisinopril (PRINIVIL;ZESTRIL) 2.5 MG tablet Take 1 tablet by mouth daily high blood pressure 90 tablet 3    levothyroxine (SYNTHROID) 100 MCG tablet TAKE 1 TABLET BY MOUTH ONCE DAILY BEFORE BREAKFAST 90 tablet 3    rosuvastatin (CRESTOR) 20 MG tablet Take 1 tablet by mouth at bedtime 90 tablet 3    fluticasone (FLONASE) 50 MCG/ACT nasal spray Use 2 spray(s) in each nostril once daily 48 g 0    Alum Hydroxide-Mag Carbonate (GAVISCON PO) Take 1 tablet by mouth at bedtime Magnesium carbonate 105 mg- aluminum hydroxcide  160 mg      docusate sodium (COLACE) 100 MG capsule Take 2 capsules by mouth at bedtime      ibuprofen (ADVIL;MOTRIN) 200 MG tablet Take 2 tablets by mouth every 6 hours as needed for Pain      acetaminophen (TYLENOL) 500

## 2023-12-30 DIAGNOSIS — F41.9 ANXIETY: ICD-10-CM

## 2024-01-02 RX ORDER — DIAZEPAM 10 MG/1
TABLET ORAL
Qty: 45 TABLET | Refills: 0 | Status: SHIPPED | OUTPATIENT
Start: 2024-01-02 | End: 2024-02-01

## 2024-02-05 DIAGNOSIS — F41.9 ANXIETY: ICD-10-CM

## 2024-02-05 NOTE — TELEPHONE ENCOUNTER
[facility]5/2024    The last fill date for Diazepam 10 Mg Tablet  was 1/2/2024 for a 30 d/s qty 45      Last UDS: not on file     CSA Last signed: not on file         PCP: William Cain MD    Last appt: 8/8/2023  Future Appointments   Date Time Provider Department Center   8/9/2024  9:20 AM William Cain MD [facility] BS AMB       Requested Prescriptions     Pending Prescriptions Disp Refills    diazePAM (VALIUM) 10 MG tablet [Pharmacy Med Name: diazePAM 10 MG Oral Tablet] 45 tablet 0     Sig: TAKE 1/2 (ONE-HALF) TABLET BY MOUTH EVERY 8 HOURS AS NEEDED FOR ANXIETY . DO NOT EXCEED 1 & 1/2 (ONE & ONE-HALF) PER 24 HOURS

## 2024-02-07 RX ORDER — DIAZEPAM 10 MG/1
TABLET ORAL
Qty: 45 TABLET | Refills: 0 | Status: SHIPPED | OUTPATIENT
Start: 2024-02-07 | End: 2024-03-08

## 2024-02-09 DIAGNOSIS — R73.01 IFG (IMPAIRED FASTING GLUCOSE): ICD-10-CM

## 2024-03-30 DIAGNOSIS — F41.9 ANXIETY: ICD-10-CM

## 2024-04-02 NOTE — TELEPHONE ENCOUNTER
[facility] 4/2/2024    The last fill date for Diazepam 10 Mg Tablet  was 2/7/2024 for a 30 d/s qty 45      Last UDS: not on file    CSA Last signed: not on file         PCP: William Cain MD    Last appt:  8/8/2023  Future Appointments   Date Time Provider Department Center   8/9/2024  9:20 AM William Cain MD [facility] BS AMB       Requested Prescriptions     Pending Prescriptions Disp Refills    diazePAM (VALIUM) 10 MG tablet [Pharmacy Med Name: diazePAM 10 MG Oral Tablet] 45 tablet 0     Sig: TAKE 1/2 TABLET BY MOUTH EVERY 8 HOURS AS NEEDED FOR ANXIETY. DO NOT EXCEED 1 & 1/2 TABLETS PER 24 HOURS.

## 2024-04-03 RX ORDER — DIAZEPAM 10 MG/1
TABLET ORAL
Qty: 45 TABLET | Refills: 0 | Status: SHIPPED | OUTPATIENT
Start: 2024-04-03 | End: 2024-05-13

## 2024-04-19 ENCOUNTER — TELEPHONE (OUTPATIENT)
Age: 64
End: 2024-04-19

## 2024-04-19 RX ORDER — CLINDAMYCIN HYDROCHLORIDE 300 MG/1
600 CAPSULE ORAL ONCE AS NEEDED
Qty: 2 CAPSULE | Refills: 3 | Status: SHIPPED | OUTPATIENT
Start: 2024-04-19

## 2024-05-11 DIAGNOSIS — F41.9 ANXIETY: ICD-10-CM

## 2024-05-13 RX ORDER — DIAZEPAM 10 MG/1
TABLET ORAL
Qty: 45 TABLET | Refills: 0 | Status: SHIPPED | OUTPATIENT
Start: 2024-05-13 | End: 2024-06-12

## 2024-05-13 NOTE — TELEPHONE ENCOUNTER
Pls call pt to have csa signed and get uds - this is last refill until done       Diagnosis Orders   1. Anxiety  diazePAM (VALIUM) 10 MG tablet

## 2024-06-25 ENCOUNTER — OFFICE VISIT (OUTPATIENT)
Facility: CLINIC | Age: 64
End: 2024-06-25
Payer: MEDICARE

## 2024-06-25 VITALS
DIASTOLIC BLOOD PRESSURE: 84 MMHG | SYSTOLIC BLOOD PRESSURE: 146 MMHG | HEIGHT: 73 IN | WEIGHT: 277 LBS | HEART RATE: 82 BPM | OXYGEN SATURATION: 96 % | RESPIRATION RATE: 16 BRPM | BODY MASS INDEX: 36.71 KG/M2 | TEMPERATURE: 98.3 F

## 2024-06-25 DIAGNOSIS — R73.01 IFG (IMPAIRED FASTING GLUCOSE): ICD-10-CM

## 2024-06-25 DIAGNOSIS — I10 ESSENTIAL HYPERTENSION: ICD-10-CM

## 2024-06-25 DIAGNOSIS — M15.9 PRIMARY OSTEOARTHRITIS INVOLVING MULTIPLE JOINTS: ICD-10-CM

## 2024-06-25 DIAGNOSIS — H25.9 SENILE CATARACT OF RIGHT EYE, UNSPECIFIED AGE-RELATED CATARACT TYPE: ICD-10-CM

## 2024-06-25 DIAGNOSIS — Z01.818 PREOP EXAM FOR INTERNAL MEDICINE: Primary | ICD-10-CM

## 2024-06-25 DIAGNOSIS — R60.9 EDEMA, UNSPECIFIED TYPE: ICD-10-CM

## 2024-06-25 PROBLEM — E66.01 SEVERE OBESITY (BMI 35.0-35.9 WITH COMORBIDITY) (HCC): Status: ACTIVE | Noted: 2017-10-18

## 2024-06-25 PROCEDURE — 3079F DIAST BP 80-89 MM HG: CPT | Performed by: INTERNAL MEDICINE

## 2024-06-25 PROCEDURE — 99214 OFFICE O/P EST MOD 30 MIN: CPT | Performed by: INTERNAL MEDICINE

## 2024-06-25 PROCEDURE — 1124F ACP DISCUSS-NO DSCNMKR DOCD: CPT | Performed by: INTERNAL MEDICINE

## 2024-06-25 PROCEDURE — 3077F SYST BP >= 140 MM HG: CPT | Performed by: INTERNAL MEDICINE

## 2024-06-25 RX ORDER — POTASSIUM CHLORIDE 750 MG/1
10 TABLET, EXTENDED RELEASE ORAL DAILY PRN
Qty: 30 TABLET | Refills: 5 | Status: SHIPPED | OUTPATIENT
Start: 2024-06-25

## 2024-06-25 RX ORDER — FUROSEMIDE 20 MG/1
20 TABLET ORAL DAILY PRN
Qty: 30 TABLET | Refills: 5 | Status: SHIPPED | OUTPATIENT
Start: 2024-06-25

## 2024-06-25 ASSESSMENT — PATIENT HEALTH QUESTIONNAIRE - PHQ9
SUM OF ALL RESPONSES TO PHQ9 QUESTIONS 1 & 2: 0
SUM OF ALL RESPONSES TO PHQ QUESTIONS 1-9: 0
1. LITTLE INTEREST OR PLEASURE IN DOING THINGS: NOT AT ALL
SUM OF ALL RESPONSES TO PHQ QUESTIONS 1-9: 0
SUM OF ALL RESPONSES TO PHQ QUESTIONS 1-9: 0
2. FEELING DOWN, DEPRESSED OR HOPELESS: NOT AT ALL
SUM OF ALL RESPONSES TO PHQ QUESTIONS 1-9: 0

## 2024-06-25 NOTE — PROGRESS NOTES
63 y.o. male who presents for med clearance    He will be undergoing RIGHT cataract surgery by Dr Israel next week    He successfully underwent bilat TKR by Dr KRISTOPHER Ray spring 2023.  However, he continued to have problems so switched over care to Dr Gabriel.  He's getting therapy, wearing a brace, and they are possibly doing a procedure at some point int he future    No cardiovascular complaints.  Exercise is limited by above issues.  He has been seeing a bit of swelling.  We apparently had him on lasix years ago (records not available).  Had venous duplex by Dr KRISTOPHER Ray in 2/23.  He is interested in at least trying lasix again; we discussed going to vascular but he wanted to hold off for now    The sleep apnea is controlled on cpap     No gi or gu issues, he is seeing urology    Past Medical History:   Diagnosis Date    Allergic rhinitis, seasonal     Anxiety     JUDD-7 was 11/21 from 7/11    BPH with obstruction/lower urinary tract symptoms 2023    Colon polyps 2008    Dr. Finney    COVID-19 vaccine dose declined 08/2023    boosters    Degenerative arthritis of cervical spine 2013    on mri    Depression     PHQ-9 was 7/27 from (7/11); 10/27 from (5/15); lexapro intol; effexor briefly 2020    Diverticulosis     Dyslipidemia     intol lipitor, prava, zocor; tolerated mevacor    FHx: heart disease     GERD (gastroesophageal reflux disease)     egd 10/22 Dr Melissa mild reflux changes, neg dysplasia or eoe    H/O cardiovascular stress test     ETT neg (5/16)    H/O echocardiogram     (1/18) nl lv, ef 60%, mild dd, mild/mod LVH, tr AI, aortic root 4cm, well seated pfo closure device Dr Moffettigrmuriel    Hearing loss 2016    right sided; s/p myringotomy Dr Cisneros; now seeing diff ENT    Hemorrhoid     Hypertension 03/22/2018    component of WHITE COAT    Hypogonadism male 2011    Dr Richards; dc'ed replacement tx 2015    Hypothyroidism     IFG (impaired fasting glucose) 2011    Nephrolithiasis     Obesity (BMI 30-39.9)     peak

## 2024-06-25 NOTE — PROGRESS NOTES
Sven Montes presents today for   Chief Complaint   Patient presents with    Pre-op Exam       \"Have you been to the ER, urgent care clinic since your last visit?  Hospitalized since your last visit?\"    NO    “Have you seen or consulted any other health care providers outside of [facility] since your last visit?”    NO

## 2024-06-26 DIAGNOSIS — F41.9 ANXIETY: ICD-10-CM

## 2024-07-02 RX ORDER — DIAZEPAM 10 MG/1
TABLET ORAL
Qty: 45 TABLET | Refills: 0 | Status: SHIPPED | OUTPATIENT
Start: 2024-07-02 | End: 2024-08-01

## 2024-07-29 ENCOUNTER — TELEPHONE (OUTPATIENT)
Facility: CLINIC | Age: 64
End: 2024-07-29

## 2024-07-29 DIAGNOSIS — E03.4 HYPOTHYROIDISM DUE TO ACQUIRED ATROPHY OF THYROID: ICD-10-CM

## 2024-07-29 DIAGNOSIS — E78.5 DYSLIPIDEMIA: ICD-10-CM

## 2024-07-29 DIAGNOSIS — R73.01 IFG (IMPAIRED FASTING GLUCOSE): Primary | ICD-10-CM

## 2024-07-29 NOTE — TELEPHONE ENCOUNTER
Please advise what labs are needed for patient.   Pt would like to have the orders faxed to [facility] to be completed tomorrow 7/30/24

## 2024-07-30 DIAGNOSIS — K21.9 GASTROESOPHAGEAL REFLUX DISEASE WITHOUT ESOPHAGITIS: ICD-10-CM

## 2024-07-31 RX ORDER — LISINOPRIL 2.5 MG/1
2.5 TABLET ORAL DAILY
Qty: 90 TABLET | Refills: 3 | Status: SHIPPED | OUTPATIENT
Start: 2024-07-31

## 2024-07-31 RX ORDER — ROSUVASTATIN CALCIUM 20 MG/1
20 TABLET, COATED ORAL NIGHTLY
Qty: 90 TABLET | Refills: 3 | Status: SHIPPED | OUTPATIENT
Start: 2024-07-31

## 2024-07-31 RX ORDER — LEVOTHYROXINE SODIUM 0.1 MG/1
TABLET ORAL
Qty: 90 TABLET | Refills: 3 | Status: SHIPPED | OUTPATIENT
Start: 2024-07-31

## 2024-07-31 RX ORDER — OMEPRAZOLE 40 MG/1
40 CAPSULE, DELAYED RELEASE ORAL
Qty: 90 CAPSULE | Refills: 3 | Status: SHIPPED | OUTPATIENT
Start: 2024-07-31

## 2024-08-01 LAB
A/G RATIO: 1.9 RATIO (ref 1.1–2.6)
ALBUMIN: 4.4 G/DL (ref 3.5–5)
ALP BLD-CCNC: 60 U/L (ref 40–125)
ALT SERPL-CCNC: 18 U/L (ref 5–40)
ANION GAP SERPL CALCULATED.3IONS-SCNC: 9 MMOL/L (ref 3–15)
AST SERPL-CCNC: 20 U/L (ref 10–37)
BASOPHILS ABSOLUTE: 0 K/UL (ref 0–0.2)
BASOPHILS RELATIVE PERCENT: 0 % (ref 0–2)
BILIRUB SERPL-MCNC: 0.3 MG/DL (ref 0.2–1.2)
BUN BLDV-MCNC: 16 MG/DL (ref 6–22)
CALCIUM SERPL-MCNC: 8.9 MG/DL (ref 8.4–10.5)
CHLORIDE BLD-SCNC: 105 MMOL/L (ref 98–110)
CHOLESTEROL, TOTAL: 131 MG/DL (ref 110–200)
CHOLESTEROL/HDL RATIO: 2.6 (ref 0–5)
CO2: 27 MMOL/L (ref 20–32)
CREAT SERPL-MCNC: 0.8 MG/DL (ref 0.8–1.6)
EOSINOPHIL # BLD: 3 % (ref 0–6)
EOSINOPHILS ABSOLUTE: 0.2 K/UL (ref 0–0.5)
ESTIMATED AVERAGE GLUCOSE: 125 MG/DL (ref 91–123)
GFR, ESTIMATED: >60 ML/MIN/1.73 SQ.M.
GLOBULIN: 2.3 G/DL (ref 2–4)
GLUCOSE: 103 MG/DL (ref 70–99)
HBA1C MFR BLD: 6 % (ref 4.8–5.6)
HCT VFR BLD CALC: 41.1 % (ref 37.8–52.2)
HDLC SERPL-MCNC: 51 MG/DL
HEMOGLOBIN: 13.1 G/DL (ref 12.6–17.1)
LDL CHOLESTEROL: 61 MG/DL (ref 50–99)
LDL/HDL RATIO: 1.2
LYMPHOCYTES # BLD: 23 % (ref 20–45)
LYMPHOCYTES ABSOLUTE: 1.2 K/UL (ref 1–4.8)
MCH RBC QN AUTO: 30 PG (ref 26–34)
MCHC RBC AUTO-ENTMCNC: 32 G/DL (ref 31–36)
MCV RBC AUTO: 94 FL (ref 80–95)
MONOCYTES ABSOLUTE: 0.5 K/UL (ref 0.1–1)
MONOCYTES: 10 % (ref 3–12)
NEUTROPHILS ABSOLUTE: 3.3 K/UL (ref 1.8–7.7)
NEUTROPHILS: 63 % (ref 40–75)
NON-HDL CHOLESTEROL: 80 MG/DL
PDW BLD-RTO: 12.6 % (ref 10–15.5)
PLATELET # BLD: 175 K/UL (ref 140–440)
PMV BLD AUTO: 11.2 FL (ref 9–13)
POTASSIUM SERPL-SCNC: 4.3 MMOL/L (ref 3.5–5.5)
RBC # BLD: 4.36 M/UL (ref 3.8–5.8)
SODIUM BLD-SCNC: 141 MMOL/L (ref 133–145)
THYROGLOBULIN AB: <1 IU/ML
THYROID PEROXIDASE (TPO) ABS: 15 IU/ML
TOTAL PROTEIN: 6.7 G/DL (ref 6.2–8.1)
TRIGL SERPL-MCNC: 95 MG/DL (ref 40–149)
VLDLC SERPL CALC-MCNC: 19 MG/DL (ref 8–30)
WBC # BLD: 5.2 K/UL (ref 4–11)

## 2024-08-03 DIAGNOSIS — F41.9 ANXIETY: ICD-10-CM

## 2024-08-04 RX ORDER — DIAZEPAM 10 MG/1
TABLET ORAL
Qty: 45 TABLET | Refills: 0 | Status: SHIPPED | OUTPATIENT
Start: 2024-08-04 | End: 2024-09-03

## 2024-08-08 SDOH — HEALTH STABILITY: PHYSICAL HEALTH: ON AVERAGE, HOW MANY MINUTES DO YOU ENGAGE IN EXERCISE AT THIS LEVEL?: 0 MIN

## 2024-08-08 SDOH — HEALTH STABILITY: PHYSICAL HEALTH: ON AVERAGE, HOW MANY DAYS PER WEEK DO YOU ENGAGE IN MODERATE TO STRENUOUS EXERCISE (LIKE A BRISK WALK)?: 0 DAYS

## 2024-08-08 ASSESSMENT — LIFESTYLE VARIABLES
HOW OFTEN DO YOU HAVE SIX OR MORE DRINKS ON ONE OCCASION: 1
HOW OFTEN DO YOU HAVE A DRINK CONTAINING ALCOHOL: 1
HOW MANY STANDARD DRINKS CONTAINING ALCOHOL DO YOU HAVE ON A TYPICAL DAY: PATIENT DOES NOT DRINK
HOW MANY STANDARD DRINKS CONTAINING ALCOHOL DO YOU HAVE ON A TYPICAL DAY: 0
HOW OFTEN DO YOU HAVE A DRINK CONTAINING ALCOHOL: NEVER

## 2024-08-08 ASSESSMENT — PATIENT HEALTH QUESTIONNAIRE - PHQ9
SUM OF ALL RESPONSES TO PHQ QUESTIONS 1-9: 8
8. MOVING OR SPEAKING SO SLOWLY THAT OTHER PEOPLE COULD HAVE NOTICED. OR THE OPPOSITE, BEING SO FIGETY OR RESTLESS THAT YOU HAVE BEEN MOVING AROUND A LOT MORE THAN USUAL: NOT AT ALL
SUM OF ALL RESPONSES TO PHQ QUESTIONS 1-9: 8
SUM OF ALL RESPONSES TO PHQ QUESTIONS 1-9: 8
SUM OF ALL RESPONSES TO PHQ9 QUESTIONS 1 & 2: 4
3. TROUBLE FALLING OR STAYING ASLEEP: NOT AT ALL
9. THOUGHTS THAT YOU WOULD BE BETTER OFF DEAD, OR OF HURTING YOURSELF: NOT AT ALL
SUM OF ALL RESPONSES TO PHQ QUESTIONS 1-9: 8
6. FEELING BAD ABOUT YOURSELF - OR THAT YOU ARE A FAILURE OR HAVE LET YOURSELF OR YOUR FAMILY DOWN: NOT AT ALL
4. FEELING TIRED OR HAVING LITTLE ENERGY: SEVERAL DAYS
10. IF YOU CHECKED OFF ANY PROBLEMS, HOW DIFFICULT HAVE THESE PROBLEMS MADE IT FOR YOU TO DO YOUR WORK, TAKE CARE OF THINGS AT HOME, OR GET ALONG WITH OTHER PEOPLE: SOMEWHAT DIFFICULT
7. TROUBLE CONCENTRATING ON THINGS, SUCH AS READING THE NEWSPAPER OR WATCHING TELEVISION: NOT AT ALL
1. LITTLE INTEREST OR PLEASURE IN DOING THINGS: NEARLY EVERY DAY
5. POOR APPETITE OR OVEREATING: NEARLY EVERY DAY
2. FEELING DOWN, DEPRESSED OR HOPELESS: SEVERAL DAYS

## 2024-08-09 ENCOUNTER — OFFICE VISIT (OUTPATIENT)
Facility: CLINIC | Age: 64
End: 2024-08-09
Payer: MEDICARE

## 2024-08-09 VITALS
HEART RATE: 81 BPM | SYSTOLIC BLOOD PRESSURE: 152 MMHG | HEIGHT: 73 IN | RESPIRATION RATE: 16 BRPM | DIASTOLIC BLOOD PRESSURE: 83 MMHG | WEIGHT: 273 LBS | OXYGEN SATURATION: 96 % | BODY MASS INDEX: 36.18 KG/M2 | TEMPERATURE: 98.3 F

## 2024-08-09 DIAGNOSIS — H91.93 BILATERAL HEARING LOSS, UNSPECIFIED HEARING LOSS TYPE: ICD-10-CM

## 2024-08-09 DIAGNOSIS — R73.01 IFG (IMPAIRED FASTING GLUCOSE): ICD-10-CM

## 2024-08-09 DIAGNOSIS — I10 ESSENTIAL HYPERTENSION: ICD-10-CM

## 2024-08-09 DIAGNOSIS — K63.5 POLYP OF COLON, UNSPECIFIED PART OF COLON, UNSPECIFIED TYPE: ICD-10-CM

## 2024-08-09 DIAGNOSIS — Z12.5 SCREENING FOR MALIGNANT NEOPLASM OF PROSTATE: ICD-10-CM

## 2024-08-09 DIAGNOSIS — K21.9 GASTROESOPHAGEAL REFLUX DISEASE WITHOUT ESOPHAGITIS: ICD-10-CM

## 2024-08-09 DIAGNOSIS — M15.9 PRIMARY OSTEOARTHRITIS INVOLVING MULTIPLE JOINTS: ICD-10-CM

## 2024-08-09 DIAGNOSIS — Z71.89 ADVANCED CARE PLANNING/COUNSELING DISCUSSION: ICD-10-CM

## 2024-08-09 DIAGNOSIS — E78.5 DYSLIPIDEMIA: ICD-10-CM

## 2024-08-09 DIAGNOSIS — I63.9 CEREBROVASCULAR ACCIDENT (CVA), UNSPECIFIED MECHANISM (HCC): ICD-10-CM

## 2024-08-09 DIAGNOSIS — Z00.00 MEDICARE ANNUAL WELLNESS VISIT, SUBSEQUENT: Primary | ICD-10-CM

## 2024-08-09 DIAGNOSIS — E66.01 SEVERE OBESITY (BMI 35.0-35.9 WITH COMORBIDITY) (HCC): ICD-10-CM

## 2024-08-09 DIAGNOSIS — E03.4 HYPOTHYROIDISM DUE TO ACQUIRED ATROPHY OF THYROID: ICD-10-CM

## 2024-08-09 PROCEDURE — 99214 OFFICE O/P EST MOD 30 MIN: CPT | Performed by: INTERNAL MEDICINE

## 2024-08-09 PROCEDURE — 1124F ACP DISCUSS-NO DSCNMKR DOCD: CPT | Performed by: INTERNAL MEDICINE

## 2024-08-09 PROCEDURE — G0439 PPPS, SUBSEQ VISIT: HCPCS | Performed by: INTERNAL MEDICINE

## 2024-08-09 PROCEDURE — 3077F SYST BP >= 140 MM HG: CPT | Performed by: INTERNAL MEDICINE

## 2024-08-09 PROCEDURE — 3079F DIAST BP 80-89 MM HG: CPT | Performed by: INTERNAL MEDICINE

## 2024-08-09 PROCEDURE — 99497 ADVNCD CARE PLAN 30 MIN: CPT | Performed by: INTERNAL MEDICINE

## 2024-08-09 RX ORDER — LISINOPRIL 10 MG/1
10 TABLET ORAL DAILY
Qty: 90 TABLET | Refills: 1 | Status: SHIPPED | OUTPATIENT
Start: 2024-08-09

## 2024-08-09 SDOH — ECONOMIC STABILITY: FOOD INSECURITY: WITHIN THE PAST 12 MONTHS, THE FOOD YOU BOUGHT JUST DIDN'T LAST AND YOU DIDN'T HAVE MONEY TO GET MORE.: NEVER TRUE

## 2024-08-09 SDOH — ECONOMIC STABILITY: INCOME INSECURITY: HOW HARD IS IT FOR YOU TO PAY FOR THE VERY BASICS LIKE FOOD, HOUSING, MEDICAL CARE, AND HEATING?: NOT HARD AT ALL

## 2024-08-09 SDOH — ECONOMIC STABILITY: FOOD INSECURITY: WITHIN THE PAST 12 MONTHS, YOU WORRIED THAT YOUR FOOD WOULD RUN OUT BEFORE YOU GOT MONEY TO BUY MORE.: NEVER TRUE

## 2024-08-09 NOTE — PROGRESS NOTES
Medicare Annual Wellness Visit    Sven Montes is here for Medicare AWV    Assessment & Plan   Advanced care planning/counseling discussion  Cerebrovascular accident (CVA), unspecified mechanism (HCC)  Essential hypertension  -     lisinopril (PRINIVIL;ZESTRIL) 10 MG tablet; Take 1 tablet by mouth daily, Disp-90 tablet, R-1Normal  Gastroesophageal reflux disease without esophagitis  Polyp of colon, unspecified part of colon, unspecified type  Hypothyroidism due to acquired atrophy of thyroid  -     TSH + Free T4 Panel; Future  IFG (impaired fasting glucose)  -     HEMOGLOBIN A1C W/O EAG; Future  -     Basic Metabolic Panel; Future  Primary osteoarthritis involving multiple joints  Dyslipidemia  Severe obesity (BMI 35.0-35.9 with comorbidity) (HCC)  Screening for malignant neoplasm of prostate  -     PSA Screening; Future  Bilateral hearing loss, unspecified hearing loss type  -     External Referral To ENT  Medicare annual wellness visit, subsequent    Recommendations for Preventive Services Due: see orders and patient instructions/AVS.  Recommended screening schedule for the next 5-10 years is provided to the patient in written form: see Patient Instructions/AVS.     Return for Medicare Annual Wellness Visit in 1 year.     Subjective       Patient's complete Health Risk Assessment and screening values have been reviewed and are found in Flowsheets. The following problems were reviewed today and where indicated follow up appointments were made and/or referrals ordered.    Positive Risk Factor Screenings with Interventions:    Fall Risk:  Do you feel unsteady or are you worried about falling? : (!) yes  2 or more falls in past year?: (!) yes  Fall with injury in past year?: no     Interventions:    Reviewed medications, home hazards, visual acuity, and co-morbidities that can increase risk for falls  Patient declines any further evaluation or treatment     Depression:  PHQ-2 Score: 4  PHQ-9 Total Score: 8  Total 
Sven Montes presents today for   Chief Complaint   Patient presents with    Medicare AWV       \"Have you been to the ER, urgent care clinic since your last visit?  Hospitalized since your last visit?\"    NO    “Have you seen or consulted any other health care providers outside of [facility] since your last visit?”    YES - When: approximately 1 months ago.  Where and Why: Dr. Gabriel/Orthopedics, knee.             
2.3 2.0 - 4.0 g/dL    Albumin/Globulin Ratio 1.9 1.1 - 2.6 ratio    Total Protein 6.7 6.2 - 8.1 g/dL    Anion Gap 9.0 3.0 - 15.0 mmol/L   Thyroid Antibodies   Result Value Ref Range    Thyroid Peroxidase (TPO) Abs 15 (H) <9 IU/mL    Thyroglobulin Ab <1 <=1 IU/mL   Results for orders placed or performed in visit on 06/07/24 (from the past 2160 hour(s))   Urine Drug Screen   Result Value Ref Range    Amphetamine Screen, Ur NDET NDET    Barbiturates NDET NDET    Benzodiazepines DET (A) NDET    Cannabinoids NDET NDET    Cocaine (Metabolite) NDET NDET    Hydrocodone, Urine NDET NDET    Methadone Screen, Urine NDET NDET    Opiates NDET NDET    Oxycodone Urine NDET NDET    PCP Screen NDET NDET    6-Acetylmorphine Screen Urine NDET NDET    pH, Urine 6.3 4.5 - 8.0    Specific Gravity, UA 1.021 1.003 - 1.030     We reviewed the patient's labs from the last several visits to point out trends in the numbers        Patient Active Problem List   Diagnosis    Hypothyroidism due to acquired atrophy of thyroid    Dyslipidemia    Hypovitaminosis D    Colon polyps    IFG (impaired fasting glucose)    Neuropathy    Hypogonadism male    Anxiety    SHWETA (obstructive sleep apnea)    Gastroesophageal reflux disease without esophagitis    Primary osteoarthritis involving multiple joints    Severe obesity (BMI 35.0-35.9 with comorbidity) (HCC)    CVA (cerebral vascular accident) (HCC)    Essential hypertension    Chronic sinusitis    BPH with obstruction/lower urinary tract symptoms         Assessment and plan:  1.  HLP.  Continue current regimen   2.  SHWETA.  F/U Dr Velazquez as directed   3.  Hypothyroidism.  Check levels soon  4.  Hypogonadism.  F/U Dr. Richards as needed, he is off replacement.    5.  GERD.  PPI and avoidance measures  6.  IFG.  Lifestyle and dietary measures, wt loss would be ideal, a1c next draw  7.  Neuropathy.  Declined meds   8.  Polyps and diverticulosis.  Fiber  9.  Obesity.  Lifestyle and dietary measures.  Portion

## 2024-08-09 NOTE — ACP (ADVANCE CARE PLANNING)
Advance Care Planning     Advance Care Planning (ACP) Physician/NP/PA Conversation    Date of Conversation: 8/9/2024  Conducted with: Patient with Decision Making Capacity    Healthcare Decision Maker:      Primary Decision Maker: Annita Montes - Spouse - [phone]    Click here to complete Healthcare Decision Makers including selection of the Healthcare Decision Maker Relationship (ie \"Primary\")  Today we documented Decision Maker(s) consistent with Legal Next of Kin hierarchy.    Care Preferences:    Hospitalization:  \"If your health worsens and it becomes clear that your chance of recovery is unlikely, what would be your preference regarding hospitalization?\"  The patient would prefer hospitalization.    Ventilation:  \"If you were unable to breath on your own and your chance of recovery was unlikely, what would be your preference about the use of a ventilator (breathing machine) if it was available to you?\"  The patient would desire the use of a ventilator.    Resuscitation:  \"In the event your heart stopped as a result of an underlying serious health condition, would you want attempts made to restart your heart, or would you prefer a natural death?\"  Yes, attempt to resuscitate.    ventilation preferences, hospitalization preferences, and resuscitation preferences    Conversation Outcomes / Follow-Up Plan:  ACP in process - information provided, considering goals and options  Reviewed DNR/DNI and patient elects Full Code (Attempt Resuscitation)    Length of Voluntary ACP Conversation in minutes:  16 minutes    LENNIE KHAN MD

## 2024-09-04 LAB
ANION GAP SERPL CALCULATED.3IONS-SCNC: 8 MMOL/L (ref 3–15)
BUN BLDV-MCNC: 21 MG/DL (ref 6–22)
CALCIUM SERPL-MCNC: 9 MG/DL (ref 8.4–10.5)
CHLORIDE BLD-SCNC: 107 MMOL/L (ref 98–110)
CO2: 27 MMOL/L (ref 20–32)
CREAT SERPL-MCNC: 0.8 MG/DL (ref 0.8–1.6)
ESTIMATED AVERAGE GLUCOSE: 119 MG/DL (ref 91–123)
GFR, ESTIMATED: >60 ML/MIN/1.73 SQ.M.
GLUCOSE: 99 MG/DL (ref 70–99)
HBA1C MFR BLD: 5.8 % (ref 4.8–5.6)
POTASSIUM SERPL-SCNC: 4.7 MMOL/L (ref 3.5–5.5)
PROSTATE SPECIFIC ANTIGEN: 1.21 NG/ML
SODIUM BLD-SCNC: 142 MMOL/L (ref 133–145)
T4 FREE: 1.2 NG/DL (ref 0.9–1.8)
TSH SERPL DL<=0.05 MIU/L-ACNC: 3.03 MCU/ML (ref 0.27–4.2)

## 2024-09-10 DIAGNOSIS — F41.9 ANXIETY: ICD-10-CM

## 2024-09-11 RX ORDER — DIAZEPAM 10 MG
TABLET ORAL
Qty: 45 TABLET | Refills: 0 | Status: SHIPPED | OUTPATIENT
Start: 2024-09-11 | End: 2024-10-11

## 2024-10-22 DIAGNOSIS — F41.9 ANXIETY: ICD-10-CM

## 2024-10-24 RX ORDER — DIAZEPAM 10 MG/1
TABLET ORAL
Qty: 45 TABLET | Refills: 0 | Status: SHIPPED | OUTPATIENT
Start: 2024-10-24 | End: 2024-11-23

## 2024-11-24 DIAGNOSIS — F41.9 ANXIETY: ICD-10-CM

## 2024-11-24 RX ORDER — DIAZEPAM 10 MG/1
TABLET ORAL
Qty: 45 TABLET | Refills: 0 | Status: SHIPPED | OUTPATIENT
Start: 2024-11-24 | End: 2024-12-24

## 2024-11-25 ENCOUNTER — OFFICE VISIT (OUTPATIENT)
Facility: CLINIC | Age: 64
End: 2024-11-25
Payer: MEDICARE

## 2024-11-25 VITALS
HEART RATE: 70 BPM | TEMPERATURE: 98.1 F | OXYGEN SATURATION: 99 % | WEIGHT: 280 LBS | HEIGHT: 73 IN | SYSTOLIC BLOOD PRESSURE: 148 MMHG | RESPIRATION RATE: 16 BRPM | DIASTOLIC BLOOD PRESSURE: 82 MMHG | BODY MASS INDEX: 37.11 KG/M2

## 2024-11-25 DIAGNOSIS — E66.01 SEVERE OBESITY (BMI 35.0-35.9 WITH COMORBIDITY): ICD-10-CM

## 2024-11-25 DIAGNOSIS — D69.6 THROMBOCYTOPENIA (HCC): ICD-10-CM

## 2024-11-25 DIAGNOSIS — E03.4 HYPOTHYROIDISM DUE TO ACQUIRED ATROPHY OF THYROID: ICD-10-CM

## 2024-11-25 DIAGNOSIS — Z01.818 PREOP EXAM FOR INTERNAL MEDICINE: Primary | ICD-10-CM

## 2024-11-25 DIAGNOSIS — I10 ESSENTIAL HYPERTENSION: ICD-10-CM

## 2024-11-25 DIAGNOSIS — M15.0 PRIMARY OSTEOARTHRITIS INVOLVING MULTIPLE JOINTS: ICD-10-CM

## 2024-11-25 PROCEDURE — 1124F ACP DISCUSS-NO DSCNMKR DOCD: CPT | Performed by: INTERNAL MEDICINE

## 2024-11-25 PROCEDURE — 3079F DIAST BP 80-89 MM HG: CPT | Performed by: INTERNAL MEDICINE

## 2024-11-25 PROCEDURE — 3077F SYST BP >= 140 MM HG: CPT | Performed by: INTERNAL MEDICINE

## 2024-11-25 PROCEDURE — 99214 OFFICE O/P EST MOD 30 MIN: CPT | Performed by: INTERNAL MEDICINE

## 2024-11-25 RX ORDER — HYDROCHLOROTHIAZIDE 25 MG/1
25 TABLET ORAL EVERY MORNING
Qty: 30 TABLET | Refills: 5 | Status: SHIPPED | OUTPATIENT
Start: 2024-11-25

## 2024-11-25 RX ORDER — MELOXICAM 15 MG/1
15 TABLET ORAL PRN
COMMUNITY
Start: 2024-11-24

## 2024-11-25 NOTE — PROGRESS NOTES
Sven Montes presents today for   Chief Complaint   Patient presents with    Pre-op Exam       \"Have you been to the ER, urgent care clinic since your last visit?  Hospitalized since your last visit?\"    NO    “Have you seen or consulted any other health care providers outside of [facility] since your last visit?”    YES - When: approximately 3 months ago.  Where and Why: [facility], knee pain.             
contraindications noted.  Routine postop prophylaxis per protocol. If needed, cover with correction insulin per hospital protocol  12. Low platelets.  Recheck in future        RTC 2/25      Above conditions discussed at length and patient vocalized understanding.  All questions answered to patient satisfaction     Diagnosis Orders   1. Preop exam for internal medicine        2. Essential hypertension  hydroCHLOROthiazide (HYDRODIURIL) 25 MG tablet    Basic Metabolic Panel      3. Primary osteoarthritis involving multiple joints        4. Severe obesity (BMI 35.0-35.9 with comorbidity)        5. Hypothyroidism due to acquired atrophy of thyroid        6. Thrombocytopenia (HCC)  CBC with Auto Differential

## 2024-12-04 RX ORDER — ROSUVASTATIN CALCIUM 20 MG/1
20 TABLET, COATED ORAL NIGHTLY
Qty: 90 TABLET | Refills: 3 | Status: SHIPPED | OUTPATIENT
Start: 2024-12-04

## 2024-12-04 NOTE — TELEPHONE ENCOUNTER
Refill request via fax     Medication:   rosuvastatin   Quantity: 90  Pharmacy:   [facility] - [address]     Last Fill: 7/31/2024    PCP: William Cain MD    LAST OFFICE VISIT: 11/25/2024    Patient only has 3 pills left     Future Appointments   Date Time Provider Department Center   3/3/2025 11:20 AM William Cain MD [facility] DEP   8/11/2025 10:00 AM William Cain MD [facility] DEP

## 2024-12-21 DIAGNOSIS — F41.9 ANXIETY: ICD-10-CM

## 2024-12-23 RX ORDER — DIAZEPAM 10 MG/1
TABLET ORAL
Qty: 45 TABLET | Refills: 0 | Status: SHIPPED | OUTPATIENT
Start: 2024-12-23 | End: 2025-01-22

## 2024-12-27 NOTE — PROGRESS NOTES
PHYSICAL / OCCUPATIONAL THERAPY - DAILY TREATMENT NOTE (updated )    Patient Name: Víctor Medrano    Date: 2023    : 1960  Insurance: Payor: [employer] / Plan: Luis Rea / Product Type: *No Product type* /      Patient  verified Yes     Visit #   Current / Total 5 12   Time   In / Out 333 pm  426 pm    Pain   In / Out 1/10 3/10    Subjective Functional Status/Changes: Patient reports that his knee is sore today. Patient explains that he has noticed that his is a little straighter and he has a pocket of swelling on the outside of his knee now. Changes to:  Meds, Allergies, Med Hx, Sx Hx? If yes, update Summary List no       TREATMENT AREA =  No admission diagnoses are documented for this encounter. OBJECTIVE    Modalities Rationale:     decrease edema, decrease inflammation, and decrease pain to improve patient's ability to progress to PLOF and address remaining functional goals. min [] Estim Unattended, type/location:                                      []  w/ice    []  w/heat    min [] Estim Attended, type/location:                                     []  w/US     []  w/ice    []  w/heat    []  TENS insruct      min []  Mechanical Traction: type/lbs                   []  pro   []  sup   []  int   []  cont    []  before manual    []  after manual    min []  Ultrasound, settings/location:      min []  Iontophoresis w/ dexamethasone, location:                                               []  take home patch       []  in clinic   10 min  unbilled [x]  Ice     []  Heat    location/position: Left knee with patient in the reclined position. min []  Paraffin,  details:     min []  Vasopneumatic Device, press/temp:     min []  Gillian Altamirano / Sterling Mckeon:     If using vaso (only need to measure limb vaso being performed on)      pre-treatment girth :       post-treatment girth :       measured at (landmark location) :      min []  Other:    Skin assessment post-treatment (if applicable): []  intact    []  redness- no adverse reaction                 []redness - adverse reaction:         Therapeutic Procedures: Tx Min Billable or 1:1 Min (if diff from Tx Min) Procedure, Rationale, Specifics    18 [id number] Therapeutic Exercise (timed):  increase ROM, strength, coordination, balance, and proprioception to improve patient's ability to progress to PLOF and address remaining functional goals. (see flow sheet as applicable)     Details if applicable:        15 [id number] Therapeutic Activity (timed):  use of dynamic activities replicating functional movements to increase ROM, strength, coordination, balance, and proprioception in order to improve patient's ability to progress to PLOF and address remaining functional goals. (see flow sheet as applicable)     Details if applicable:      10  91493 Neuromuscular Re-Education (timed):  improve balance, coordination, kinesthetic sense, posture, core stability and proprioception to improve patient's ability to develop conscious control of individual muscles and awareness of position of extremities in order to progress to PLOF and address remaining functional goals. (see flow sheet as applicable)     Details if applicable: Effleurage/STM to left knee and patellar mobs          Details if applicable:            Details if applicable:      37 [facility] Totals Reminder: bill using total billable min of TIMED therapeutic procedures (example: do not include dry needle or estim unattended, both untimed codes, in totals to left)  8-22 min = 1 unit; 23-37 min = 2 units; 38-52 min = 3 units; 53-67 min = 4 units; 68-82 min = 5 units   Total Total     [x]  Patient Education billed concurrently with other procedures   [x] Review HEP    [] Progressed/Changed HEP, detail:    [] Other detail:       Objective Information/Functional Measures/Assessment  Left knee ROM: -1 - 98 degrees     Patient presents to PT with decreased left knee pain with compared to last visit.  Extension lag IR consulted for placement of bilateral PCN for concern of obstructive uropathy.    Vitals, labs, and recent renal ultrasound imaging and urine output were reviewed. There is interval improvement in urine output with downtrending creatinine despite last dialysis session on 12/24/24.  Discussion with Dr. Azul (nephrology) to discuss plans for future dialysis. Per current labs and imaging, nephrology does not plan for current HD sessions.    Patient initial obstructive uropathy, originally evaluated with bedside cystoscopy likely UVJ stenosis secondary to inflammation from cystitis. Obstruction likely improving secondary to improvement in cystitis. Will defer bilateral PCN at this time due to improving renal status.    Please re-consult if there is worsening in clinical status requiring PCN placement.  Recommendations and plan discussed with provider Dr. Louie in primary team.  present during ambulation. Patient instructed on ways to improve at home. Patient verbalized understanding. Patient performed exercises, as per flow sheet, to further assist with improving left knee strength and ROM. Patient tolerated all of today's exercises with no no increase in pain. Patient is progressing well with PT. Will continue to progress patient as tolerated. Patient will continue to benefit from skilled PT / OT services to modify and progress therapeutic interventions, analyze and address functional mobility deficits, analyze and address ROM deficits, analyze and address strength deficits, analyze and address soft tissue restrictions, analyze and cue for proper movement patterns, analyze and address imbalance/dizziness, and instruct in home and community integration to address functional deficits and attain remaining goals. Progress toward goals / Updated goals:  []  See Progress Note/Recertification  Short Term Goals: To be accomplished in 2 weeks:  Patient to be independent in HEP to maximize therapeutic effect of care plan. Current: Has initiated 2x day 2/7/23  Patient to improve left knee flexion to 90 deg for greater ease of functional transfers. Current: Left knee PROM: -1-98 degrees. 2/14/23      Long Term Goals: To be accomplished in 4 weeks:  Patient to improve left knee flexion to 115 deg and extension to 0 deg for greater ease of sit to stand and ambulation. Current: Left knee PROM: -1-98 degrees. 2/14/23      Patient to improve left knee strength MMT to 4+/5 extension and 4/5 flexion for greater ease of stair negotiation. CURRENT ongoing NA 2/7/23  Patient to demonstrate and report ambulation with absent-mild gait deviations in home and community settings without assistive device to facilitate community re-integration. CURRENT- Patient presented to PT with SPC. Patient's SPC required an adjustment and patient was instructed on proper sequencing for safety.  2/10/2023  Patient to Patient on schedule for bilateral PCN for concern of obstructive uropathy.    Vitals, labs, and recent renal ultrasound imaging and urine output were reviewed. There is interval improvement in urine output with downtrending creatinine despite last dialysis session on 12/24/24.  Discussion with Dr. Azul (nephrology) to discuss plans for future dialysis. Per current labs and imaging, nephrology does not plan for current HD sessions.    Patient initial obstructive uropathy, originally evaluated with bedside cystoscopy likely UVJ stenosis secondary to inflammation from cystitis. Obstruction likely improving secondary to improvement in cystitis. Will hold off on PCN at this time due to improving renal status.    Please re-consult if there is worsening in clinical status requiring PCN placement.  Recommendations and plan discussed with provider Dr. Louie in primary team.  improve FOTO score to 47 to indicate progression towards PLOF. CURRENT ongoing NA 2/14/23       PLAN  Yes  Continue plan of care  []  Upgrade activities as tolerated  []  Discharge due to :  [x]  Other: PN, next visit due to last authorized visit.      Araceli Murillo, PTA    2/14/2023    4:22 PM    Future Appointments   Date Time Provider Jaimie Dunbar   2/17/2023 10:30 AM Darren Hernandez PTA [facility]   3/2/2023  9:30 AM RO Herrera [facility] BS AMB   3/16/2023  3:20 PM Kleber Nettles MD [facility] AMB   3/23/2023  7:00 AM Kleber Nettles MD [facility] AMB   7/25/2023  8:05 AM [facility] VISIT [facility] AM[facility]   8/1/2023  8:00 AM Barbie Quintanilla MD [facility] AMB

## 2025-01-07 ENCOUNTER — HOSPITAL ENCOUNTER (OUTPATIENT)
Facility: HOSPITAL | Age: 65
Setting detail: RECURRING SERIES
Discharge: HOME | End: 2025-01-10
Payer: MEDICARE

## 2025-01-07 ENCOUNTER — TELEPHONE (OUTPATIENT)
Facility: CLINIC | Age: 65
End: 2025-01-07

## 2025-01-07 PROCEDURE — 97161 PT EVAL LOW COMPLEX 20 MIN: CPT

## 2025-01-07 NOTE — THERAPY EVALUATION
PT DAILY TREATMENT NOTE/KNEE EVAL       Patient Name: Sven Montes    Date: 2025    : 1960  Insurance: Payor: [facility] MEDIC[facility] / Plan: [employer] / Product Type: *No Product type* /      Patient  verified yes     Visit #   Current / Total 1 24   Time   In / Out 250 332   Pain   In / Out 2 2   Subjective Functional Status/Changes:       Treatment Area: Left knee pain [M25.562]    SUBJECTIVE  Pain Level (0-10 scale): 2  [x]constant []intermittent []improving []worsening [x]no change since onset  Worse activity      better rest  Subjective functional status/changes:     PLOF:past 2 years, increase falls,  I ADLS and activities, no AD, drove, tolerated activities at home and in the community with L knee pain, retired and works 2 days a week part time  Limitations to PLOF: pain  Mechanism of Injury: P/O 24 revision of L TKA  Current symptoms/Complaints: 63 YO male diagnosed as above and with S/S consistent with above diagnosis presents to skilled outpatient PT [facility] P/O L TKA revision on 24 after original TKA in 2023. Notes he had continued pain and instability with recurrent falls x 2 years until having 2nd opinion that led to the revision. Falls attributed due to decrease flexibility of the L knee affecting car transfers as well. Pain today is 2/10 and is using R SC mod I. Antalgic gait pattern noted. Also C/O decreased endurance and activity tolerance since surgery, reports he gets winded and tired easy and needs to sit down and catch his breath.     Previous Treatment/Compliance: MD, revision, medication, ice, HHPT x 2 sessions  PMHx/Surgical Hx: L TKA , arthritis, heart disease/high cholesterol, HTN, Stroke , thyroid problems  Work Hx: retired  Living Situation: lives in a 1 STY, not alone, ramp entry  Pt Goals: flexibility and less pain  Barriers: [x]pain []financial []time []transportation []other  Motivation: good  Substance use: []Alcohol []Tobacco []other:

## 2025-01-07 NOTE — THERAPY EVALUATION
[facility]  [address], [address], [place] [address]  Ph:[phone] Fx:[phone]  Plan of Care / Statement of Necessity for Physical Therapy Services     Patient Name: Sven Montes : 1960   Medical   Diagnosis: Left knee pain [M25.562]  Treatment Diagnosis:  M25.562  LEFT KNEE PAIN    Onset Date: P/O 24     Referral Source: Goldie Ring PA Start of Care (SOC): 2025   Prior Hospitalization: See medical history Provider #: [id number]   Prior Level of Function: past 2 years, increase falls, I ADLS and activities, no AD, drove, tolerated activities at home and in the community with L knee pain, retired and works 2 days a week part time    Comorbidities:  Musculoskeletal disorders L TKA , arthritis, heart disease/high cholesterol, HTN, Stroke , thyroid problems      Assessment / key information:  65 YO male diagnosed as above and with S/S consistent with above diagnosis presents to skilled outpatient PT [facility] P/O L TKA revision on 24 after original TKA in 2023. Notes he had continued pain and instability with recurrent falls x 2 years until having 2nd opinion that led to the revision. Falls attributed due to decrease flexibility of the L knee affecting car transfers as well. Pain today is 2/10 and is using R SC mod I. Antalgic gait pattern noted. Also C/O decreased endurance and activity tolerance since surgery, reports he gets winded and tired easy and needs to sit down and catch his breath.    patient with P/O pain, edema, LOM, decrease strength, gait deviations, decrease tolerance to ADLs and activities.  Patient demonstrates the potential to make gains with improved ROM, strength, endurance/activity tolerance, functional LEFS  survey score baseline 14 and all within a reasonable time frame so as to increase their functional independence with ADLs and activities for carryover to  improved quality of life, tolerance to

## 2025-01-07 NOTE — TELEPHONE ENCOUNTER
Pt spouse called stating that she is having some concerns about pt health pt spouse stated ever since he had a knee replacement his energy level has been she stated he has been weak and out of breathe. Pt spouse stated that when he went to physical therapy they told her to notify his doctor she also stated after his knee replacement pt hemoglobin level was low and wonder if that has anything to do with it.      Please advise

## 2025-01-08 ENCOUNTER — HOSPITAL ENCOUNTER (OUTPATIENT)
Facility: HOSPITAL | Age: 65
Setting detail: RECURRING SERIES
Discharge: HOME | End: 2025-01-11
Payer: MEDICARE

## 2025-01-08 PROCEDURE — 97530 THERAPEUTIC ACTIVITIES: CPT

## 2025-01-08 PROCEDURE — 97016 VASOPNEUMATIC DEVICE THERAPY: CPT

## 2025-01-08 PROCEDURE — 97140 MANUAL THERAPY 1/> REGIONS: CPT

## 2025-01-08 PROCEDURE — 97110 THERAPEUTIC EXERCISES: CPT

## 2025-01-08 NOTE — PROGRESS NOTES
deviations     Next PN/ RC due 2/7/2025  Auth due (visit number/ date) SC[facility] 12/31/2025     PLAN  - Continue Plan of Care  - Upgrade activities as tolerated    Katja Fonseca PTA    1/8/2025    9:34 AM  If an interpreting service was utilized for treatment of this patient, the contents of this document represent the material reviewed with the patient via the .     Future Appointments   Date Time Provider Department Center   1/8/2025  4:10 PM Katja Fonseca, PTA [facility]   1/13/2025  3:00 PM William Cain MD [facility]   1/13/2025  3:30 PM Katja Fonseca, PTA [facility]   1/16/2025  3:30 PM Katja Fonseca, PTA [facility]   1/20/2025  2:50 PM Katja Fonseca, PTA [facility]   1/23/2025  3:30 PM Katja Fonseca, PTA [facility]   1/27/2025  3:30 PM Katja Fonseca, PTA [facility]   1/30/2025  3:30 PM Katja Fonseca, PTA [facility]   3/3/2025 11:20 AM William Cain MD [facility] DEP   8/11/2025 10:00 AM William Cain MD [facility] DEP

## 2025-01-13 ENCOUNTER — APPOINTMENT (OUTPATIENT)
Facility: HOSPITAL | Age: 65
End: 2025-01-13
Payer: MEDICARE

## 2025-01-13 ENCOUNTER — OFFICE VISIT (OUTPATIENT)
Facility: CLINIC | Age: 65
End: 2025-01-13
Payer: MEDICARE

## 2025-01-13 VITALS
WEIGHT: 264 LBS | HEIGHT: 73 IN | HEART RATE: 102 BPM | DIASTOLIC BLOOD PRESSURE: 74 MMHG | TEMPERATURE: 98.5 F | BODY MASS INDEX: 34.99 KG/M2 | RESPIRATION RATE: 16 BRPM | OXYGEN SATURATION: 100 % | SYSTOLIC BLOOD PRESSURE: 139 MMHG

## 2025-01-13 DIAGNOSIS — R53.83 OTHER FATIGUE: ICD-10-CM

## 2025-01-13 DIAGNOSIS — D62 ACUTE BLOOD LOSS AS CAUSE OF POSTOPERATIVE ANEMIA: ICD-10-CM

## 2025-01-13 DIAGNOSIS — R06.02 SOB (SHORTNESS OF BREATH): Primary | ICD-10-CM

## 2025-01-13 PROCEDURE — 3078F DIAST BP <80 MM HG: CPT | Performed by: INTERNAL MEDICINE

## 2025-01-13 PROCEDURE — 3075F SYST BP GE 130 - 139MM HG: CPT | Performed by: INTERNAL MEDICINE

## 2025-01-13 PROCEDURE — 99214 OFFICE O/P EST MOD 30 MIN: CPT | Performed by: INTERNAL MEDICINE

## 2025-01-13 PROCEDURE — 1124F ACP DISCUSS-NO DSCNMKR DOCD: CPT | Performed by: INTERNAL MEDICINE

## 2025-01-13 SDOH — ECONOMIC STABILITY: FOOD INSECURITY: WITHIN THE PAST 12 MONTHS, THE FOOD YOU BOUGHT JUST DIDN'T LAST AND YOU DIDN'T HAVE MONEY TO GET MORE.: NEVER TRUE

## 2025-01-13 SDOH — ECONOMIC STABILITY: FOOD INSECURITY: WITHIN THE PAST 12 MONTHS, YOU WORRIED THAT YOUR FOOD WOULD RUN OUT BEFORE YOU GOT MONEY TO BUY MORE.: NEVER TRUE

## 2025-01-13 ASSESSMENT — PATIENT HEALTH QUESTIONNAIRE - PHQ9
1. LITTLE INTEREST OR PLEASURE IN DOING THINGS: NOT AT ALL
SUM OF ALL RESPONSES TO PHQ9 QUESTIONS 1 & 2: 0
SUM OF ALL RESPONSES TO PHQ QUESTIONS 1-9: 0
2. FEELING DOWN, DEPRESSED OR HOPELESS: NOT AT ALL
SUM OF ALL RESPONSES TO PHQ QUESTIONS 1-9: 0

## 2025-01-13 NOTE — PROGRESS NOTES
64 y.o. male who presents for evaluation.    He underwent left knee replacement by Dr Gabriel on 12/19/24.  Since then, he's not felt well.  Reports low energy, feeling very anxious and ill at ease, some sob.  No cp, pnd, worsening edema.  Just getting started with his full rehab.  He was told he 'lost a lot of blood' with the surgery, last hb was 10.4 prior to discharge (preop hb 14.0).  He has not noted any bleeding from anywhere since coming home.  The valium helps with the anxiety.  Even today, while waiting in the lobby he felt like he was having a panic attack    Past Medical History:   Diagnosis Date    Allergic rhinitis, seasonal     Anxiety     JUDD-7 was 11/21 from 7/11    BPH with obstruction/lower urinary tract symptoms 2023    Colon polyps 2008    Dr. Reg ROSADOID-19 vaccine dose declined 08/2023    boosters    Degenerative arthritis of cervical spine 2013    on mri    Depression     PHQ-9 was 7/27 from (7/11); 10/27 from (5/15); lexapro intol; effexor briefly 2020    Diverticulosis     Dyslipidemia     intol lipitor, prava, zocor; tolerated mevacor    FHx: heart disease     GERD (gastroesophageal reflux disease)     egd 10/22 Dr Melissa mild reflux changes, neg dysplasia or eoe    H/O cardiovascular stress test     ETT neg (5/16)    H/O echocardiogram     (1/18) nl lv, ef 60%, mild dd, mild/mod LVH, tr AI, aortic root 4cm, well seated pfo closure device Dr Lyles    Hearing loss 2016    right sided; s/p myringotomy Dr Cisneros; now seeing diff ENT    Hemorrhoid     Hypertension 03/22/2018    component of WHITE COAT    Hypogonadism male 2011    Dr Richards; dc'ed replacement tx 2015    Hypothyroidism     Hashimoto's/tpo+    IFG (impaired fasting glucose) 2011    Nephrolithiasis     Obesity (BMI 30-39.9)     peak weight 245 lbs, bmi 34.7 from 5/15; dec neida supp, med sup wt loss, bariatrics; IF 6/18 start weight 251 but did not do; did keto/low carb 6/18    SHWETA (obstructive sleep apnea) 2006    Dr. Velazquez

## 2025-01-13 NOTE — PROGRESS NOTES
Sven Montes presents today for   Chief Complaint   Patient presents with    Post-Op Check     ;post surgery symptoms including loss of energy       \"Have you been to the ER, urgent care clinic since your last visit?  Hospitalized since your last visit?\"    NO    “Have you seen or consulted any other health care providers outside of [facility] since your last visit?”    YES - When: approximately 1 months ago.  Where and Why: [facility], knee surgery.

## 2025-01-15 ENCOUNTER — HOSPITAL ENCOUNTER (OUTPATIENT)
Facility: HOSPITAL | Age: 65
Setting detail: RECURRING SERIES
Discharge: HOME | End: 2025-01-18
Payer: MEDICARE

## 2025-01-15 PROCEDURE — 97530 THERAPEUTIC ACTIVITIES: CPT

## 2025-01-15 PROCEDURE — 97016 VASOPNEUMATIC DEVICE THERAPY: CPT

## 2025-01-15 PROCEDURE — 97140 MANUAL THERAPY 1/> REGIONS: CPT

## 2025-01-15 PROCEDURE — 97110 THERAPEUTIC EXERCISES: CPT

## 2025-01-15 NOTE — PROGRESS NOTES
PHYSICAL / OCCUPATIONAL THERAPY - DAILY TREATMENT NOTE    Patient Name: Sven Montes    Date: 1/15/2025    : 1960  Insurance: Payor: FAREED MEDICARE / [facility] / Product Type: *No Product type* /      Patient  verified Yes     Visit #   Current / Total 3 24   Time   In / Out 1250 pm  145 pm    Pain   In / Out 2/10  0/10    Subjective Functional Status/Changes: Patient reports doing his exercises too late last night causing some increased left knee pain throughout the night.     TREATMENT AREA =  Left knee pain [M25.562]     OBJECTIVE    Modalities Rationale:     decrease edema, decrease inflammation, and decrease pain to improve patient's ability to progress to PLOF and address remaining functional goals.     min [] Estim Unattended, type/location:                                      []  w/ice    []  w/heat    min [] Estim Attended, type/location:                                     []  w/US     []  w/ice    []  w/heat    []  TENS insruct      min []  Mechanical Traction: type/lbs                   []  pro   []  sup   []  int   []  cont    []  before manual    []  after manual    min []  Ultrasound, settings/location:      min  unbill []  Ice     []  Heat    location/position:     min []  Paraffin,  details:    10 min []  Vasopneumatic Device, press/temp: Med/Low    min []  Whirlpool / Fluido:    If using vaso (only need to measure limb vaso being performed on)      pre-treatment girth : 55 cm       post-treatment girth : 53 cm       measured at (landmark location) :  mid patella    min []  Other:    Skin assessment post-treatment:   Intact      Therapeutic Procedures:    Tx Min Billable or 1:1 Min (if diff from Tx Min) Procedure, Rationale, Specifics   22  36645 Therapeutic Exercise (timed):  increase ROM, strength, coordination, balance, and proprioception to improve patient's ability to progress to PLOF and address remaining functional goals. (see flow sheet as applicable)

## 2025-01-16 ENCOUNTER — APPOINTMENT (OUTPATIENT)
Facility: HOSPITAL | Age: 65
End: 2025-01-16
Payer: MEDICARE

## 2025-01-16 LAB
A/G RATIO: 1.8 RATIO (ref 1.1–2.6)
ALBUMIN: 4.4 G/DL (ref 3.5–5)
ALP BLD-CCNC: 99 U/L (ref 40–125)
ALT SERPL-CCNC: 12 U/L (ref 5–40)
ANION GAP SERPL CALCULATED.3IONS-SCNC: 14 MMOL/L (ref 3–15)
AST SERPL-CCNC: 21 U/L (ref 10–37)
B-TYPE NATRIURETIC PEPTIDE: 44 PG/ML
BASOPHILS ABSOLUTE: 0 K/UL (ref 0–0.2)
BASOPHILS RELATIVE PERCENT: 0 % (ref 0–2)
BILIRUB SERPL-MCNC: 0.2 MG/DL (ref 0.2–1.2)
BUN BLDV-MCNC: 26 MG/DL (ref 6–22)
CALCIUM SERPL-MCNC: 9.5 MG/DL (ref 8.4–10.5)
CHLORIDE BLD-SCNC: 105 MMOL/L (ref 98–110)
CO2: 22 MMOL/L (ref 20–32)
CREAT SERPL-MCNC: 0.9 MG/DL (ref 0.8–1.6)
EOSINOPHIL # BLD: 3 % (ref 0–6)
EOSINOPHILS ABSOLUTE: 0.2 K/UL (ref 0–0.5)
GFR, ESTIMATED: >60 ML/MIN/1.73 SQ.M.
GLOBULIN: 2.5 G/DL (ref 2–4)
GLUCOSE: 127 MG/DL (ref 70–99)
HCT VFR BLD CALC: 36.1 % (ref 37.8–52.2)
HEMOGLOBIN: 11.1 G/DL (ref 12.6–17.1)
LYMPHOCYTES # BLD: 21 % (ref 20–45)
LYMPHOCYTES ABSOLUTE: 1.1 K/UL (ref 1–4.8)
MCH RBC QN AUTO: 29 PG (ref 26–34)
MCHC RBC AUTO-ENTMCNC: 31 G/DL (ref 31–36)
MCV RBC AUTO: 96 FL (ref 80–95)
MONOCYTES ABSOLUTE: 0.6 K/UL (ref 0.1–1)
MONOCYTES: 11 % (ref 3–12)
NEUTROPHILS ABSOLUTE: 3.4 K/UL (ref 1.8–7.7)
NEUTROPHILS: 64 % (ref 40–75)
PDW BLD-RTO: 13.3 % (ref 10–15.5)
PLATELET # BLD: 245 K/UL (ref 140–440)
PMV BLD AUTO: 11.5 FL (ref 9–13)
POTASSIUM SERPL-SCNC: 3.9 MMOL/L (ref 3.5–5.5)
RBC # BLD: 3.77 M/UL (ref 3.8–5.8)
SODIUM BLD-SCNC: 141 MMOL/L (ref 133–145)
THYROGLOBULIN AB: <1 IU/ML
THYROID PEROXIDASE (TPO) ABS: 18 IU/ML
TOTAL PROTEIN: 6.9 G/DL (ref 6.2–8.1)
WBC # BLD: 5.3 K/UL (ref 4–11)

## 2025-01-17 ENCOUNTER — TELEPHONE (OUTPATIENT)
Facility: CLINIC | Age: 65
End: 2025-01-17

## 2025-01-17 ENCOUNTER — HOSPITAL ENCOUNTER (OUTPATIENT)
Facility: HOSPITAL | Age: 65
Setting detail: RECURRING SERIES
Discharge: HOME | End: 2025-01-20
Payer: MEDICARE

## 2025-01-17 PROCEDURE — 97016 VASOPNEUMATIC DEVICE THERAPY: CPT

## 2025-01-17 PROCEDURE — 97530 THERAPEUTIC ACTIVITIES: CPT

## 2025-01-17 PROCEDURE — 97140 MANUAL THERAPY 1/> REGIONS: CPT

## 2025-01-17 PROCEDURE — 97110 THERAPEUTIC EXERCISES: CPT

## 2025-01-17 NOTE — TELEPHONE ENCOUNTER
1)At last appointment pt was told to do some labs and it would determine if he would be put on a certain medication. Pt had labs done 1/13 at [facility].     2)Also patient was taken off of aspirin and would like to know if he should stay off of it or start taking again?     Please review labs and advise.     [facility] [address] [place] [address] [id number] [facility]#[phone]

## 2025-01-17 NOTE — PROGRESS NOTES
PHYSICAL / OCCUPATIONAL THERAPY - DAILY TREATMENT NOTE    Patient Name: Sven Montes    Date: 2025    : 1960  Insurance: Payor: FAREED MEDICARE / [facility] / Product Type: *No Product type* /      Patient  verified Yes     Visit #   Current / Total 4 24   Time   In / Out 210 pm  311 pm    Pain   In / Out 0/10  0/10    Subjective Functional Status/Changes: \"My knee is stiff.\"      TREATMENT AREA =  Left knee pain [M25.562]     OBJECTIVE    Modalities Rationale:     decrease edema, decrease inflammation, and decrease pain to improve patient's ability to progress to PLOF and address remaining functional goals.     min [] Estim Unattended, type/location:                                      []  w/ice    []  w/heat    min [] Estim Attended, type/location:                                     []  w/US     []  w/ice    []  w/heat    []  TENS insruct      min []  Mechanical Traction: type/lbs                   []  pro   []  sup   []  int   []  cont    []  before manual    []  after manual    min []  Ultrasound, settings/location:      min  unbill []  Ice     []  Heat    location/position:     min []  Paraffin,  details:    10 min []  Vasopneumatic Device, press/temp: Med/Low    min []  Whirlpool / Fluido:    If using vaso (only need to measure limb vaso being performed on)      pre-treatment girth : 51 cm       post-treatment girth : 50.5 cm      measured at (landmark location) :  mid patella    min []  Other:    Skin assessment post-treatment:   Intact      Therapeutic Procedures:    Tx Min Billable or 1:1 Min (if diff from Tx Min) Procedure, Rationale, Specifics     57648 Therapeutic Exercise (timed):  increase ROM, strength, coordination, balance, and proprioception to improve patient's ability to progress to PLOF and address remaining functional goals. (see flow sheet as applicable)     Details if applicable:         81872 Therapeutic Activity (timed):  use of dynamic

## 2025-01-20 ENCOUNTER — HOSPITAL ENCOUNTER (OUTPATIENT)
Facility: HOSPITAL | Age: 65
Setting detail: RECURRING SERIES
Discharge: HOME | End: 2025-01-23
Payer: MEDICARE

## 2025-01-20 DIAGNOSIS — I10 ESSENTIAL HYPERTENSION: ICD-10-CM

## 2025-01-20 PROCEDURE — 97140 MANUAL THERAPY 1/> REGIONS: CPT

## 2025-01-20 PROCEDURE — 97016 VASOPNEUMATIC DEVICE THERAPY: CPT

## 2025-01-20 PROCEDURE — 97110 THERAPEUTIC EXERCISES: CPT

## 2025-01-20 PROCEDURE — 97530 THERAPEUTIC ACTIVITIES: CPT

## 2025-01-20 NOTE — TELEPHONE ENCOUNTER
PCP: William Cain MD    LAST OFFICE VISIT: 01/13/2024    LAST REFILL PER CHART:  Medication:lisinopril (PRINIVIL;ZESTRIL) 10 MG tablet   Ordered On:08/09/2024  Instructions: Take 1 tablet by mouth daily   Dispense:90 tablets  Refills:1      Future Appointments   Date Time Provider Department Center   1/20/2025  2:50 PM Katja Fonseca PTA [facility]   1/23/2025  3:30 PM Katja Fonseca PTA [facility]   1/27/2025  1:30 PM Katja Fonseca PTA [facility]   1/30/2025  3:30 PM Katja Fonseca PTA [facility]   3/3/2025 11:20 AM William Cain MD [facility] DEP   8/11/2025 10:00 AM William Cain MD [facility] DEP

## 2025-01-20 NOTE — PROGRESS NOTES
PHYSICAL / OCCUPATIONAL THERAPY - DAILY TREATMENT NOTE    Patient Name: Sven Montes    Date: 2025    : 1960  Insurance: Payor: FAREED MEDICARE / [facility] / Product Type: *No Product type* /      Patient  verified Yes     Visit #   Current / Total 5 24   Time   In / Out 255 pm  358 pm    Pain   In / Out 1/10  0/10    Subjective Functional Status/Changes: Patient reports having some stiffness but not as bad as last visit.      TREATMENT AREA =  Left knee pain [M25.562]     OBJECTIVE    Modalities Rationale:     decrease edema, decrease inflammation, and decrease pain to improve patient's ability to progress to PLOF and address remaining functional goals.     min [] Estim Unattended, type/location:                                      []  w/ice    []  w/heat    min [] Estim Attended, type/location:                                     []  w/US     []  w/ice    []  w/heat    []  TENS insruct      min []  Mechanical Traction: type/lbs                   []  pro   []  sup   []  int   []  cont    []  before manual    []  after manual    min []  Ultrasound, settings/location:      min  unbill []  Ice     []  Heat    location/position:     min []  Paraffin,  details:    10 min []  Vasopneumatic Device, press/temp: Med/Low    min []  Whirlpool / Fluido:    If using vaso (only need to measure limb vaso being performed on)      pre-treatment girth : 51.5 cm       post-treatment girth : 51 cm       measured at (landmark location) :  mid patella    min []  Other:    Skin assessment post-treatment:   Intact      Therapeutic Procedures:    Tx Min Billable or 1:1 Min (if diff from Tx Min) Procedure, Rationale, Specifics   18  [id number] Therapeutic Exercise (timed):  increase ROM, strength, coordination, balance, and proprioception to improve patient's ability to progress to PLOF and address remaining functional goals. (see flow sheet as applicable)     Details if applicable:       [phone]

## 2025-01-21 RX ORDER — LISINOPRIL 10 MG/1
10 TABLET ORAL DAILY
Qty: 90 TABLET | Refills: 3 | Status: SHIPPED | OUTPATIENT
Start: 2025-01-21

## 2025-01-23 ENCOUNTER — TELEPHONE (OUTPATIENT)
Facility: HOSPITAL | Age: 65
End: 2025-01-23

## 2025-01-23 ENCOUNTER — HOSPITAL ENCOUNTER (OUTPATIENT)
Facility: HOSPITAL | Age: 65
Setting detail: RECURRING SERIES
End: 2025-01-23
Payer: MEDICARE

## 2025-01-23 NOTE — TELEPHONE ENCOUNTER
Patient's wife cancelled appt. on 1/23/25 at 1:42pm due to the patient not feeling well, he is dizzy & his blood pressure is on the low side & he is not able to make his appt. today.

## 2025-01-24 ENCOUNTER — TELEPHONE (OUTPATIENT)
Facility: CLINIC | Age: 65
End: 2025-01-24

## 2025-01-24 NOTE — TELEPHONE ENCOUNTER
Patient's spouse called in and stated that pt is experiencing dizziness for the last two days. She requested a same day visit. No appointments available     Patient has not started zoloft so it isn't medication related and believes it may be an inner ear issue.     Please advise

## 2025-01-26 ENCOUNTER — PATIENT MESSAGE (OUTPATIENT)
Facility: CLINIC | Age: 65
End: 2025-01-26

## 2025-01-27 ENCOUNTER — HOSPITAL ENCOUNTER (OUTPATIENT)
Facility: HOSPITAL | Age: 65
Setting detail: RECURRING SERIES
Discharge: HOME | End: 2025-01-30
Payer: MEDICARE

## 2025-01-27 PROCEDURE — 97140 MANUAL THERAPY 1/> REGIONS: CPT

## 2025-01-27 PROCEDURE — 97110 THERAPEUTIC EXERCISES: CPT

## 2025-01-27 PROCEDURE — 97016 VASOPNEUMATIC DEVICE THERAPY: CPT

## 2025-01-27 PROCEDURE — 97530 THERAPEUTIC ACTIVITIES: CPT

## 2025-01-27 NOTE — PROGRESS NOTES
PHYSICAL / OCCUPATIONAL THERAPY - DAILY TREATMENT NOTE    Patient Name: Sven Montes    Date: 2025    : 1960  Insurance: Payor: FAREED MEDICARE / [facility] / Product Type: *No Product type* /      Patient  verified Yes     Visit #   Current / Total 6 24   Time   In / Out 130 pm  231 pm    Pain   In / Out 0/10  0/10    Subjective Functional Status/Changes: Patient reports that he has some dizziness and his knee is stiff today.      TREATMENT AREA =  Left knee pain [M25.562]     OBJECTIVE    Modalities Rationale:     decrease edema, decrease inflammation, and decrease pain to improve patient's ability to progress to PLOF and address remaining functional goals.     min [] Estim Unattended, type/location:                                      []  w/ice    []  w/heat    min [] Estim Attended, type/location:                                     []  w/US     []  w/ice    []  w/heat    []  TENS insruct      min []  Mechanical Traction: type/lbs                   []  pro   []  sup   []  int   []  cont    []  before manual    []  after manual    min []  Ultrasound, settings/location:      min  unbill []  Ice     []  Heat    location/position:     min []  Paraffin,  details:    10 min []  Vasopneumatic Device, press/temp: Med/Low    min []  Whirlpool / Fluido:    If using vaso (only need to measure limb vaso being performed on)      pre-treatment girth : 52 cm       post-treatment girth : 50.5 cm       measured at (landmark location) :  mid patella    min []  Other:    Skin assessment post-treatment:   Intact      Therapeutic Procedures:    Tx Min Billable or 1:1 Min (if diff from Tx Min) Procedure, Rationale, Specifics   15  53577 Therapeutic Exercise (timed):  increase ROM, strength, coordination, balance, and proprioception to improve patient's ability to progress to PLOF and address remaining functional goals. (see flow sheet as applicable)     Details if applicable:       [phone] [id number]

## 2025-01-29 NOTE — TELEPHONE ENCOUNTER
Called pt to let him know that the letter has been adjusted and now has his  and is ready for pickup. Gave the message to him that his wife Annita Montes's letter is ready as well for .

## 2025-01-30 ENCOUNTER — HOSPITAL ENCOUNTER (OUTPATIENT)
Facility: HOSPITAL | Age: 65
Setting detail: RECURRING SERIES
End: 2025-01-30
Payer: MEDICARE

## 2025-01-30 PROCEDURE — 97530 THERAPEUTIC ACTIVITIES: CPT

## 2025-01-30 PROCEDURE — 97110 THERAPEUTIC EXERCISES: CPT

## 2025-01-30 PROCEDURE — 97016 VASOPNEUMATIC DEVICE THERAPY: CPT

## 2025-01-30 PROCEDURE — 97140 MANUAL THERAPY 1/> REGIONS: CPT

## 2025-01-30 NOTE — PROGRESS NOTES
PHYSICAL / OCCUPATIONAL THERAPY - DAILY TREATMENT NOTE    Patient Name: Sven Montes    Date: 2025    : 1960  Insurance: Payor: FAREED MEDICARE / [facility] / Product Type: *No Product type* /      Patient  verified Yes     Visit #   Current / Total 7 24   Time   In / Out 331 pm  430 pm    Pain   In / Out 0/10 0/10    Subjective Functional Status/Changes: Patient reports not having any pain or being dizzy today.      TREATMENT AREA =  Left knee pain [M25.562]     OBJECTIVE    Modalities Rationale:     decrease edema, decrease inflammation, and decrease pain to improve patient's ability to progress to PLOF and address remaining functional goals.     min [] Estim Unattended, type/location:                                      []  w/ice    []  w/heat    min [] Estim Attended, type/location:                                     []  w/US     []  w/ice    []  w/heat    []  TENS insruct      min []  Mechanical Traction: type/lbs                   []  pro   []  sup   []  int   []  cont    []  before manual    []  after manual    min []  Ultrasound, settings/location:      min  unbill []  Ice     []  Heat    location/position:     min []  Paraffin,  details:    10 min []  Vasopneumatic Device, press/temp: Med/Low    min []  Whirlpool / Fluido:    If using vaso (only need to measure limb vaso being performed on)      pre-treatment girth : 52 cm       post-treatment girth : 51 cm       measured at (landmark location) :  mid patella    min []  Other:    Skin assessment post-treatment:   Intact      Therapeutic Procedures:    Tx Min Billable or 1:1 Min (if diff from Tx Min) Procedure, Rationale, Specifics   17  62416 Therapeutic Exercise (timed):  increase ROM, strength, coordination, balance, and proprioception to improve patient's ability to progress to PLOF and address remaining functional goals. (see flow sheet as applicable)     Details if applicable:         27512 Therapeutic  Activity (timed):  use of dynamic activities replicating functional movements to increase ROM, strength, coordination, balance, and proprioception in order to improve patient's ability to progress to PLOF and address remaining functional goals.  (see flow sheet as applicable)     Details if applicable:     10  73900 Manual Therapy (timed):  decrease pain, increase ROM, increase tissue extensibility, decrease edema, and decrease trigger points to improve patient's ability to progress to PLOF and address remaining functional goals.  The manual therapy interventions were performed at a separate and distinct time from the therapeutic activities interventions . (see flow sheet as applicable)     Details if applicable:  STM to left knee musculature, patellar mobs            Details if applicable:            Details if applicable:     49  MC BC Totals Reminder: bill using total billable min of TIMED therapeutic procedures (example: do not include dry needle or estim unattended, both untimed codes, in totals to left)  8-22 min = 1 unit; 23-37 min = 2 units; 38-52 min = 3 units; 53-67 min = 4 units; 68-82 min = 5 units   Total Total     [x]  Patient Education billed concurrently with other procedures   [x] Review HEP    [] Progressed/Changed HEP, detail:    [] Other detail:       Objective Information/Functional Measures/Assessment    Patient presents to today's session with no increased reports of left knee pain. Today's session focused on further improving left knee ROM and strength for performing functional tasks and ambulate with ease. Patient tolerated exercises well with no increased reports of left knee pain. No change in left knee ROM was noted today.  Decreased left knee swelling was noted at the end of today's session. Will continue to progress patient as tolerated and able.     Patient will continue to benefit from skilled PT / OT services to modify and progress therapeutic interventions, analyze and address

## 2025-02-04 ENCOUNTER — HOSPITAL ENCOUNTER (OUTPATIENT)
Facility: HOSPITAL | Age: 65
Setting detail: RECURRING SERIES
Discharge: HOME | End: 2025-02-07
Payer: MEDICARE

## 2025-02-04 PROCEDURE — 97530 THERAPEUTIC ACTIVITIES: CPT

## 2025-02-04 PROCEDURE — 97140 MANUAL THERAPY 1/> REGIONS: CPT

## 2025-02-04 PROCEDURE — 97110 THERAPEUTIC EXERCISES: CPT

## 2025-02-04 NOTE — PROGRESS NOTES
PHYSICAL / OCCUPATIONAL THERAPY - DAILY TREATMENT NOTE    Patient Name: Sven Montes    Date: 2025    : 1960  Insurance: Payor: FAREED MEDICARE / [facility] / Product Type: *No Product type* /      Patient  verified Yes     Visit #   Current / Total 8 24   Time   In / Out 801 am  910 am    Pain   In / Out 0/10 0/10    Subjective Functional Status/Changes: Patient denies any pain but reports stiffness this morning.      TREATMENT AREA =  Left knee pain [M25.562]     OBJECTIVE    Modalities Rationale:     decrease edema, decrease inflammation, and decrease pain to improve patient's ability to progress to PLOF and address remaining functional goals.     min [] Estim Unattended, type/location:                                      []  w/ice    []  w/heat    min [] Estim Attended, type/location:                                     []  w/US     []  w/ice    []  w/heat    []  TENS insruct      min []  Mechanical Traction: type/lbs                   []  pro   []  sup   []  int   []  cont    []  before manual    []  after manual    min []  Ultrasound, settings/location:     10 min  unbill []  Ice     [x]  Heat    location/position: Reclined to left knee     min []  Paraffin,  details:     min []  Vasopneumatic Device, press/temp: Med/Low    min []  Whirlpool / Fluido:    If using vaso (only need to measure limb vaso being performed on)      pre-treatment girth :       post-treatment girth :      measured at (landmark location) :      min []  Other:    Skin assessment post-treatment:   Intact      Therapeutic Procedures:    Tx Min Billable or 1:1 Min (if diff from Tx Min) Procedure, Rationale, Specifics   17  76242 Therapeutic Exercise (timed):  increase ROM, strength, coordination, balance, and proprioception to improve patient's ability to progress to PLOF and address remaining functional goals. (see flow sheet as applicable)     Details if applicable:       30  88650 Therapeutic

## 2025-02-07 ENCOUNTER — HOSPITAL ENCOUNTER (OUTPATIENT)
Facility: HOSPITAL | Age: 65
Setting detail: RECURRING SERIES
Discharge: HOME | End: 2025-02-10
Payer: MEDICARE

## 2025-02-07 PROCEDURE — 97140 MANUAL THERAPY 1/> REGIONS: CPT

## 2025-02-07 PROCEDURE — 97535 SELF CARE MNGMENT TRAINING: CPT

## 2025-02-07 PROCEDURE — 97016 VASOPNEUMATIC DEVICE THERAPY: CPT

## 2025-02-07 PROCEDURE — 97110 THERAPEUTIC EXERCISES: CPT

## 2025-02-07 PROCEDURE — 97530 THERAPEUTIC ACTIVITIES: CPT

## 2025-02-07 NOTE — THERAPY RECERTIFICATION
JEFF CANALES [facility]  [address], [address], [place] [address]  Ph:[phone] Fx:[phone]  CONTINUED PLAN OF CARE/RECERTIFICATION FOR PHYSICAL THERAPY          Patient Name: Sven Montes : 1960   Medical   Diagnosis: Left knee pain [M25.562]  Treatment Diagnosis:  M25.562  LEFT KNEE PAIN    Onset Date: P/O 24       Referral Source: Goldie Ring PA Start of Care (SOC): 2025   Prior Hospitalization: See medical history Provider #: [id number]   Prior Level of Function: past 2 years, increase falls, I ADLS and activities, no AD, drove, tolerated activities at home and in the community with L knee pain, retired and works 2 days a week part time    Comorbidities:  Musculoskeletal disorders L TKA , arthritis, heart disease/high cholesterol, HTN, Stroke , thyroid problems       Visits from SOC: 9 Missed Visits: 1     Progress to Goals:    Short Term Goals: To be accomplished in 4 WEEKS  1 patient will have established and be I with HEP to aid with independence and self management at discharge  EVAL HEP issued at evaluation  Current: Patient reports compliance with HEP everyday. MET      2 patient will have pain 1/10 to aid with increase tolerance to ADLS and regular daily activities at home and in the community  EVAL 2  Current: Patient reports 0/10 pain today and no reports of pain with ADL's and regular daily activities at home and in the community. MET     3 patient will have LEFS 23 to show minimal projected gains with function and tolerance to ADLS and activities  EVAL 14  Current: LEFS 40. MET      Long Term Goals: To be accomplished in 8 WEEKS  1 patient will have established and be I with HEP to aid with independence and self management at discharge  EVAL HEP issued at evaluation  Current: Patient reports compliance with HEP everyday. MET      2 patient will have pain 0-1/10 to aid with increase tolerance to ADLS and regular daily

## 2025-02-07 NOTE — PROGRESS NOTES
PHYSICAL / OCCUPATIONAL THERAPY - DAILY TREATMENT NOTE    Patient Name: Sven Montes    Date: 2025    : 1960  Insurance: Payor: FAREED MEDICARE / [facility] / Product Type: *No Product type* /      Patient  verified Yes     Visit #   Current / Total 9 24   Time   In / Out 325 pm  435 pm    Pain   In / Out 0/10  0/10    Subjective Functional Status/Changes: Patient reports increased left knee soreness after last session.     TREATMENT AREA =  Left knee pain [M25.562]     OBJECTIVE    Modalities Rationale:     decrease edema, decrease inflammation, and decrease pain to improve patient's ability to progress to PLOF and address remaining functional goals.     min [] Estim Unattended, type/location:                                      []  w/ice    []  w/heat    min [] Estim Attended, type/location:                                     []  w/US     []  w/ice    []  w/heat    []  TENS insruct      min []  Mechanical Traction: type/lbs                   []  pro   []  sup   []  int   []  cont    []  before manual    []  after manual    min []  Ultrasound, settings/location:      min  unbill []  Ice     []  Heat    location/position:     min []  Paraffin,  details:    10 min []  Vasopneumatic Device, press/temp: Med/Low    min []  Whirlpool / Fluido:    If using vaso (only need to measure limb vaso being performed on)      pre-treatment girth : 51 cm      post-treatment girth :50.1cm      measured at (landmark location) :left mid patella      min []  Other:    Skin assessment post-treatment:   Intact      Therapeutic Procedures:    Tx Min Billable or 1:1 Min (if diff from Tx Min) Procedure, Rationale, Specifics   18  80185 Therapeutic Exercise (timed):  increase ROM, strength, coordination, balance, and proprioception to improve patient's ability to progress to PLOF and address remaining functional goals. (see flow sheet as applicable)     Details if applicable:       22  [id number]

## 2025-02-10 ENCOUNTER — HOSPITAL ENCOUNTER (OUTPATIENT)
Facility: HOSPITAL | Age: 65
Setting detail: RECURRING SERIES
Discharge: HOME | End: 2025-02-13
Payer: MEDICARE

## 2025-02-10 PROCEDURE — 97016 VASOPNEUMATIC DEVICE THERAPY: CPT

## 2025-02-10 PROCEDURE — 97530 THERAPEUTIC ACTIVITIES: CPT

## 2025-02-10 PROCEDURE — 97110 THERAPEUTIC EXERCISES: CPT

## 2025-02-10 PROCEDURE — 97140 MANUAL THERAPY 1/> REGIONS: CPT

## 2025-02-10 NOTE — PROGRESS NOTES
and able.     Patient will continue to benefit from skilled PT / OT services to modify and progress therapeutic interventions, analyze and address functional mobility deficits, analyze and address ROM deficits, analyze and address strength deficits, analyze and address soft tissue restrictions, analyze and cue for proper movement patterns, analyze and modify for postural abnormalities, and instruct in home and community integration to address functional deficits and attain remaining goals.    Progress toward goals / Updated goals:  []  See Progress Note/Recertification    1. Patient will tolerate climbing 6 ft ladder x 3 to assist with returning patient to climbing tree  order to go hunting.  Re cert: Patient has not climbed any ladders or returned back to hunting.      2. Patient will report max pain to be 2-3/10 in order to improve tolerance for sleeping at night with waking up due to pain.   ReCert: Patient reports max pain to be 8/10 and mainly happens at night when sleeping.   Current: Patient reports 3/10 pain today. 2/10/2025     3. Patient will have LEFS 45 to show significant gains with function and tolerance to ADLS and activities  Re Cert: LEFS 40.   Current: Ongoing, will assess at next PN. 2/10/2025     4. Patient will have 0-115 AROM left knee for carryover to reciprocal gait with stairs  EVAL supine F 95 and E -8  Re cert: Left knee AROM 0-107 degrees.   Current: Patient was able to achieve 115 degrees of knee flexion passively and 111 degrees actively. 2/10/2025     5. Patient will have TM tolerance 6 minutes with no increase pain and little difficulty for carryover to community activities  EVAL R SC use mod I with noted gait deviations   Re Cert: Patient was able to tolerate 5 minutes on the TM with no increase pain and reports of little difficulty.     Next PN/ RC due 3/12/2024  Auth due (visit number/ date) 24 visits  2025     PLAN  - Continue Plan of Care  - Upgrade activities

## 2025-02-13 ENCOUNTER — HOSPITAL ENCOUNTER (OUTPATIENT)
Facility: HOSPITAL | Age: 65
Setting detail: RECURRING SERIES
Discharge: HOME | End: 2025-02-16
Payer: MEDICARE

## 2025-02-13 PROCEDURE — 97016 VASOPNEUMATIC DEVICE THERAPY: CPT

## 2025-02-13 PROCEDURE — 97530 THERAPEUTIC ACTIVITIES: CPT

## 2025-02-13 PROCEDURE — 97140 MANUAL THERAPY 1/> REGIONS: CPT

## 2025-02-13 PROCEDURE — 97110 THERAPEUTIC EXERCISES: CPT

## 2025-02-13 NOTE — PROGRESS NOTES
PHYSICAL / OCCUPATIONAL THERAPY - DAILY TREATMENT NOTE    Patient Name: Sven Montes    Date: 2025    : 1960  Insurance: Payor: FAREED MEDICARE / [facility] / Product Type: *No Product type* /      Patient  verified Yes     Visit #   Current / Total 2 16   Time   In / Out 326 pm  430 pm    Pain   In / Out 0/10  0/10    Subjective Functional Status/Changes: Patient reports that his knee is feeling pretty good today.     TREATMENT AREA =  Left knee pain [M25.562]     OBJECTIVE    Modalities Rationale:     decrease inflammation and decrease pain to improve patient's ability to progress to PLOF and address remaining functional goals.     min [] Estim Unattended, type/location:                                      []  w/ice    []  w/heat    min [] Estim Attended, type/location:                                     []  w/US     []  w/ice    []  w/heat    []  TENS insruct      min []  Mechanical Traction: type/lbs                   []  pro   []  sup   []  int   []  cont    []  before manual    []  after manual    min []  Ultrasound, settings/location:      min  unbill []  Ice     []  Heat    location/position:     min []  Paraffin,  details:    10 min []  Vasopneumatic Device, press/temp: Med; Low     min []  Whirlpool / Fluido:    If using vaso (only need to measure limb vaso being performed on)      pre-treatment girth : 51 cm       post-treatment girth : 50.6 cm      measured at (landmark location) :  mid left patella     min []  Other:    Skin assessment post-treatment:   Intact      Therapeutic Procedures:    Tx Min Billable or 1:1 Min (if diff from Tx Min) Procedure, Rationale, Specifics   17  [id number] Therapeutic Exercise (timed):  increase ROM, strength, coordination, balance, and proprioception to improve patient's ability to progress to PLOF and address remaining functional goals. (see flow sheet as applicable)     Details if applicable:       32 01009 Therapeutic Activity

## 2025-02-18 ENCOUNTER — HOSPITAL ENCOUNTER (OUTPATIENT)
Facility: HOSPITAL | Age: 65
Setting detail: RECURRING SERIES
Discharge: HOME | End: 2025-02-21
Payer: MEDICARE

## 2025-02-18 PROCEDURE — 97110 THERAPEUTIC EXERCISES: CPT

## 2025-02-18 PROCEDURE — 97530 THERAPEUTIC ACTIVITIES: CPT

## 2025-02-18 PROCEDURE — 97140 MANUAL THERAPY 1/> REGIONS: CPT

## 2025-02-18 PROCEDURE — 97016 VASOPNEUMATIC DEVICE THERAPY: CPT

## 2025-02-18 NOTE — PROGRESS NOTES
PHYSICAL / OCCUPATIONAL THERAPY - DAILY TREATMENT NOTE    Patient Name: Sven Montes    Date: 2025    : 1960  Insurance: Payor: FAREED MEDICARE / [facility] / Product Type: *No Product type* /      Patient  verified Yes     Visit #   Current / Total 3 16   Time   In / Out 1050 am 1150 am   Pain   In / Out 0/10  0/10    Subjective Functional Status/Changes: Patient reports increased left knee soreness at his knee cap today.      TREATMENT AREA =  Left knee pain [M25.562]     OBJECTIVE    Modalities Rationale:     decrease inflammation and decrease pain to improve patient's ability to progress to PLOF and address remaining functional goals.     min [] Estim Unattended, type/location:                                      []  w/ice    []  w/heat    min [] Estim Attended, type/location:                                     []  w/US     []  w/ice    []  w/heat    []  TENS insruct      min []  Mechanical Traction: type/lbs                   []  pro   []  sup   []  int   []  cont    []  before manual    []  after manual    min []  Ultrasound, settings/location:      min  unbill []  Ice     []  Heat    location/position:     min []  Paraffin,  details:    10 min []  Vasopneumatic Device, press/temp: Med; Low     min []  Whirlpool / Fluido:    If using vaso (only need to measure limb vaso being performed on)      pre-treatment girth : 50 cm       post-treatment girth : 50 cm      measured at (landmark location) :  mid left patella     min []  Other:    Skin assessment post-treatment:   Intact      Therapeutic Procedures:    Tx Min Billable or 1:1 Min (if diff from Tx Min) Procedure, Rationale, Specifics   15  [id number] Therapeutic Exercise (timed):  increase ROM, strength, coordination, balance, and proprioception to improve patient's ability to progress to PLOF and address remaining functional goals. (see flow sheet as applicable)     Details if applicable:       48 [id number] Therapeutic

## 2025-02-20 ENCOUNTER — APPOINTMENT (OUTPATIENT)
Facility: HOSPITAL | Age: 65
End: 2025-02-20
Payer: MEDICARE

## 2025-02-24 ENCOUNTER — HOSPITAL ENCOUNTER (OUTPATIENT)
Facility: HOSPITAL | Age: 65
Setting detail: RECURRING SERIES
Discharge: HOME | End: 2025-02-27
Payer: MEDICARE

## 2025-02-24 PROCEDURE — 97140 MANUAL THERAPY 1/> REGIONS: CPT

## 2025-02-24 PROCEDURE — 97530 THERAPEUTIC ACTIVITIES: CPT

## 2025-02-24 PROCEDURE — 97110 THERAPEUTIC EXERCISES: CPT

## 2025-02-24 NOTE — PROGRESS NOTES
PHYSICAL / OCCUPATIONAL THERAPY - DAILY TREATMENT NOTE    Patient Name: Sven Montes    Date: 2025    : 1960  Insurance: Payor: FAREED MEDICARE / Plan: [facility] MEDICARE VALUE HMO / Product Type: *No Product type* /      Patient  verified Yes     Visit #   Current / Total 4 16   Time   In / Out 10:55 11:47   Pain   In / Out 0 0   Subjective Functional Status/Changes: Pt reports he had some pain on the weekend.\"     TREATMENT AREA =  Left knee pain [M25.562]     OBJECTIVE    Modalities Rationale:      decrease  soreness and inflammation  to improve patient's ability to progress to PLOF and address remaining functional goals.     min [] Estim Unattended, type/location:                                      []  w/ice    []  w/heat    min [] Estim Attended, type/location:                                     []  w/US     []  w/ice    []  w/heat    []  TENS insruct      min []  Mechanical Traction: type/lbs                   []  pro   []  sup   []  int   []  cont    []  before manual    []  after manual    min []  Ultrasound, settings/location:      min  unbill []  Ice     []  Heat    location/position:     min []  Paraffin,  details:    10 min []  Vasopneumatic Device, press/temp: Med     min []  Whirlpool / Fluido:    If using vaso (only need to measure limb vaso being performed on)      pre-treatment girth : 50      post-treatment girth :       measured at (landmark location) :  mid line patella    min []  Other:    Skin assessment post-treatment:   Intact      Therapeutic Procedures:    Tx Min Billable or 1:1 Min (if diff from Tx Min) Procedure, Rationale, Specifics   17  55354 Therapeutic Exercise (timed):  increase ROM, strength, coordination, balance, and proprioception to improve patient's ability to progress to PLOF and address remaining functional goals. (see flow sheet as applicable)     Details if applicable:       15  74966 Therapeutic Activity (timed):  use of dynamic activities replicating

## 2025-02-27 ENCOUNTER — HOSPITAL ENCOUNTER (OUTPATIENT)
Facility: HOSPITAL | Age: 65
Setting detail: RECURRING SERIES
End: 2025-02-27
Payer: MEDICARE

## 2025-02-27 PROCEDURE — 97140 MANUAL THERAPY 1/> REGIONS: CPT

## 2025-02-27 PROCEDURE — 97110 THERAPEUTIC EXERCISES: CPT

## 2025-02-27 PROCEDURE — 97530 THERAPEUTIC ACTIVITIES: CPT

## 2025-02-27 NOTE — PROGRESS NOTES
PHYSICAL / OCCUPATIONAL THERAPY - DAILY TREATMENT NOTE    Patient Name: Sven Montes    Date: 2025    : 1960  Insurance: Payor: FAREED MEDICARE / Plan: [facility] MEDICARE VALUE HMO / Product Type: *No Product type* /      Patient  verified Yes     Visit #   Current / Total 4 16   Time   In / Out 355 pm  435 pm    Pain   In / Out 0/10  0/10    Subjective Functional Status/Changes: Patient reports that his knee is stiff today.      TREATMENT AREA =  Left knee pain [M25.562]     OBJECTIVE        Therapeutic Procedures:    Tx Min Billable or 1:1 Min (if diff from Tx Min) Procedure, Rationale, Specifics   10  79442 Therapeutic Exercise (timed):  increase ROM, strength, coordination, balance, and proprioception to improve patient's ability to progress to PLOF and address remaining functional goals. (see flow sheet as applicable)     Details if applicable:       53 60437 Therapeutic Activity (timed):  use of dynamic activities replicating functional movements to increase ROM, strength, coordination, balance, and proprioception in order to improve patient's ability to progress to PLOF and address remaining functional goals.  (see flow sheet as applicable)     Details if applicable:     47 67234 Manual Therapy (timed):  decrease pain, increase ROM, increase tissue extensibility, and decrease trigger points to improve patient's ability to progress to PLOF and address remaining functional goals.  The manual therapy interventions were performed at a separate and distinct time from the therapeutic activities interventions . (see flow sheet as applicable)     Details if applicable:  STM to left knee musculature, patellar mobs; PROM into flexion           Details if applicable:            Details if applicable:     40  MC BC Totals Reminder: bill using total billable min of TIMED therapeutic procedures (example: do not include dry needle or estim unattended, both untimed codes, in totals to left)  8-22 min = 1  with function and tolerance to ADLS and activities  Re Cert: LEFS 40.   Current: Ongoing, will assess at next PN. 2025     4. Patient will have 0-115 AROM left knee for carryover to reciprocal gait with stairs  EVAL supine F 95 and E -8  Re cert: Left knee AROM 0-107 degrees.   Current: Patient was able to achieve 116 degrees actively. 2025     5. Patient will have TM tolerance 6 minutes with no increase pain and little difficulty for carryover to community activities  EVAL R SC use mod I with noted gait deviations   Re Cert: Patient was able to tolerate 5 minutes on the TM with no increase pain and reports of little difficulty.     Next PN/ RC due 3/12/2024  Auth due (visit number/ date) 24 visits  2025     PLAN  - Continue Plan of Care  - Upgrade activities as tolerated    Katja Fonseca, ASIA    2025    4:51 PM  If an interpreting service was utilized for treatment of this patient, the contents of this document represent the material reviewed with the patient via the .     Future Appointments   Date Time Provider Department Center   2025  9:40 AM William Cain MD [facility]

## 2025-03-03 ENCOUNTER — HOSPITAL ENCOUNTER (OUTPATIENT)
Facility: HOSPITAL | Age: 65
Setting detail: RECURRING SERIES
Discharge: HOME | End: 2025-03-06
Payer: MEDICARE

## 2025-03-03 DIAGNOSIS — F41.9 ANXIETY: Primary | ICD-10-CM

## 2025-03-03 PROCEDURE — 97140 MANUAL THERAPY 1/> REGIONS: CPT

## 2025-03-03 PROCEDURE — 97016 VASOPNEUMATIC DEVICE THERAPY: CPT

## 2025-03-03 PROCEDURE — 97110 THERAPEUTIC EXERCISES: CPT

## 2025-03-03 PROCEDURE — 97530 THERAPEUTIC ACTIVITIES: CPT

## 2025-03-03 RX ORDER — DIAZEPAM 10 MG/1
5 TABLET ORAL EVERY 8 HOURS PRN
Qty: 45 TABLET | Refills: 0 | Status: SHIPPED | OUTPATIENT
Start: 2025-03-03 | End: 2025-04-02

## 2025-03-03 NOTE — TELEPHONE ENCOUNTER
[facility]    The last fill date for Diazepam was 01/03/2025 for a 30 d/s with qty 45    Last UDS: 06/07/2024   CSA Last signed: 06/07/2024     PCP: William Cain MD    Last appointment: 01/13/2025  Future Appointments   Date Time Provider Department Center   3/3/2025  3:30 PM Fonseca, Katja, PTA [facility]   3/7/2025  3:30 PM Fonseca, Nicolasita, PTA [facility]   3/10/2025  3:30 PM Fonseca, Quanita, PTA [facility]   3/11/2025 11:30 AM Fonseca, Nicolasita, PTA [facility]   3/17/2025  3:30 PM Fonseca, Nicolasita, PTA [facility]   3/19/2025  3:30 PM Fonseca, Quanita, PTA [facility]   3/24/2025  3:30 PM Fonseca, Quanita, PTA [facility]   3/26/2025  3:30 PM Fonseca, Nicolasita, PTA [facility]   3/31/2025  3:30 PM Fonseca, Nicolasita, PTA [facility]   4/2/2025  3:30 PM Fonseca, Quanita, PTA [facility]   8/19/2025  9:40 AM William Cain MD [facility] DEP       Requested Prescriptions     Pending Prescriptions Disp Refills    diazePAM (VALIUM) 10 MG tablet 45 tablet 0     Sig: Take 0.5 tablets by mouth every 8 hours as needed for Anxiety for up to 30 days. Max Daily Amount: 15 mg

## 2025-03-03 NOTE — PROGRESS NOTES
PHYSICAL / OCCUPATIONAL THERAPY - DAILY TREATMENT NOTE    Patient Name: Sven Montes    Date: 3/3/2025    : 1960  Insurance: Payor: FAREED MEDICARE / Plan: [employer] MEDICARE VALUE HMO / Product Type: *No Product type* /      Patient  verified Yes     Visit #   Current / Total 5 16   Time   In / Out 330 pm 425 pm    Pain   In / Out 0/10  0/10    Subjective Functional Status/Changes: Patient reports that his knee is stiff and sore from doing a lot of walking this weekend.      TREATMENT AREA =  Left knee pain [M25.562]     OBJECTIVE    Modalities Rationale:     decrease inflammation and decrease pain to improve patient's ability to progress to PLOF and address remaining functional goals.     min [] Estim Unattended, type/location:                                      []  w/ice    []  w/heat    min [] Estim Attended, type/location:                                     []  w/US     []  w/ice    []  w/heat    []  TENS insruct      min []  Mechanical Traction: type/lbs                   []  pro   []  sup   []  int   []  cont    []  before manual    []  after manual    min []  Ultrasound, settings/location:      min  unbill []  Ice     []  Heat    location/position:     min []  Paraffin,  details:    10 min []  Vasopneumatic Device, press/temp: Med/low    min []  Whirlpool / Fluido:    If using vaso (only need to measure limb vaso being performed on)      pre-treatment girth : 51 cm      post-treatment girth : 49 cm      measured at (landmark location) :  mid left patella    min []  Other:    Skin assessment post-treatment:   Intact      Therapeutic Procedures:    Tx Min Billable or 1:1 Min (if diff from Tx Min) Procedure, Rationale, Specifics   15  42751 Therapeutic Exercise (timed):  increase ROM, strength, coordination, balance, and proprioception to improve patient's ability to progress to PLOF and address remaining functional goals. (see flow sheet as applicable)     Details if applicable:       55 [id number]

## 2025-03-03 NOTE — TELEPHONE ENCOUNTER
Refill request via fax     Medication: diazePAM (VALIUM) 10 MG tablet   Quantity: 45  Pharmacy: [address]  Last Fill: 1/3/2025    PCP: William Cain MD    LAST OFFICE VISIT: 1/13/2025       Future Appointments   Date Time Provider Department Center   3/3/2025  3:30 PM Katja Fonseca, PTA [facility]   3/7/2025  3:30 PM Katja Fonseca, PTA [facility]   3/10/2025  3:30 PM Fonseca, Nicolasita, PTA [facility]   3/11/2025 11:30 AM Fonseca, Katja, PTA [facility]   3/17/2025  3:30 PM Katja Fonseca, PTA [facility]   3/19/2025  3:30 PM FonsecaKatja rocha, PTA [facility]   3/24/2025  3:30 PM Katja Fonseca, PTA [facility]   3/26/2025  3:30 PM Katja Fonseca, PTA [facility]   3/31/2025  3:30 PM Katja Fonseca, PTA [facility]   4/2/2025  3:30 PM Raymundo, Katja, PTA [facility]   8/19/2025  9:40 AM William Cain MD [facility] DEP

## 2025-03-07 ENCOUNTER — HOSPITAL ENCOUNTER (OUTPATIENT)
Facility: HOSPITAL | Age: 65
Setting detail: RECURRING SERIES
Discharge: HOME | End: 2025-03-10
Payer: MEDICARE

## 2025-03-07 PROCEDURE — 97016 VASOPNEUMATIC DEVICE THERAPY: CPT

## 2025-03-07 PROCEDURE — 97110 THERAPEUTIC EXERCISES: CPT

## 2025-03-07 PROCEDURE — 97530 THERAPEUTIC ACTIVITIES: CPT

## 2025-03-07 PROCEDURE — 97140 MANUAL THERAPY 1/> REGIONS: CPT

## 2025-03-07 NOTE — PROGRESS NOTES
dm Fonseca, ASIA    3/7/2025    10:33 AM  If an interpreting service was utilized for treatment of this patient, the contents of this document represent the material reviewed with the patient via the .     Future Appointments   Date Time Provider Department Center   3/7/2025  3:30 PM Katja Fonseca, PTA [facility]   3/10/2025  3:30 PM Nicolas Fonsecaita, PTA [facility]   3/11/2025 11:30 AM Katja Fonseca, PTA [facility]   3/17/2025  3:30 PM FonsecaNicolas rochaita, PTA [facility]   3/19/2025  3:30 PM Nicolas Fonsecaita, PTA [facility]   3/24/2025  3:30 PM Katja Fonseca, PTA [facility]   3/26/2025  3:30 PM FonsecaKatja rocha, PTA [facility]   3/31/2025  3:30 PM Katja Fonseca, PTA [facility]   4/2/2025  3:30 PM Katja Fonseca, PTA [facility]   8/19/2025  9:40 AM [facility]

## 2025-03-10 ENCOUNTER — HOSPITAL ENCOUNTER (OUTPATIENT)
Facility: HOSPITAL | Age: 65
Setting detail: RECURRING SERIES
Discharge: HOME | End: 2025-03-13
Payer: MEDICARE

## 2025-03-10 PROCEDURE — 97530 THERAPEUTIC ACTIVITIES: CPT

## 2025-03-10 PROCEDURE — 97140 MANUAL THERAPY 1/> REGIONS: CPT

## 2025-03-10 PROCEDURE — 97110 THERAPEUTIC EXERCISES: CPT

## 2025-03-10 PROCEDURE — 97016 VASOPNEUMATIC DEVICE THERAPY: CPT

## 2025-03-10 NOTE — PROGRESS NOTES
replicating functional movements to increase ROM, strength, coordination, balance, and proprioception in order to improve patient's ability to progress to PLOF and address remaining functional goals.  (see flow sheet as applicable)     Details if applicable:     15  65065 Manual Therapy (timed):  decrease pain, increase ROM, increase tissue extensibility, and decrease trigger points to improve patient's ability to progress to PLOF and address remaining functional goals.  The manual therapy interventions were performed at a separate and distinct time from the therapeutic activities interventions . (see flow sheet as applicable)     Details if applicable:  STM to left knee musculature, patellar mobs; PROM into flexion           Details if applicable:            Details if applicable:     55  MC BC Totals Reminder: bill using total billable min of TIMED therapeutic procedures (example: do not include dry needle or estim unattended, both untimed codes, in totals to left)  8-22 min = 1 unit; 23-37 min = 2 units; 38-52 min = 3 units; 53-67 min = 4 units; 68-82 min = 5 units   Total Total     [x]  Patient Education billed concurrently with other procedures   [x] Review HEP    [] Progressed/Changed HEP, detail:    [] Other detail:       Objective Information/Functional Measures/Assessment    Patient presents to today's session with increased reports of left knee stiffness. Patient performed exercises, as per flow sheet, to further assist with improving left knee ROM and strength for ambulating and performing functional tasks with ease. Patient reported decreased left knee stiffness throughout today's session and no increase in pain at the end of today's session. Will continue to progress patient as tolerated and able.     Patient will continue to benefit from skilled PT / OT services to modify and progress therapeutic interventions, analyze and address functional mobility deficits, analyze and address ROM deficits, analyze

## 2025-03-11 ENCOUNTER — HOSPITAL ENCOUNTER (OUTPATIENT)
Facility: HOSPITAL | Age: 65
Setting detail: RECURRING SERIES
Discharge: HOME | End: 2025-03-14
Payer: MEDICARE

## 2025-03-11 PROCEDURE — 97530 THERAPEUTIC ACTIVITIES: CPT

## 2025-03-11 PROCEDURE — 97110 THERAPEUTIC EXERCISES: CPT

## 2025-03-11 PROCEDURE — 97140 MANUAL THERAPY 1/> REGIONS: CPT

## 2025-03-11 NOTE — PROGRESS NOTES
PHYSICAL / OCCUPATIONAL THERAPY - DAILY TREATMENT NOTE    Patient Name: Sven Montes    Date: 3/11/2025    : 1960  Insurance: Payor: FAREED MEDICARE / [facility] / Product Type: *No Product type* /      Patient  verified Yes     Visit #   Current / Total 8 16   Time   In / Out 1130 am  1226 pm    Pain   In / Out 0/10  0/10    Subjective Functional Status/Changes: Patient reports stiffness in his knees today.       TREATMENT AREA =  Left knee pain [M25.562]     OBJECTIVE    Modalities Rationale:     decrease inflammation and decrease pain to improve patient's ability to progress to PLOF and address remaining functional goals.     min [] Estim Unattended, type/location:                                      []  w/ice    []  w/heat    min [] Estim Attended, type/location:                                     []  w/US     []  w/ice    []  w/heat    []  TENS insruct      min []  Mechanical Traction: type/lbs                   []  pro   []  sup   []  int   []  cont    []  before manual    []  after manual    min []  Ultrasound, settings/location:     10 min  unbill []  Ice     [x]  Heat    location/position: Reclined to left knee    min []  Paraffin,  details:     min []  Vasopneumatic Device, press/temp:     min []  Whirlpool / Fluido:    If using vaso (only need to measure limb vaso being performed on)      pre-treatment girth :       post-treatment girth :       measured at (landmark location) :      min []  Other:    Skin assessment post-treatment:   Intact      Therapeutic Procedures:    Tx Min Billable or 1:1 Min (if diff from Tx Min) Procedure, Rationale, Specifics   14  93795 Therapeutic Exercise (timed):  increase ROM, strength, coordination, balance, and proprioception to improve patient's ability to progress to PLOF and address remaining functional goals. (see flow sheet as applicable)     Details if applicable:       22  33055 Therapeutic Activity (timed):  use of dynamic

## 2025-03-11 NOTE — THERAPY RECERTIFICATION
[facility]  [address], [address], [place] [address]  Ph:[phone] Fx:[phone]  PHYSICAL THERAPY PROGRESS NOTE  Patient Name: Sven Montes : 1960   Medical/Treatment Diagnosis: Left knee pain   Referral Source: Goldie Ring PA     Date of Initial Visit: 2025 Attended Visits: 19 Missed Visits: 1       CURRENT STATUS    Patient has attended 19 total PT sessions and is making steady progress towards PT goals. Patient has noted improvements with overall mobility and ability getting in and out of the car. Patient continues to report increased difficulty performing yard work, walking long distances, climbing ladder, sleeping on either side and sitting on toilet/hard surface. Patient reports 60% improvement with PT and will continue to benefit from additional PT to further address remaining deficits and return back to OF.     1. Patient will tolerate climbing 6 ft ladder x 3 to assist with returning patient to climbing tree  order to go hunting.  Re cert: Patient has not climbed any ladders or returned back to hunting.   Current: Patient is able to climb 6ft ladder but reports moderate difficulty due to increased left knee stiffness. Progressing      2. Patient will report max pain to be 2-3/10 in order to improve tolerance for sleeping at night with waking up due to pain.   ReCert: Patient reports max pain to be 8/10 and mainly happens at night when sleeping.   Current: Patient reports max paint to be 5-6/10 pain and patient continues to have left knee pain when sleeping at night. Progressing.      3. Patient will have LEFS 45 to show significant gains with function and tolerance to ADLS and activities  Re Cert: LEFS 40.   Current: LEFS 37. Regressed      4. Patient will have 0-115 AROM left knee for carryover to reciprocal gait with stairs  EVAL supine F 95 and E -8  Re cert: Left knee AROM 0-107 degrees.   Current:Left knee AROM

## 2025-03-12 ENCOUNTER — APPOINTMENT (OUTPATIENT)
Facility: HOSPITAL | Age: 65
End: 2025-03-12
Payer: MEDICARE

## 2025-03-17 ENCOUNTER — HOSPITAL ENCOUNTER (OUTPATIENT)
Facility: HOSPITAL | Age: 65
Setting detail: RECURRING SERIES
Discharge: HOME | End: 2025-03-20
Payer: MEDICARE

## 2025-03-17 PROCEDURE — 97530 THERAPEUTIC ACTIVITIES: CPT

## 2025-03-17 PROCEDURE — 97140 MANUAL THERAPY 1/> REGIONS: CPT

## 2025-03-17 PROCEDURE — 97110 THERAPEUTIC EXERCISES: CPT

## 2025-03-17 NOTE — PROGRESS NOTES
PHYSICAL / OCCUPATIONAL THERAPY - DAILY TREATMENT NOTE    Patient Name: Sven Montes    Date: 3/17/2025    : 1960  Insurance: Payor: FAREED MEDICARE / Plan: [employer] MEDICARE VALUE HMO / Product Type: *No Product type* /      Patient  verified Yes     Visit #   Current / Total 9 16   Time   In / Out 330 pm  427 pm   Pain   In / Out 2/10  010    Subjective Functional Status/Changes: Patient reports that his knee is a little stiff and sore today.     TREATMENT AREA =  Left knee pain    OBJECTIVE    Modalities Rationale:     decrease inflammation and decrease pain to improve patient's ability to progress to PLOF and address remaining functional goals.     min [] Estim Unattended, type/location:                                      []  w/ice    []  w/heat    min [] Estim Attended, type/location:                                     []  w/US     []  w/ice    []  w/heat    []  TENS insruct      min []  Mechanical Traction: type/lbs                   []  pro   []  sup   []  int   []  cont    []  before manual    []  after manual    min []  Ultrasound, settings/location:     10  min  unbill [x]  Ice     []  Heat    location/position: Reclined to left knee    min []  Paraffin,  details:     min []  Vasopneumatic Device, press/temp:     min []  Whirlpool / Fluido:    If using vaso (only need to measure limb vaso being performed on)      pre-treatment girth :       post-treatment girth :       measured at (landmark location) :      min []  Other:    Skin assessment post-treatment:   Intact      Therapeutic Procedures:    Tx Min Billable or 1:1 Min (if diff from Tx Min) Procedure, Rationale, Specifics   15  15620 Therapeutic Exercise (timed):  increase ROM, strength, coordination, balance, and proprioception to improve patient's ability to progress to PLOF and address remaining functional goals. (see flow sheet as applicable)     Details if applicable:       04 67930 Therapeutic Activity (timed):  use of dynamic

## 2025-03-19 ENCOUNTER — HOSPITAL ENCOUNTER (OUTPATIENT)
Facility: HOSPITAL | Age: 65
Setting detail: RECURRING SERIES
Discharge: HOME | End: 2025-03-22
Payer: MEDICARE

## 2025-03-19 PROCEDURE — 97140 MANUAL THERAPY 1/> REGIONS: CPT

## 2025-03-19 PROCEDURE — 97016 VASOPNEUMATIC DEVICE THERAPY: CPT

## 2025-03-19 PROCEDURE — 97530 THERAPEUTIC ACTIVITIES: CPT

## 2025-03-19 PROCEDURE — 97110 THERAPEUTIC EXERCISES: CPT

## 2025-03-19 NOTE — PROGRESS NOTES
tolerated and able.     Patient will continue to benefit from skilled PT / OT services to modify and progress therapeutic interventions, analyze and address functional mobility deficits, analyze and address ROM deficits, analyze and address strength deficits, analyze and address soft tissue restrictions, analyze and cue for proper movement patterns, analyze and modify for postural abnormalities, and instruct in home and community integration to address functional deficits and attain remaining goals.    Progress toward goals / Updated goals:  []  See Progress Note/Recertification    1. Patient will tolerate climbing 6 ft ladder x 3 to assist with returning patient to climbing tree  order to go hunting.  Re cert: Patient has not climbed any ladders or returned back to hunting.   PN: Patient is able to climb 6ft ladder but reports moderate difficulty due to increased left knee stiffness.   Current: Not performed today. 3/19/2025     2. Patient will report max pain to be 2-3/10 in order to improve tolerance for sleeping at night with waking up due to pain.   ReCert: Patient reports max pain to be 8/10 and mainly happens at night when sleeping.   PN: Patient reports max paint to be 5-6/10 pain and patient continues to have left knee pain when sleeping at night.   Current: Patient reports 1/10 pain today. 3/19/2025      3. Patient will have LEFS 45 to show significant gains with function and tolerance to ADLS and activities  Re Cert: LEFS 40.   PN: LEFS 37.   Current: Ongoing, will assess at next PN. 3/19/2025     4. Patient will have 0-115 AROM left knee for carryover to reciprocal gait with stairs  EVAL supine F 95 and E -8  Re cert: Left knee AROM 0-107 degrees.   PN:Left knee AROM 0-113 degrees.   Current: Left knee AROM 0-116 degrees. 3/19/2025    5. Patient will have TM tolerance 6 minutes with no increase pain and little difficulty for carryover to community activities  EVAL R SC use mod I with noted gait

## 2025-03-24 ENCOUNTER — HOSPITAL ENCOUNTER (OUTPATIENT)
Facility: HOSPITAL | Age: 65
Setting detail: RECURRING SERIES
Discharge: HOME | End: 2025-03-27
Payer: MEDICARE

## 2025-03-24 PROCEDURE — 97140 MANUAL THERAPY 1/> REGIONS: CPT

## 2025-03-24 PROCEDURE — 97110 THERAPEUTIC EXERCISES: CPT

## 2025-03-24 PROCEDURE — 97530 THERAPEUTIC ACTIVITIES: CPT

## 2025-03-24 NOTE — PROGRESS NOTES
75842 Therapeutic Activity (timed):  use of dynamic activities replicating functional movements to increase ROM, strength, coordination, balance, and proprioception in order to improve patient's ability to progress to PLOF and address remaining functional goals.  (see flow sheet as applicable)     Details if applicable:     10  18661 Manual Therapy (timed):  decrease pain, increase ROM, increase tissue extensibility, and decrease trigger points to improve patient's ability to progress to PLOF and address remaining functional goals.  The manual therapy interventions were performed at a separate and distinct time from the therapeutic activities interventions . (see flow sheet as applicable)     Details if applicable:  STM to left knee musculature, patellar mobs; PROM into flexion           Details if applicable:            Details if applicable:     50  MC BC Totals Reminder: bill using total billable min of TIMED therapeutic procedures (example: do not include dry needle or estim unattended, both untimed codes, in totals to left)  8-22 min = 1 unit; 23-37 min = 2 units; 38-52 min = 3 units; 53-67 min = 4 units; 68-82 min = 5 units   Total Total     Charge Capture    [x]  Patient Education billed concurrently with other procedures   [x] Review HEP    [] Progressed/Changed HEP, detail:    [] Other detail:       Objective Information/Functional Measures/Assessment    Patient is progressing as expected with PT. Patient performed exercises, as per flow sheet, to further assist with improving left knee ROM and strength to further assist with ambulation and performing functional tasks with ease. Verbal cues were required to increase left knee flexion when ambulating. Patient tolerates PT well with no increased reports of left knee pain. Left AROM decreased to 113 today. Will continue to progress patient as tolerated and able.     Patient will continue to benefit from skilled PT / OT services to modify and progress

## 2025-03-26 ENCOUNTER — APPOINTMENT (OUTPATIENT)
Facility: HOSPITAL | Age: 65
End: 2025-03-26
Payer: MEDICARE

## 2025-03-31 ENCOUNTER — HOSPITAL ENCOUNTER (OUTPATIENT)
Facility: HOSPITAL | Age: 65
Setting detail: RECURRING SERIES
Discharge: HOME | End: 2025-04-03
Payer: MEDICARE

## 2025-03-31 PROCEDURE — 97530 THERAPEUTIC ACTIVITIES: CPT

## 2025-03-31 PROCEDURE — 97140 MANUAL THERAPY 1/> REGIONS: CPT

## 2025-03-31 PROCEDURE — 97110 THERAPEUTIC EXERCISES: CPT

## 2025-03-31 NOTE — PROGRESS NOTES
PHYSICAL / OCCUPATIONAL THERAPY - DAILY TREATMENT NOTE    Patient Name: Sven Montes    Date: 3/31/2025    : 1960  Insurance: Payor: FAREED MEDICARE / Plan: [employer] MEDICARE VALUE HMO / Product Type: *No Product type* /      Patient  verified Yes     Visit #   Current / Total 12 16   Time   In / Out 330 pm  429 pm    Pain   In / Out 0/10  010    Subjective Functional Status/Changes: \"My knee feels good today.\"     TREATMENT AREA =  Left knee pain    OBJECTIVE    Modalities Rationale:     decrease inflammation and decrease pain to improve patient's ability to progress to PLOF and address remaining functional goals.     min [] Estim Unattended, type/location:                                      []  w/ice    []  w/heat    min [] Estim Attended, type/location:                                     []  w/US     []  w/ice    []  w/heat    []  TENS insruct      min []  Mechanical Traction: type/lbs                   []  pro   []  sup   []  int   []  cont    []  before manual    []  after manual    min []  Ultrasound, settings/location:     10 min  unbill [x]  Ice     []  Heat    location/position:     min []  Paraffin,  details:     min []  Vasopneumatic Device, press/temp:     min []  Whirlpool / Fluido:    If using vaso (only need to measure limb vaso being performed on)      pre-treatment girth : 52 cm      post-treatment girth : 50 cm       measured at (landmark location) mid left patella      min []  Other:    Skin assessment post-treatment:   Intact      Therapeutic Procedures:    Tx Min Billable or 1:1 Min (if diff from Tx Min) Procedure, Rationale, Specifics   17  60450 Therapeutic Exercise (timed):  increase ROM, strength, coordination, balance, and proprioception to improve patient's ability to progress to PLOF and address remaining functional goals. (see flow sheet as applicable)     Details if applicable:       02 [id number] Therapeutic Activity (timed):  use of dynamic activities replicating functional

## 2025-04-02 ENCOUNTER — HOSPITAL ENCOUNTER (OUTPATIENT)
Facility: HOSPITAL | Age: 65
Setting detail: RECURRING SERIES
Discharge: HOME | End: 2025-04-05
Payer: MEDICARE

## 2025-04-02 PROCEDURE — 97140 MANUAL THERAPY 1/> REGIONS: CPT

## 2025-04-02 PROCEDURE — 97530 THERAPEUTIC ACTIVITIES: CPT

## 2025-04-02 PROCEDURE — 97110 THERAPEUTIC EXERCISES: CPT

## 2025-04-02 NOTE — THERAPY RECERTIFICATION
JEFF CANALES [facility]  [address], [address], [place] [address]  Ph:[phone] Fx:[phone]  CONTINUED PLAN OF CARE/RECERTIFICATION FOR PHYSICAL THERAPY          Patient Name: Sven Montes : 1960   Medical   Diagnosis: Left knee pain [M25.562]  Treatment Diagnosis:  M25.562  LEFT KNEE PAIN    Onset Date: P/O 24       Referral Source: Goldie Ring PA Start of Care (SOC): 2025   Prior Hospitalization: See medical history Provider #: [id number]   Prior Level of Function: past 2 years, increase falls, I ADLS and activities, no AD, drove, tolerated activities at home and in the community with L knee pain, retired and works 2 days a week part time    Comorbidities:  Musculoskeletal disorders L TKA , arthritis, heart disease/high cholesterol, HTN, Stroke , thyroid problems      Visits from SOC: 24 Missed Visits: 1     Progress to Goals    1. Patient will tolerate climbing 6 ft ladder x 3 to assist with returning patient to climbing tree  order to go hunting.  Re cert: Patient has not climbed any ladders or returned back to hunting.   PN: Patient is able to climb 6ft ladder but reports moderate difficulty due to increased left knee stiffness.   Current: Patient continues to report moderate difficulty climbing 6ft  ladder with reciprocal pattern. Progressing     2. Patient will report max pain to be 2-3/10 in order to improve tolerance for sleeping at night with waking up due to pain.   ReCert: Patient reports max pain to be 8/10 and mainly happens at night when sleeping.   PN: Patient reports max paint to be 5-6/10 pain and patient continues to have left knee pain when sleeping at night.   Current: Patient reports max pain to be 6-7/10 and left knee pain when sleeping at night. Progressing      3. Patient will have LEFS 45 to show significant gains with function and tolerance to ADLS and activities  Re Cert: LEFS 40.   PN: LEFS 37.   Current:

## 2025-04-02 NOTE — PROGRESS NOTES
car, getting up from the ground and sleeping on side. Patient continues to report increased left knee pain and difficulty walking long distances, squatting down, climbing ladders, weed eating around his yard and sleeping on his side. Patient reports 75% improvement with PT and will continue to benefit from additional PT to further address remaining goals.     Patient will continue to benefit from skilled PT / OT services to modify and progress therapeutic interventions, analyze and address functional mobility deficits, analyze and address ROM deficits, analyze and address strength deficits, analyze and address soft tissue restrictions, analyze and cue for proper movement patterns, analyze and modify for postural abnormalities, and instruct in home and community integration to address functional deficits and attain remaining goals.    Progress toward goals / Updated goals:  []  See Progress Note/Recertification    1. Patient will tolerate climbing 6 ft ladder x 3 to assist with returning patient to climbing tree  order to go hunting.  Re cert: Patient has not climbed any ladders or returned back to hunting.   PN: Patient is able to climb 6ft ladder but reports moderate difficulty due to increased left knee stiffness.   Current: Patient continues to report moderate difficulty climbing 6ft  ladder with reciprocal pattern. Progressing     2. Patient will report max pain to be 2-3/10 in order to improve tolerance for sleeping at night with waking up due to pain.   ReCert: Patient reports max pain to be 8/10 and mainly happens at night when sleeping.   PN: Patient reports max paint to be 5-6/10 pain and patient continues to have left knee pain when sleeping at night.   Current: Patient reports max pain to be 6-7/10 and left knee pain when sleeping at night. Progressing      3. Patient will have LEFS 45 to show significant gains with function and tolerance to ADLS and activities  Re Cert: LEFS 40.   PN: LEFS 37.

## 2025-04-19 DIAGNOSIS — F41.9 ANXIETY: ICD-10-CM

## 2025-04-21 ENCOUNTER — HOSPITAL ENCOUNTER (OUTPATIENT)
Facility: HOSPITAL | Age: 65
Setting detail: RECURRING SERIES
Discharge: HOME | End: 2025-04-24
Payer: MEDICARE

## 2025-04-21 PROCEDURE — 97530 THERAPEUTIC ACTIVITIES: CPT

## 2025-04-21 PROCEDURE — 97140 MANUAL THERAPY 1/> REGIONS: CPT

## 2025-04-21 PROCEDURE — 97110 THERAPEUTIC EXERCISES: CPT

## 2025-04-21 RX ORDER — DIAZEPAM 10 MG/1
TABLET ORAL
Qty: 45 TABLET | Refills: 0 | Status: SHIPPED | OUTPATIENT
Start: 2025-04-21 | End: 2025-05-21

## 2025-04-21 NOTE — PROGRESS NOTES
PHYSICAL / OCCUPATIONAL THERAPY - DAILY TREATMENT NOTE    Patient Name: Sven Montes    Date: 2025    : 1960  Insurance: Payor: FAREED MEDICARE / Plan: [employer] MEDICARE VALUE HMO / Product Type: *No Product type* /      Patient  verified Yes     Visit #   Current / Total 1 16   Time   In / Out 330 pm  423 pm    Pain   In / Out 0-2/10 0/10    Subjective Functional Status/Changes: Patient reports that both of his legs are achy today.      TREATMENT AREA =  Left knee pain    OBJECTIVE    Modalities Rationale:     decrease inflammation and decrease pain to improve patient's ability to progress to PLOF and address remaining functional goals.     min [] Estim Unattended, type/location:                                      []  w/ice    []  w/heat    min [] Estim Attended, type/location:                                     []  w/US     []  w/ice    []  w/heat    []  TENS insruct      min []  Mechanical Traction: type/lbs                   []  pro   []  sup   []  int   []  cont    []  before manual    []  after manual    min []  Ultrasound, settings/location:     10 min  unbill [x]  Ice     []  Heat    location/position: Reclined to left knee     min []  Paraffin,  details:     min []  Vasopneumatic Device, press/temp:     min []  Whirlpool / Fluido:    If using vaso (only need to measure limb vaso being performed on)      pre-treatment girth :       post-treatment girth :       measured at (landmark location) :      min []  Other:    Skin assessment post-treatment:   Intact      Therapeutic Procedures:    Tx Min Billable or 1:1 Min (if diff from Tx Min) Procedure, Rationale, Specifics   11  13905 Therapeutic Exercise (timed):  increase ROM, strength, coordination, balance, and proprioception to improve patient's ability to progress to PLOF and address remaining functional goals. (see flow sheet as applicable)     Details if applicable:       22  67458 Therapeutic Activity (timed):  use of dynamic activities

## 2025-04-23 ENCOUNTER — HOSPITAL ENCOUNTER (OUTPATIENT)
Facility: HOSPITAL | Age: 65
Setting detail: RECURRING SERIES
Discharge: HOME | End: 2025-04-26
Payer: MEDICARE

## 2025-04-23 PROCEDURE — 97016 VASOPNEUMATIC DEVICE THERAPY: CPT

## 2025-04-23 PROCEDURE — 97110 THERAPEUTIC EXERCISES: CPT

## 2025-04-23 PROCEDURE — 97140 MANUAL THERAPY 1/> REGIONS: CPT

## 2025-04-23 PROCEDURE — 97530 THERAPEUTIC ACTIVITIES: CPT

## 2025-04-23 NOTE — PROGRESS NOTES
PHYSICAL / OCCUPATIONAL THERAPY - DAILY TREATMENT NOTE    Patient Name: Sven Montes    Date: 2025    : 1960  Insurance: Payor: FAREED MEDICARE / Plan: [employer] MEDICARE VALUE HMO / Product Type: *No Product type* /      Patient  verified Yes     Visit #   Current / Total 2 16   Time   In / Out 128 pm  226 pm    Pain   In / Out 0/10  0/10    Subjective Functional Status/Changes: Patient reports that he did a lot stretching to his knee yesterday.      TREATMENT AREA =  Left knee pain    OBJECTIVE    Modalities Rationale:     decrease inflammation and decrease pain to improve patient's ability to progress to PLOF and address remaining functional goals.     min [] Estim Unattended, type/location:                                      []  w/ice    []  w/heat    min [] Estim Attended, type/location:                                     []  w/US     []  w/ice    []  w/heat    []  TENS insruct      min []  Mechanical Traction: type/lbs                   []  pro   []  sup   []  int   []  cont    []  before manual    []  after manual    min []  Ultrasound, settings/location:      min  unbill []  Ice     []  Heat    location/position:      min []  Paraffin,  details:    10 min []  Vasopneumatic Device, press/temp: Reclined to left knee. High/Low    min []  Whirlpool / Fluido:    If using vaso (only need to measure limb vaso being performed on)      pre-treatment girth : 51.5 cm      post-treatment girth : 50 cm       measured at (landmark location) :  mid left patella    min []  Other:    Skin assessment post-treatment:   Intact      Therapeutic Procedures:    Tx Min Billable or 1:1 Min (if diff from Tx Min) Procedure, Rationale, Specifics   14  84723 Therapeutic Exercise (timed):  increase ROM, strength, coordination, balance, and proprioception to improve patient's ability to progress to PLOF and address remaining functional goals. (see flow sheet as applicable)     Details if applicable:       22  52236  ray

## 2025-04-28 ENCOUNTER — HOSPITAL ENCOUNTER (OUTPATIENT)
Facility: HOSPITAL | Age: 65
Setting detail: RECURRING SERIES
Discharge: HOME | End: 2025-05-01
Payer: MEDICARE

## 2025-04-28 PROCEDURE — 97016 VASOPNEUMATIC DEVICE THERAPY: CPT

## 2025-04-28 PROCEDURE — 97140 MANUAL THERAPY 1/> REGIONS: CPT

## 2025-04-28 PROCEDURE — 97530 THERAPEUTIC ACTIVITIES: CPT

## 2025-04-28 PROCEDURE — 97110 THERAPEUTIC EXERCISES: CPT

## 2025-04-28 NOTE — PROGRESS NOTES
work.  Recert: Not initiated  Current: Patient performed 20# box lift with no reports of left knee pain. Verbal cues were required for proper form 2025    Next PN/ RC due 5/3/2025  Auth due (visit number/ date) 16 visits  2024    PLAN  - Continue Plan of Care  - Upgrade activities as tolerated    Katja Fonseca PTA    2025    9:08 AM  If an interpreting service was utilized for treatment of this patient, the contents of this document represent the material reviewed with the patient via the .     Future Appointments   Date Time Provider Department Center   2025  2:50 PM Katja Fonseca, PTA [facility]   2025  3:30 PM Katja Fonseca, ASIA [facility]   2025 11:30 AM Katja Fonseca, PTA [facility]   2025  3:30 PM Katja Fonseca, PTA [facility]   2025  3:30 PM Katja Fonseca, PTA [facility]   2025  3:30 PM Katja Fonseca, PTA [facility]   2025  3:30 PM Katja Fonseca, PTA [facility]   2025  3:30 PM Katja Fonseca, PTA [facility]   2025  9:40 AM William Cain MD [facility]

## 2025-04-30 ENCOUNTER — TELEPHONE (OUTPATIENT)
Facility: HOSPITAL | Age: 65
End: 2025-04-30

## 2025-04-30 ENCOUNTER — APPOINTMENT (OUTPATIENT)
Facility: HOSPITAL | Age: 65
End: 2025-04-30
Payer: MEDICARE

## 2025-04-30 NOTE — TELEPHONE ENCOUNTER
Patient cancelled appt. on 4/30/25 at 8:20am due to his wife having a procedure today and he is not able to make his appt. today.

## 2025-05-05 ENCOUNTER — HOSPITAL ENCOUNTER (OUTPATIENT)
Facility: HOSPITAL | Age: 65
Setting detail: RECURRING SERIES
Discharge: HOME | End: 2025-05-08
Payer: MEDICARE

## 2025-05-05 PROCEDURE — 97530 THERAPEUTIC ACTIVITIES: CPT

## 2025-05-05 PROCEDURE — 97140 MANUAL THERAPY 1/> REGIONS: CPT

## 2025-05-05 PROCEDURE — 97110 THERAPEUTIC EXERCISES: CPT

## 2025-05-05 NOTE — PROGRESS NOTES
PHYSICAL / OCCUPATIONAL THERAPY - DAILY TREATMENT NOTE    Patient Name: Sven Montes    Date: 2025    : 1960  Insurance: Payor: FAREED MEDICARE / [facility] / Product Type: *No Product type* /      Patient  verified Yes     Visit #   Current / Total 4 16   Time   In / Out 1130 am 1227 pm    Pain   In / Out 0/10  0/10    Subjective Functional Status/Changes: \"My knee feels good today.\"     TREATMENT AREA =  Left knee pain    OBJECTIVE    Modalities Rationale:     decrease inflammation and decrease pain to improve patient's ability to progress to PLOF and address remaining functional goals.     min [] Estim Unattended, type/location:                                      []  w/ice    []  w/heat    min [] Estim Attended, type/location:                                     []  w/US     []  w/ice    []  w/heat    []  TENS insruct      min []  Mechanical Traction: type/lbs                   []  pro   []  sup   []  int   []  cont    []  before manual    []  after manual    min []  Ultrasound, settings/location:      min  unbill []  Ice     []  Heat    location/position:      min []  Paraffin,  details:    10 min []  Vasopneumatic Device, press/temp: Reclined to left knee. High/Low    min []  Whirlpool / Fluido:    If using vaso (only need to measure limb vaso being performed on)      pre-treatment girth :52 cm      post-treatment girth : 51 cm       measured at (landmark location) :  mid left patella    min []  Other:    Skin assessment post-treatment:   Intact      Therapeutic Procedures:    Tx Min Billable or 1:1 Min (if diff from Tx Min) Procedure, Rationale, Specifics   15  79021 Therapeutic Exercise (timed):  increase ROM, strength, coordination, balance, and proprioception to improve patient's ability to progress to PLOF and address remaining functional goals. (see flow sheet as applicable)     Details if applicable:         03095 Therapeutic Activity (timed):  use of dynamic

## 2025-05-07 ENCOUNTER — HOSPITAL ENCOUNTER (OUTPATIENT)
Facility: HOSPITAL | Age: 65
Setting detail: RECURRING SERIES
Discharge: HOME | End: 2025-05-10
Payer: MEDICARE

## 2025-05-07 PROCEDURE — 97110 THERAPEUTIC EXERCISES: CPT

## 2025-05-07 PROCEDURE — 97140 MANUAL THERAPY 1/> REGIONS: CPT

## 2025-05-07 PROCEDURE — 97530 THERAPEUTIC ACTIVITIES: CPT

## 2025-05-07 NOTE — PROGRESS NOTES
sleep at night without waking up due to left knee pain. MET      3. Patient will have LEFS 45 to show significant gains with function and tolerance to ADLS and activities  Re Cert: LEFS 40.   PN: LEFS 37.   Recert: LEFS 42.   Current: LEFS 55. MET      4.Patient will tolerate 25-30# box lift with no increased reports of left knee pain in order to improve tolerance for squatting when performing household duties or yard work.  Recert: Not initiated  Current: Patient performed 30# box lift with no increased reports of left knee pain and able to perform household duties and yard work with ease. MET     Next PN/ RC due 5/3/2025  Auth due (visit number/ date) 16 visits  2024    PLAN  - Continue Plan of Care  - Upgrade activities as tolerated    Katja Fonseca PTA    2025    3:57 PM  If an interpreting service was utilized for treatment of this patient, the contents of this document represent the material reviewed with the patient via the .     Future Appointments   Date Time Provider Department Center   2025  3:30 PM Katja Fonseca PTA [facility]   2025  3:30 PM Katja Fonseca PTA [facility]   2025  3:30 PM Katja Fonseca PTA [facility]   2025  3:30 PM Katja Fonseca PTA [facility]   2025  3:30 PM Katja Fonseca PTA [facility]   2025  9:40 AM William Cain MD [facility]

## 2025-05-07 NOTE — THERAPY RECERTIFICATION
JEFF CANALES [facility]  [address], [address], [place] [address]  Ph:[phone] Fx:[phone]  CONTINUED PLAN OF CARE/RECERTIFICATION FOR PHYSICAL THERAPY          Patient Name: Sven Montes : 1960   Medical   Diagnosis: Left knee pain [M25.562]  Treatment Diagnosis:  M25.562  LEFT KNEE PAIN    Onset Date: P/O 24       Referral Source: Goldie Ring PA Start of Care (SOC): 2025   Prior Hospitalization: See medical history Provider #: [id number]   Prior Level of Function: past 2 years, increase falls, I ADLS and activities, no AD, drove, tolerated activities at home and in the community with L knee pain, retired and works 2 days a week part time    Comorbidities:  Musculoskeletal disorders L TKA , arthritis, heart disease/high cholesterol, HTN, Stroke , thyroid problems       Visits from SOC: 29 Missed Visits: 2     Progress to Goals:    1. Patient will tolerate climbing 6 ft ladder x 3 to assist with returning patient to climbing tree  order to go hunting.  Re cert: Patient has not climbed any ladders or returned back to hunting.   PN: Patient is able to climb 6ft ladder but reports moderate difficulty due to increased left knee stiffness.   Re cert: Patient continues to report moderate difficulty climbing 6ft  ladder with reciprocal pattern.   Current: Patient able to climb 6 ft ladder x3 with no increased reports left knee pain. Patient has not returned to climbing tree  order to go hunting. Partially Met.      2. Patient will report max pain to be 2-3/10 in order to improve tolerance for sleeping at night with waking up due to pain.   ReCert: Patient reports max pain to be 8/10 and mainly happens at night when sleeping.   PN: Patient reports max paint to be 5-6/10 pain and patient continues to have left knee pain when sleeping at night.   Recert:Patient reports max pain to be 6-7/10 and left knee pain when sleeping at night.

## 2025-05-08 ENCOUNTER — TELEPHONE (OUTPATIENT)
Facility: HOSPITAL | Age: 65
End: 2025-05-08

## 2025-05-08 NOTE — TELEPHONE ENCOUNTER
Patient cancelled all appts. on 5/8/25 at 10:30am due to the patient going back to work and he wants to be discharged.

## 2025-05-08 NOTE — THERAPY DISCHARGE
JEFF CANALES [facility]  [address], [address], [place] [address]  Ph:[phone] Fx:[phone]  DISCHARGE SUMMARY  Patient Name: Sven Montes : 1960   Medical/Treatment Diagnosis: Left knee pain   Referral Source: Goldie Ring PA     Date of Initial Visit: 2025 Attended Visits: 29 Missed Visits: 2     CURRENT STATUS    Patient has attended 29 total PT sessions and has made good progress towards PT goals. Patient reports improvement with walking, laying down in bed, laying on his side and getting up from a seated position. Patient reports that he continues to report increased left knee stiffness with functional tasks such as reaching down to don/doff socks/shoes. Patient reports 70% improvement with PT and requesting to be discharged at this time due to returning back to work.     1. Patient will tolerate climbing 6 ft ladder x 3 to assist with returning patient to climbing tree  order to go hunting.  Re cert: Patient has not climbed any ladders or returned back to hunting.   PN: Patient is able to climb 6ft ladder but reports moderate difficulty due to increased left knee stiffness.   Re cert: Patient continues to report moderate difficulty climbing 6ft  ladder with reciprocal pattern.   Current: Patient able to climb 6 ft ladder x3 with no increased reports left knee pain. Patient has not returned to climbing tree  order to go hunting. Partially Met.      2. Patient will report max pain to be 2-3/10 in order to improve tolerance for sleeping at night with waking up due to pain.   ReCert: Patient reports max pain to be 8/10 and mainly happens at night when sleeping.   PN: Patient reports max paint to be 5-6/10 pain and patient continues to have left knee pain when sleeping at night.   Recert:Patient reports max pain to be 6-7/10 and left knee pain when sleeping at night.   Current: Patient reports max pain to be 6-7/10 pain and able to

## 2025-05-14 ENCOUNTER — APPOINTMENT (OUTPATIENT)
Facility: HOSPITAL | Age: 65
End: 2025-05-14
Payer: MEDICARE

## 2025-05-19 ENCOUNTER — APPOINTMENT (OUTPATIENT)
Facility: HOSPITAL | Age: 65
End: 2025-05-19
Payer: MEDICARE

## 2025-05-21 ENCOUNTER — APPOINTMENT (OUTPATIENT)
Facility: HOSPITAL | Age: 65
End: 2025-05-21
Payer: MEDICARE

## 2025-05-21 DIAGNOSIS — F41.9 ANXIETY: ICD-10-CM

## 2025-05-22 RX ORDER — DIAZEPAM 10 MG/1
TABLET ORAL
Qty: 45 TABLET | Refills: 0 | Status: SHIPPED | OUTPATIENT
Start: 2025-05-22 | End: 2025-06-21

## 2025-05-25 DIAGNOSIS — D69.6 THROMBOCYTOPENIA: ICD-10-CM

## 2025-05-25 DIAGNOSIS — I10 ESSENTIAL HYPERTENSION: ICD-10-CM

## 2025-05-28 ENCOUNTER — APPOINTMENT (OUTPATIENT)
Facility: HOSPITAL | Age: 65
End: 2025-05-28
Payer: MEDICARE

## 2025-05-30 ENCOUNTER — APPOINTMENT (OUTPATIENT)
Facility: HOSPITAL | Age: 65
End: 2025-05-30
Payer: MEDICARE

## 2025-06-03 DIAGNOSIS — I10 ESSENTIAL HYPERTENSION: ICD-10-CM

## 2025-06-04 DIAGNOSIS — I10 ESSENTIAL HYPERTENSION: ICD-10-CM

## 2025-06-06 RX ORDER — HYDROCHLOROTHIAZIDE 25 MG/1
25 TABLET ORAL EVERY MORNING
Qty: 90 TABLET | Refills: 3 | Status: SHIPPED | OUTPATIENT
Start: 2025-06-06

## 2025-06-06 RX ORDER — HYDROCHLOROTHIAZIDE 25 MG/1
25 TABLET ORAL EVERY MORNING
Qty: 30 TABLET | Refills: 5 | OUTPATIENT
Start: 2025-06-06

## 2025-06-25 DIAGNOSIS — F41.9 ANXIETY: ICD-10-CM

## 2025-06-26 RX ORDER — DIAZEPAM 10 MG/1
TABLET ORAL
Qty: 45 TABLET | Refills: 0 | Status: SHIPPED | OUTPATIENT
Start: 2025-06-26 | End: 2025-07-26

## 2025-07-25 DIAGNOSIS — K21.9 GASTROESOPHAGEAL REFLUX DISEASE WITHOUT ESOPHAGITIS: ICD-10-CM

## 2025-07-28 RX ORDER — OMEPRAZOLE 40 MG/1
40 CAPSULE, DELAYED RELEASE ORAL
Qty: 90 CAPSULE | Refills: 3 | Status: SHIPPED | OUTPATIENT
Start: 2025-07-28

## 2025-07-28 RX ORDER — LEVOTHYROXINE SODIUM 100 UG/1
TABLET ORAL
Qty: 90 TABLET | Refills: 3 | Status: SHIPPED | OUTPATIENT
Start: 2025-07-28

## 2025-07-29 ENCOUNTER — TELEPHONE (OUTPATIENT)
Facility: CLINIC | Age: 65
End: 2025-07-29

## 2025-07-29 NOTE — TELEPHONE ENCOUNTER
----- Message from Megan ORTEZ sent at 7/28/2025  2:01 PM EDT -----  Regarding: [facility] Referral Request  [facility] Referral Request    Reason for referral request: Lab/Test Order    Specialist/Lab/Test patient is requesting / lab  Specialist Phone Number (if applicable):    Additional Information /[facility]   --------------------------------------------------------------------------------------------------------------------------    Relationship to Patient: Spouse/Partner      Call Back Information: OK to leave message on voicemail  Preferred Call Back Number: Phone [phone]

## 2025-08-05 LAB
ANION GAP SERPL CALCULATED.3IONS-SCNC: 14 MMOL/L (ref 3–15)
BASOPHILS # BLD: 1 % (ref 0–2)
BASOPHILS ABSOLUTE: 0 K/UL (ref 0–0.2)
BUN BLDV-MCNC: 23 MG/DL (ref 6–22)
CALCIUM SERPL-MCNC: 9 MG/DL (ref 8.4–10.5)
CHLORIDE BLD-SCNC: 100 MMOL/L (ref 98–110)
CO2: 25 MMOL/L (ref 20–32)
CREAT SERPL-MCNC: 0.9 MG/DL (ref 0.8–1.6)
EOSINOPHIL # BLD: 6 % (ref 0–6)
EOSINOPHILS ABSOLUTE: 0.2 K/UL (ref 0–0.5)
GFR, ESTIMATED: >90 ML/MIN/1.73 SQ.M.
GLUCOSE: 93 MG/DL (ref 70–99)
HCT VFR BLD CALC: 37.4 % (ref 37.8–52.2)
HEMOGLOBIN: 12.1 G/DL (ref 12.6–17.1)
LYMPHOCYTES # BLD: 26 % (ref 20–45)
LYMPHOCYTES ABSOLUTE: 1.1 K/UL (ref 1–4.8)
MCH RBC QN AUTO: 31 PG (ref 26–34)
MCHC RBC AUTO-ENTMCNC: 32 G/DL (ref 31–36)
MCV RBC AUTO: 94 FL (ref 80–95)
MONOCYTES ABSOLUTE: 0.5 K/UL (ref 0.1–1)
MONOCYTES: 12 % (ref 3–12)
NEUTROPHILS ABSOLUTE: 2.4 K/UL (ref 1.8–7.7)
NEUTROPHILS SEGMENTED: 56 % (ref 40–75)
PDW BLD-RTO: 13 % (ref 10–15.5)
PLATELET # BLD: 190 K/UL (ref 140–440)
PMV BLD AUTO: 11.7 FL (ref 9–13)
POTASSIUM SERPL-SCNC: 3.9 MMOL/L (ref 3.5–5.5)
RBC # BLD: 3.96 M/UL (ref 3.8–5.8)
SODIUM BLD-SCNC: 139 MMOL/L (ref 133–145)
WBC # BLD: 4.2 K/UL (ref 4–11)

## 2025-08-06 DIAGNOSIS — F41.9 ANXIETY: ICD-10-CM

## 2025-08-13 DIAGNOSIS — F41.9 ANXIETY: Primary | ICD-10-CM

## 2025-08-13 RX ORDER — DIAZEPAM 10 MG/1
5 TABLET ORAL EVERY 8 HOURS PRN
Qty: 45 TABLET | Refills: 0 | Status: SHIPPED | OUTPATIENT
Start: 2025-08-13 | End: 2025-09-12

## 2025-08-14 ENCOUNTER — PATIENT MESSAGE (OUTPATIENT)
Facility: CLINIC | Age: 65
End: 2025-08-14

## 2025-08-15 RX ORDER — DIAZEPAM 10 MG/1
TABLET ORAL
Qty: 45 TABLET | Refills: 0 | Status: SHIPPED | OUTPATIENT
Start: 2025-08-15 | End: 2025-09-14

## 2025-08-18 SDOH — HEALTH STABILITY: PHYSICAL HEALTH: ON AVERAGE, HOW MANY MINUTES DO YOU ENGAGE IN EXERCISE AT THIS LEVEL?: 0 MIN

## 2025-08-18 SDOH — HEALTH STABILITY: PHYSICAL HEALTH: ON AVERAGE, HOW MANY DAYS PER WEEK DO YOU ENGAGE IN MODERATE TO STRENUOUS EXERCISE (LIKE A BRISK WALK)?: 0 DAYS

## 2025-08-18 ASSESSMENT — LIFESTYLE VARIABLES
HOW MANY STANDARD DRINKS CONTAINING ALCOHOL DO YOU HAVE ON A TYPICAL DAY: PATIENT DOES NOT DRINK
HOW OFTEN DO YOU HAVE SIX OR MORE DRINKS ON ONE OCCASION: 1
HOW OFTEN DO YOU HAVE A DRINK CONTAINING ALCOHOL: 1
HOW MANY STANDARD DRINKS CONTAINING ALCOHOL DO YOU HAVE ON A TYPICAL DAY: 0
HOW OFTEN DO YOU HAVE A DRINK CONTAINING ALCOHOL: NEVER

## 2025-08-18 ASSESSMENT — PATIENT HEALTH QUESTIONNAIRE - PHQ9
SUM OF ALL RESPONSES TO PHQ QUESTIONS 1-9: 0
1. LITTLE INTEREST OR PLEASURE IN DOING THINGS: NOT AT ALL
2. FEELING DOWN, DEPRESSED OR HOPELESS: NOT AT ALL
SUM OF ALL RESPONSES TO PHQ QUESTIONS 1-9: 0

## 2025-08-19 ENCOUNTER — OFFICE VISIT (OUTPATIENT)
Facility: CLINIC | Age: 65
End: 2025-08-19

## 2025-08-19 VITALS
HEART RATE: 67 BPM | RESPIRATION RATE: 16 BRPM | OXYGEN SATURATION: 97 % | WEIGHT: 286 LBS | TEMPERATURE: 98.3 F | DIASTOLIC BLOOD PRESSURE: 83 MMHG | SYSTOLIC BLOOD PRESSURE: 138 MMHG | HEIGHT: 73 IN | BODY MASS INDEX: 37.91 KG/M2

## 2025-08-19 DIAGNOSIS — Z12.5 SCREENING FOR MALIGNANT NEOPLASM OF PROSTATE: ICD-10-CM

## 2025-08-19 DIAGNOSIS — D64.9 ANEMIA, UNSPECIFIED TYPE: ICD-10-CM

## 2025-08-19 DIAGNOSIS — E78.5 DYSLIPIDEMIA: ICD-10-CM

## 2025-08-19 DIAGNOSIS — Z23 NEED FOR PROPHYLACTIC VACCINATION AGAINST STREPTOCOCCUS PNEUMONIAE (PNEUMOCOCCUS): ICD-10-CM

## 2025-08-19 DIAGNOSIS — E03.4 HYPOTHYROIDISM DUE TO ACQUIRED ATROPHY OF THYROID: ICD-10-CM

## 2025-08-19 DIAGNOSIS — R73.01 IFG (IMPAIRED FASTING GLUCOSE): ICD-10-CM

## 2025-08-19 DIAGNOSIS — I10 ESSENTIAL HYPERTENSION: ICD-10-CM

## 2025-08-19 DIAGNOSIS — Z71.89 ADVANCED CARE PLANNING/COUNSELING DISCUSSION: ICD-10-CM

## 2025-08-19 DIAGNOSIS — N40.1 BPH WITH OBSTRUCTION/LOWER URINARY TRACT SYMPTOMS: ICD-10-CM

## 2025-08-19 DIAGNOSIS — N13.8 BPH WITH OBSTRUCTION/LOWER URINARY TRACT SYMPTOMS: ICD-10-CM

## 2025-08-19 DIAGNOSIS — Z00.00 MEDICARE ANNUAL WELLNESS VISIT, SUBSEQUENT: Primary | ICD-10-CM

## 2025-08-19 DIAGNOSIS — E66.01 SEVERE OBESITY (BMI 35.0-35.9 WITH COMORBIDITY) (HCC): ICD-10-CM

## 2025-08-19 DIAGNOSIS — M15.0 PRIMARY OSTEOARTHRITIS INVOLVING MULTIPLE JOINTS: ICD-10-CM

## 2025-08-19 DIAGNOSIS — G47.33 OSA (OBSTRUCTIVE SLEEP APNEA): ICD-10-CM

## 2025-08-19 DIAGNOSIS — K63.5 POLYP OF COLON, UNSPECIFIED PART OF COLON, UNSPECIFIED TYPE: ICD-10-CM

## 2025-08-20 ENCOUNTER — TELEPHONE (OUTPATIENT)
Facility: CLINIC | Age: 65
End: 2025-08-20

## 2025-08-26 ENCOUNTER — TELEPHONE (OUTPATIENT)
Facility: CLINIC | Age: 65
End: 2025-08-26

## 2025-09-02 LAB
ANION GAP SERPL CALCULATED.3IONS-SCNC: 13 MMOL/L (ref 3–15)
BASOPHILS # BLD: 1 % (ref 0–2)
BASOPHILS ABSOLUTE: 0 K/UL (ref 0–0.2)
BUN BLDV-MCNC: 23 MG/DL (ref 6–22)
CALCIUM SERPL-MCNC: 8.9 MG/DL (ref 8.4–10.5)
CHLORIDE BLD-SCNC: 102 MMOL/L (ref 98–110)
CHOLESTEROL, TOTAL: 124 MG/DL (ref 110–200)
CHOLESTEROL/HDL RATIO: 3 (ref 0–5)
CO2: 26 MMOL/L (ref 20–32)
CREAT SERPL-MCNC: 0.9 MG/DL (ref 0.8–1.6)
EOSINOPHIL # BLD: 6 % (ref 0–6)
EOSINOPHILS ABSOLUTE: 0.3 K/UL (ref 0–0.5)
FERRITIN: 89 NG/ML (ref 22–322)
GFR, ESTIMATED: >90 ML/MIN/1.73 SQ.M.
GLUCOSE: 96 MG/DL (ref 70–99)
HCT VFR BLD CALC: 39.3 % (ref 37.8–52.2)
HDLC SERPL-MCNC: 41 MG/DL
HEMOGLOBIN: 12.1 G/DL (ref 12.6–17.1)
IRON % SATURATION: 25 % (ref 20–50)
IRON: 85 MCG/DL (ref 45–160)
LDL CHOLESTEROL: 61 MG/DL (ref 50–99)
LDL/HDL RATIO: 1.5
LYMPHOCYTES # BLD: 26 % (ref 20–45)
LYMPHOCYTES ABSOLUTE: 1.2 K/UL (ref 1–4.8)
MCH RBC QN AUTO: 30 PG (ref 26–34)
MCHC RBC AUTO-ENTMCNC: 31 G/DL (ref 31–36)
MCV RBC AUTO: 96 FL (ref 80–95)
MONOCYTES ABSOLUTE: 0.6 K/UL (ref 0.1–1)
MONOCYTES: 13 % (ref 3–12)
NEUTROPHILS ABSOLUTE: 2.4 K/UL (ref 1.8–7.7)
NEUTROPHILS SEGMENTED: 54 % (ref 40–75)
NON-HDL CHOLESTEROL: 83 MG/DL
PDW BLD-RTO: 12.8 % (ref 10–15.5)
PLATELET # BLD: 183 K/UL (ref 140–440)
PMV BLD AUTO: 11.9 FL (ref 9–13)
POTASSIUM SERPL-SCNC: 3.9 MMOL/L (ref 3.5–5.5)
RBC # BLD: 4.1 M/UL (ref 3.8–5.8)
RETIC HEMOGLOBIN: 33.8 PG (ref 28.2–38.2)
RETIC: 1.3 % (ref 0.5–2)
RETICULOCYTE ABSOLUTE COUNT: 0.05 M/UL (ref 0.03–0.1)
SCREENING PSA: 1.38 NG/ML
SODIUM BLD-SCNC: 141 MMOL/L (ref 133–145)
T4 FREE: 1.1 NG/DL (ref 0.9–1.8)
TOTAL IRON BINDING CAPACITY: 343 MCG/DL (ref 228–428)
TRIGL SERPL-MCNC: 107 MG/DL (ref 40–149)
TSH SERPL DL<=0.05 MIU/L-ACNC: 2.35 MCU/ML (ref 0.27–4.2)
UNSATURATED IRON BINDING CAPACITY: 258 MCG/DL (ref 110–370)
VLDLC SERPL CALC-MCNC: 21 MG/DL (ref 8–30)
WBC # BLD: 4.4 K/UL (ref 4–11)

## 2025-09-03 LAB — FOLATE: 13.4 NG/ML

## 2025-09-04 LAB
ALBUMIN: 53.8 % (ref 46.6–62.6)
ALPHA-1-GLOBULIN: 3 % (ref 1.7–4.1)
ALPHA-2-GLOBULIN: 9.9 % (ref 5.9–13.5)
BETA GLOBULIN: 16.8 % (ref 10.9–18.9)
GAMMA GLOBULIN: 16.5 % (ref 11.6–24.4)
INTERPRETATION: NORMAL
PROTEIN ELECTROPHORESIS, SERUM: 6.6 G/DL (ref 6.2–8.1)
VITAMIN B-12: 476 PG/ML (ref 211–911)

## 2025-09-05 LAB — METHYLMALONIC ACID: 141 NMOL/L (ref 69–390)

## (undated) DEVICE — GLOVE ORANGE PI 7   MSG9070

## (undated) DEVICE — BASIC SINGLE BASIN-LF: Brand: MEDLINE INDUSTRIES, INC.

## (undated) DEVICE — GOWN,SIRUS,NONRNF,XLN/XL,20/CS: Brand: MEDLINE

## (undated) DEVICE — (D)HANDPIECE IRR W/HI FLO TIP -- DUPLICATE USE ITEM 121586

## (undated) DEVICE — GARMENT COMPR M FOR 13IN FT INTMIT SGL BLDR HEM FORC II

## (undated) DEVICE — BLADE SAW W12.5XL70MM THK1MM RECIP DBL SIDE OFFSET

## (undated) DEVICE — HANDPIECE SET WITH HIGH FLOW TIP AND SUCTION TUBE: Brand: INTERPULSE

## (undated) DEVICE — TUBING SUCT L10FT DIA0.25IN UNIV W/ SCALLOPED FEM CONN

## (undated) DEVICE — HYPODERMIC SAFETY NEEDLE: Brand: MAGELLAN

## (undated) DEVICE — SPONGE GZ W4XL4IN RAYON POLY 4 PLY NONWOVEN FASTER WICKING

## (undated) DEVICE — BLADE ELECTRODE: Brand: VALLEYLAB

## (undated) DEVICE — WRAP KNEE UNIV E STRP HK AND LOOP W/ GEL PK MEDCOOL

## (undated) DEVICE — BNDG,ELSTC,MATRIX,STRL,6"X5YD,LF,HOOK&LP: Brand: MEDLINE

## (undated) DEVICE — 3M™ TEGADERM™ HP TRANSPARENT FILM DRESSING FRAME STYLE, 9546HP, 4 IN X 4-1/2 IN (10 CM X 11.5 CM), 50/CT 4CT/CASE: Brand: 3M™ TEGADERM™

## (undated) DEVICE — CUFF REPROC TRNQT DPSB W/PLC BROWN 34IN

## (undated) DEVICE — ELECTRODE BLDE L6.5IN CAUT EXT DISP

## (undated) DEVICE — 450 ML BOTTLE OF 0.05% CHLORHEXIDINE GLUCONATE IN 99.95% STERILE WATER FOR IRRIGATION, USP AND APPLICATOR.: Brand: IRRISEPT ANTIMICROBIAL WOUND LAVAGE

## (undated) DEVICE — STOCKINETTE ORTHOPEDIC IMPERV 36X9 IN STRL

## (undated) DEVICE — 3M™ IOBAN™ 2 ANTIMICROBIAL INCISE DRAPE 6650EZ: Brand: IOBAN™ 2

## (undated) DEVICE — SPONGE LAP W18XL18IN WHT COT 4 PLY FLD STRUNG RADPQ DISP ST 2 PER PACK

## (undated) DEVICE — UNDERGLOVE SURG SZ 7.5 BLU LTX FREE SYN POLYISOPRENE

## (undated) DEVICE — SYSTEM SKIN CLSR 60CM 2-OCTYL CYNOACRLT W/ MESH DISPNS

## (undated) DEVICE — SPONGE GZ W4XL4IN 16 PLY DATA MSTR TAGGED DBL RADPQ

## (undated) DEVICE — SHEET, ORTHO, SPLIT, STERILE: Brand: MEDLINE

## (undated) DEVICE — BOWL MIXING VAC PALABOWL PALACOS

## (undated) DEVICE — SHEET,DRAPE,70X100,STERILE: Brand: MEDLINE

## (undated) DEVICE — GLOVE SURG 7 BIOGEL PI ULTRATOUCH G

## (undated) DEVICE — ATTUNE SOLO PINNING SYSTEM

## (undated) DEVICE — SYRINGE,EAR/ULCER, 2 OZ, STERILE: Brand: MEDLINE

## (undated) DEVICE — 3M™ COBAN™ SELF-ADHERENT WRAP, 1586S, STERILE, 6 IN X 5 YD (15 CM X 4,5 M), 12 ROLLS/CASE: Brand: 3M™ COBAN™

## (undated) DEVICE — SUTURE VCRL + SZ 2-0 L27IN ABSRB UD CT-2 L26MM 1/2 CIR TAPR VCP269H

## (undated) DEVICE — SUTURE VCRL + SZ 0 L27IN ANTIBACTERIAL POLYGLACTIN 910 W VCP280H

## (undated) DEVICE — DRAPE,TOP,102X53,STERILE: Brand: MEDLINE

## (undated) DEVICE — COUNTER NDL 40 COUNT HLD 70 FOAM BLK ADH W/ MAG

## (undated) DEVICE — GOWN,AURORA,FABRIC-REINFORCED,X-LARGE: Brand: MEDLINE

## (undated) DEVICE — TOWEL,OR,DSP,ST,BLUE,STD,4/PK,20PK/CS: Brand: MEDLINE

## (undated) DEVICE — INTENDED FOR TISSUE SEPARATION, AND OTHER PROCEDURES THAT REQUIRE A SHARP SURGICAL BLADE TO PUNCTURE OR CUT.: Brand: BARD-PARKER SAFETY BLADES SIZE 15, STERILE

## (undated) DEVICE — SOLUTION IRRIG 500ML 0.9% SOD CHLO USP POUR PLAS BTL

## (undated) DEVICE — GLOVE ORANGE PI 8 1/2   MSG9085

## (undated) DEVICE — INTENDED FOR TISSUE SEPARATION, AND OTHER PROCEDURES THAT REQUIRE A SHARP SURGICAL BLADE TO PUNCTURE OR CUT.: Brand: BARD-PARKER SAFETY BLADES SIZE 10, STERILE

## (undated) DEVICE — DRESSING ANTIMIC FOAM OPTIFOAM POSTOP ADH 4X14 IN

## (undated) DEVICE — APPLICATOR MEDICATED 26 CC SOLUTION HI LT ORNG CHLORAPREP

## (undated) DEVICE — GLOVE ORANGE PI 8   MSG9080

## (undated) DEVICE — 4-PORT MANIFOLD: Brand: NEPTUNE 2

## (undated) DEVICE — COVER,TABLE,HEAVY DUTY,77"X90",STRL: Brand: MEDLINE

## (undated) DEVICE — HOOD WITH PEEL AWAY FACE SHIELD: Brand: T7PLUS

## (undated) DEVICE — TOTAL KNEE PACK: Brand: MEDLINE INDUSTRIES, INC.

## (undated) DEVICE — ELECTRODE PT RET AD L9FT HI MOIST COND ADH HYDRGEL CORDED

## (undated) DEVICE — GLOVE SURG SZ 75 L12IN FNGR THK79MIL GRN LTX FREE

## (undated) DEVICE — SYSTEM LBL CORRECT MED 2 MED

## (undated) DEVICE — STRYKER PERFORMANCE SERIES SAGITTAL BLADE: Brand: STRYKER PERFORMANCE SERIES

## (undated) DEVICE — THE STERILE LIGHT HANDLE COVER IS USED WITH STERIS SURGICAL LIGHTING AND VISUALIZATION SYSTEMS.

## (undated) DEVICE — BANDAGE COBAN 4 IN COMPR W4INXL5YD FOAM COHESIVE QUIK STK SELF ADH SFT

## (undated) DEVICE — PACK,EXTREMITY III: Brand: MEDLINE

## (undated) DEVICE — 3M™ COBAN™ NL STERILE NON-LATEX SELF-ADHERENT WRAP, 2086S, 6 IN X 5 YD (15 CM X 4,5 M), 12 ROLLS/CASE: Brand: 3M™ COBAN™

## (undated) DEVICE — AGENT HEMSTAT 3GM OXIDIZED REGENERATED CELOS ABSRB FOR CONT (ORDER MULTIPLES OF 5EA)

## (undated) DEVICE — SUTURE ETHLN SZ 2-0 L18IN NONABSORBABLE BLK L26MM FS 3/8 664G

## (undated) DEVICE — DRESSING FOAM W4XL10IN AG SIL ADH ANTIMIC POSTOP OPTIFOAM

## (undated) DEVICE — SUTURE VCRL SZ 3-0 L27IN ABSRB UD L24MM PS-1 3/8 CIR PRIM J936H

## (undated) DEVICE — GOWN,PRECEPT, XLNG/XLRG ,STRL: Brand: MEDLINE

## (undated) DEVICE — GLOVE SURG UNDERGLOVE 7.5 PF BLU BIOGEL PI MIC LF

## (undated) DEVICE — SOL IRR NACL 0.9% 500ML POUR --

## (undated) DEVICE — SPONGE LAP W18XL18IN WHT COT 4 PLY FLD STRUNG RADPQ DISP ST

## (undated) DEVICE — GLOVE SURG SZ 65 L12IN FNGR THK79MIL GRN LTX FREE

## (undated) DEVICE — SUTURE VCRL + SZ 1 L27IN ANTIBACTERIAL POLYGLACTIN 910 W VCP281H

## (undated) DEVICE — PICO 7 10CM X 40CM: Brand: PICO™ 7

## (undated) DEVICE — GLOVE SURG SZ 65 THK91MIL LTX FREE SYN POLYISOPRENE

## (undated) DEVICE — SYRINGE 20ML LL S/C 50

## (undated) DEVICE — GAUZE,SPONGE,4"X4",16PLY,STRL,LF,10/TRAY: Brand: MEDLINE